# Patient Record
Sex: FEMALE | Race: WHITE | Employment: OTHER | URBAN - METROPOLITAN AREA
[De-identification: names, ages, dates, MRNs, and addresses within clinical notes are randomized per-mention and may not be internally consistent; named-entity substitution may affect disease eponyms.]

---

## 2018-10-20 ENCOUNTER — HOSPITAL ENCOUNTER (OUTPATIENT)
Age: 83
Discharge: HOME | End: 2018-10-20
Payer: MEDICARE

## 2018-10-20 LAB — VANCOMYCIN TROUGH: 17 UG/ML (ref 10–20)

## 2018-10-20 PROCEDURE — 36415 COLL VENOUS BLD VENIPUNCTURE: CPT

## 2018-10-20 PROCEDURE — 80202 ASSAY OF VANCOMYCIN: CPT

## 2018-10-23 ENCOUNTER — HOSPITAL ENCOUNTER (OUTPATIENT)
Age: 83
Setting detail: SPECIMEN
Discharge: HOME | End: 2018-10-23
Payer: MEDICARE

## 2018-10-23 LAB
ALBUMIN SERPL-MCNC: 4 G/DL (ref 3.4–5)
ANION GAP SERPL CALCULATED.3IONS-SCNC: 14 MMOL/L (ref 3–16)
BASOPHILS ABSOLUTE: 0.1 K/UL (ref 0–0.2)
BASOPHILS RELATIVE PERCENT: 1.3 %
BUN BLDV-MCNC: 10 MG/DL (ref 7–20)
CALCIUM SERPL-MCNC: 9.6 MG/DL (ref 8.3–10.6)
CHLORIDE BLD-SCNC: 94 MMOL/L (ref 99–110)
CO2: 27 MMOL/L (ref 21–32)
CREAT SERPL-MCNC: 0.9 MG/DL (ref 0.6–1.2)
EOSINOPHILS ABSOLUTE: 0.4 K/UL (ref 0–0.6)
EOSINOPHILS RELATIVE PERCENT: 7.1 %
GFR AFRICAN AMERICAN: >60
GFR NON-AFRICAN AMERICAN: 60
GLUCOSE BLD-MCNC: 152 MG/DL (ref 70–99)
HCT VFR BLD CALC: 40.3 % (ref 36–48)
HEMOGLOBIN: 13.2 G/DL (ref 12–16)
LYMPHOCYTES ABSOLUTE: 1.6 K/UL (ref 1–5.1)
LYMPHOCYTES RELATIVE PERCENT: 25.2 %
MCH RBC QN AUTO: 30.9 PG (ref 26–34)
MCHC RBC AUTO-ENTMCNC: 32.7 G/DL (ref 31–36)
MCV RBC AUTO: 94.4 FL (ref 80–100)
MONOCYTES ABSOLUTE: 0.4 K/UL (ref 0–1.3)
MONOCYTES RELATIVE PERCENT: 7 %
NEUTROPHILS ABSOLUTE: 3.7 K/UL (ref 1.7–7.7)
NEUTROPHILS RELATIVE PERCENT: 59.4 %
PDW BLD-RTO: 13.4 % (ref 12.4–15.4)
PHOSPHORUS: 3.8 MG/DL (ref 2.5–4.9)
PLATELET # BLD: 269 K/UL (ref 135–450)
PMV BLD AUTO: 9.8 FL (ref 5–10.5)
POTASSIUM SERPL-SCNC: 4.3 MMOL/L (ref 3.5–5.1)
RBC # BLD: 4.27 M/UL (ref 4–5.2)
SEDIMENTATION RATE, ERYTHROCYTE: 33 MM/HR (ref 0–30)
SODIUM BLD-SCNC: 135 MMOL/L (ref 136–145)
VANCOMYCIN TROUGH: 18.4 UG/ML (ref 10–20)
WBC # BLD: 6.2 K/UL (ref 4–11)

## 2018-10-23 PROCEDURE — 85652 RBC SED RATE AUTOMATED: CPT

## 2018-10-23 PROCEDURE — 36415 COLL VENOUS BLD VENIPUNCTURE: CPT

## 2018-10-23 PROCEDURE — 80069 RENAL FUNCTION PANEL: CPT

## 2018-10-23 PROCEDURE — 80202 ASSAY OF VANCOMYCIN: CPT

## 2018-10-23 PROCEDURE — 85025 COMPLETE CBC W/AUTO DIFF WBC: CPT

## 2018-10-23 PROCEDURE — 86140 C-REACTIVE PROTEIN: CPT

## 2018-10-24 LAB — C-REACTIVE PROTEIN: 2.2 MG/L (ref 0–5.1)

## 2018-10-29 ENCOUNTER — HOSPITAL ENCOUNTER (OUTPATIENT)
Age: 83
Setting detail: SPECIMEN
Discharge: HOME | End: 2018-10-29
Payer: MEDICARE

## 2018-10-29 LAB
ALBUMIN SERPL-MCNC: 3.9 G/DL (ref 3.4–5)
ANION GAP SERPL CALCULATED.3IONS-SCNC: 13 MMOL/L (ref 3–16)
BASOPHILS ABSOLUTE: 0.3 K/UL (ref 0–0.2)
BASOPHILS RELATIVE PERCENT: 4.7 %
BUN BLDV-MCNC: 11 MG/DL (ref 7–20)
C-REACTIVE PROTEIN: 1.9 MG/L (ref 0–5.1)
CALCIUM SERPL-MCNC: 9.5 MG/DL (ref 8.3–10.6)
CHLORIDE BLD-SCNC: 99 MMOL/L (ref 99–110)
CO2: 28 MMOL/L (ref 21–32)
CREAT SERPL-MCNC: 0.9 MG/DL (ref 0.6–1.2)
EOSINOPHILS ABSOLUTE: 0.6 K/UL (ref 0–0.6)
EOSINOPHILS RELATIVE PERCENT: 9.4 %
GFR AFRICAN AMERICAN: >60
GFR NON-AFRICAN AMERICAN: 60
GLUCOSE BLD-MCNC: 82 MG/DL (ref 70–99)
HCT VFR BLD CALC: 35.9 % (ref 36–48)
HEMOGLOBIN: 11.6 G/DL (ref 12–16)
LYMPHOCYTES ABSOLUTE: 2.3 K/UL (ref 1–5.1)
LYMPHOCYTES RELATIVE PERCENT: 33.7 %
MCH RBC QN AUTO: 30.4 PG (ref 26–34)
MCHC RBC AUTO-ENTMCNC: 32.4 G/DL (ref 31–36)
MCV RBC AUTO: 93.8 FL (ref 80–100)
MONOCYTES ABSOLUTE: 0.5 K/UL (ref 0–1.3)
MONOCYTES RELATIVE PERCENT: 7.7 %
NEUTROPHILS ABSOLUTE: 3 K/UL (ref 1.7–7.7)
NEUTROPHILS RELATIVE PERCENT: 44.5 %
PDW BLD-RTO: 13.8 % (ref 12.4–15.4)
PHOSPHORUS: 3.6 MG/DL (ref 2.5–4.9)
PLATELET # BLD: 259 K/UL (ref 135–450)
PMV BLD AUTO: 9.5 FL (ref 5–10.5)
POTASSIUM SERPL-SCNC: 4.3 MMOL/L (ref 3.5–5.1)
RBC # BLD: 3.83 M/UL (ref 4–5.2)
SEDIMENTATION RATE, ERYTHROCYTE: 39 MM/HR (ref 0–30)
SODIUM BLD-SCNC: 140 MMOL/L (ref 136–145)
VANCOMYCIN TROUGH: 17.3 UG/ML (ref 10–20)
WBC # BLD: 6.7 K/UL (ref 4–11)

## 2018-10-29 PROCEDURE — 80202 ASSAY OF VANCOMYCIN: CPT

## 2018-10-29 PROCEDURE — 86140 C-REACTIVE PROTEIN: CPT

## 2018-10-29 PROCEDURE — 85025 COMPLETE CBC W/AUTO DIFF WBC: CPT

## 2018-10-29 PROCEDURE — 85652 RBC SED RATE AUTOMATED: CPT

## 2018-10-29 PROCEDURE — 36415 COLL VENOUS BLD VENIPUNCTURE: CPT

## 2018-10-29 PROCEDURE — 80069 RENAL FUNCTION PANEL: CPT

## 2021-04-02 ENCOUNTER — OFFICE VISIT (OUTPATIENT)
Dept: ORTHOPEDIC SURGERY | Age: 86
End: 2021-04-02
Payer: MEDICARE

## 2021-04-02 DIAGNOSIS — M17.11 PRIMARY OSTEOARTHRITIS OF RIGHT KNEE: Primary | ICD-10-CM

## 2021-04-02 PROCEDURE — G8400 PT W/DXA NO RESULTS DOC: HCPCS | Performed by: ORTHOPAEDIC SURGERY

## 2021-04-02 PROCEDURE — G8427 DOCREV CUR MEDS BY ELIG CLIN: HCPCS | Performed by: ORTHOPAEDIC SURGERY

## 2021-04-02 PROCEDURE — 99203 OFFICE O/P NEW LOW 30 MIN: CPT | Performed by: ORTHOPAEDIC SURGERY

## 2021-04-02 PROCEDURE — G8421 BMI NOT CALCULATED: HCPCS | Performed by: ORTHOPAEDIC SURGERY

## 2021-04-02 PROCEDURE — 1090F PRES/ABSN URINE INCON ASSESS: CPT | Performed by: ORTHOPAEDIC SURGERY

## 2021-04-02 PROCEDURE — 4004F PT TOBACCO SCREEN RCVD TLK: CPT | Performed by: ORTHOPAEDIC SURGERY

## 2021-04-02 PROCEDURE — 4040F PNEUMOC VAC/ADMIN/RCVD: CPT | Performed by: ORTHOPAEDIC SURGERY

## 2021-04-02 PROCEDURE — 1123F ACP DISCUSS/DSCN MKR DOCD: CPT | Performed by: ORTHOPAEDIC SURGERY

## 2021-04-02 RX ORDER — DULOXETIN HYDROCHLORIDE 60 MG/1
60 CAPSULE, DELAYED RELEASE ORAL DAILY
COMMUNITY
Start: 2021-02-17

## 2021-04-02 RX ORDER — MELOXICAM 15 MG/1
15 TABLET ORAL DAILY
COMMUNITY
Start: 2021-01-22

## 2021-04-02 RX ORDER — METHIMAZOLE 5 MG/1
5 TABLET ORAL DAILY
COMMUNITY
Start: 2021-01-22

## 2021-04-02 NOTE — PROGRESS NOTES
KNEE VISIT      HISTORY OF PRESENT ILLNESS    Isiah Kocher is a 81 y.o. female who presents for evaluation of right knee pain she has had for the last several years. Over the years she has had medication therapy bracing and injections. None of these of lasted for more than just a few weeks. Because of this failure and her inability to get around and her desire to get better, she is here to discuss treatment options. ROS    Well-documented patient history form dated 4/2/2021  All other ROS negative except for above. Past Surgical history    Past Surgical History:   Procedure Laterality Date    BREAST SURGERY      CATARACT REMOVAL      CHOLECYSTECTOMY      DIAGNOSTIC CARDIAC CATH LAB PROCEDURE      HYSTERECTOMY      KNEE SURGERY      ROTATOR CUFF REPAIR      WRIST SURGERY         PAST MEDICAL    Past Medical History:   Diagnosis Date    Arthritis     Asthma     Hyperlipidemia     Hypertension     Rash     non-cancerous moles removed       Allergies    No Known Allergies    Meds    Current Outpatient Medications   Medication Sig Dispense Refill    vitamin D (CHOLECALCIFEROL) 25 MCG (1000 UT) TABS tablet Take 2,000 Units by mouth daily      DULoxetine (CYMBALTA) 60 MG extended release capsule       meloxicam (MOBIC) 15 MG tablet       methIMAzole (TAPAZOLE) 5 MG tablet       fluticasone-salmeterol (ADVAIR) 100-50 MCG/DOSE diskus inhaler Inhale 1 puff into the lungs every 12 hours.  gabapentin (NEURONTIN) 300 MG capsule Take 100 mg by mouth 3 times daily.  omeprazole (PRILOSEC) 20 MG capsule Take 20 mg by mouth daily.  metoprolol (TOPROL-XL) 50 MG XL tablet Take 50 mg by mouth daily.  hydrochlorothiazide (HYDRODIURIL) 25 MG tablet Take 25 mg by mouth daily.  pravastatin (PRAVACHOL) 20 MG tablet Take 20 mg by mouth daily.  aspirin 81 MG tablet Take 81 mg by mouth daily.       oxyCODONE-acetaminophen (PERCOCET) 5-325 MG per tablet Take 1 tablet by mouth 2 times daily as needed for Pain (Patient not taking: Reported on 4/2/2021) 60 tablet 0     No current facility-administered medications for this visit. Social    Social History     Socioeconomic History    Marital status:      Spouse name: Not on file    Number of children: Not on file    Years of education: Not on file    Highest education level: Not on file   Occupational History    Not on file   Social Needs    Financial resource strain: Not on file    Food insecurity     Worry: Not on file     Inability: Not on file    Transportation needs     Medical: Not on file     Non-medical: Not on file   Tobacco Use    Smoking status: Never Smoker   Substance and Sexual Activity    Alcohol use: Not on file    Drug use: Not on file    Sexual activity: Not on file   Lifestyle    Physical activity     Days per week: Not on file     Minutes per session: Not on file    Stress: Not on file   Relationships    Social connections     Talks on phone: Not on file     Gets together: Not on file     Attends [employer] service: Not on file     Active member of club or organization: Not on file     Attends meetings of clubs or organizations: Not on file     Relationship status: Not on file    Intimate partner violence     Fear of current or ex partner: Not on file     Emotionally abused: Not on file     Physically abused: Not on file     Forced sexual activity: Not on file   Other Topics Concern    Not on file   Social History Narrative    Not on file       Family HISTORY    Family History   Problem Relation Age of Onset    Diabetes Mother     Heart Failure Father     Cancer Sister         breast    Diabetes Brother     Emphysema Brother     Asthma Neg Hx     Hypertension Neg Hx     Cancer Sister         lung    Heart Failure Brother        PHYSICAL EXAM    Vital Signs: There were no vitals taken for this visit. General Appearance:  Normal body habitus.  Alert and oriented to person, place, and time.   Affect:  Normal.   Gait: Antalgic requiring a cane. Good balance and coordination. Skin:  Intact. Sensation:  Intact. Strength:  Intact. Reflexes:  Intact. Pulses:  Intact. Knee Exam:    Effusion: Negative    Range of Motion Right Left   Extension -8 0   Flexion 100 105     Provocative Test Right Left    Positive Negative Positive Negative   Anterior drawer [] [x] [] [x]   Lachman [] [x] [] [x]   Posterior drawer [] [x] [] [x]   Varus testing [] [x] [] [x]   Valgus testing [x] [] [x] []   Joint line tenderness [x] [] [x] []     Additional Exam Comments: Her neurocirculatory lymphatic exam is normal symmetric both lower extremities. She has none range of motion some crepitus stiffness and generalized pain mostly lateral compartment of her right knee. She has no gross instability. IMAGING STUDIES    X-rays 2 views right knee reveals severe end-stage osteoarthritis which is tricompartmental and affects the lateral compartment to the greatest degree    IMPRESSION    End-stage severe right knee osteoarthritis    PLAN      1. Conservative care options including physical therapy, NSAIDs, bracing, and activity modification were discussed. 2.  The indications for therapeutic injections were discussed. 3.  The indications for additional imaging studies were discussed. 4.  After considering the various options discussed, the patient elected to pursue a course that includes proceed with right total knee replacement since she is failed to improve over the last few years with other conservative modalities including medicines and injections. She has x-rays which demonstrate severe end-stage osteoarthritis    The patient was counseled at length about the risks of cecelia Covid-19 during their perioperative period and any recovery window from their procedure.   The patient was made aware that cecelia Covid-19  may worsen their prognosis for recovering from their procedure  and lend to a higher morbidity and/or mortality risk. All material risks, benefits, and reasonable alternatives including postponing the procedure were discussed. The patient does wish to proceed with the procedure at this time. INFORMED CONSENT NOTE        We discussed the risks, benefits, and alternatives to the proposed procedure, as well as the necessity of other members of the healthcare team participating in the procedure. All questions were answered and the patient elected to proceed with the proposed procedure and signed the informed consent form.

## 2021-04-02 NOTE — LETTER
[address] [place] [address]  Phone: [phone]  Fax: [phone]    Ashlyn Sherman MD        2021    [address] [address]   1935  DOS 2021      KNEE VISIT      HISTORY OF PRESENT ILLNESS    Asia Roberts is a 81 y.o. female who presents for evaluation of right knee pain she has had for the last several years. Over the years she has had medication therapy bracing and injections. None of these of lasted for more than just a few weeks. Because of this failure and her inability to get around and her desire to get better, she is here to discuss treatment options. ROS    Well-documented patient history form dated 2021  All other ROS negative except for above. Past Surgical history    Past Surgical History:   Procedure Laterality Date    BREAST SURGERY      CATARACT REMOVAL      CHOLECYSTECTOMY      DIAGNOSTIC CARDIAC CATH LAB PROCEDURE      HYSTERECTOMY      KNEE SURGERY      ROTATOR CUFF REPAIR      WRIST SURGERY         PAST MEDICAL    Past Medical History:   Diagnosis Date    Arthritis     Asthma     Hyperlipidemia     Hypertension     Rash     non-cancerous moles removed       Allergies    No Known Allergies    Meds    Current Outpatient Medications   Medication Sig Dispense Refill    vitamin D (CHOLECALCIFEROL) 25 MCG (1000 UT) TABS tablet Take 2,000 Units by mouth daily      DULoxetine (CYMBALTA) 60 MG extended release capsule       meloxicam (MOBIC) 15 MG tablet       methIMAzole (TAPAZOLE) 5 MG tablet       fluticasone-salmeterol (ADVAIR) 100-50 MCG/DOSE diskus inhaler Inhale 1 puff into the lungs every 12 hours.  gabapentin (NEURONTIN) 300 MG capsule Take 100 mg by mouth 3 times daily.  omeprazole (PRILOSEC) 20 MG capsule Take 20 mg by mouth daily.  metoprolol (TOPROL-XL) 50 MG XL tablet Take 50 mg by mouth daily.       hydrochlorothiazide (HYDRODIURIL) 25 MG tablet Take 25 mg by mouth daily.  pravastatin (PRAVACHOL) 20 MG tablet Take 20 mg by mouth daily.  aspirin 81 MG tablet Take 81 mg by mouth daily.  oxyCODONE-acetaminophen (PERCOCET) 5-325 MG per tablet Take 1 tablet by mouth 2 times daily as needed for Pain (Patient not taking: Reported on 4/2/2021) 60 tablet 0     No current facility-administered medications for this visit.         Social    Social History     Socioeconomic History    Marital status:      Spouse name: Not on file    Number of children: Not on file    Years of education: Not on file    Highest education level: Not on file   Occupational History    Not on file   Social Needs    Financial resource strain: Not on file    Food insecurity     Worry: Not on file     Inability: Not on file    Transportation needs     Medical: Not on file     Non-medical: Not on file   Tobacco Use    Smoking status: Never Smoker   Substance and Sexual Activity    Alcohol use: Not on file    Drug use: Not on file    Sexual activity: Not on file   Lifestyle    Physical activity     Days per week: Not on file     Minutes per session: Not on file    Stress: Not on file   Relationships    Social connections     Talks on phone: Not on file     Gets together: Not on file     Attends Latter-day service: Not on file     Active member of club or organization: Not on file     Attends meetings of clubs or organizations: Not on file     Relationship status: Not on file    Intimate partner violence     Fear of current or ex partner: Not on file     Emotionally abused: Not on file     Physically abused: Not on file     Forced sexual activity: Not on file   Other Topics Concern    Not on file   Social History Narrative    Not on file       Family HISTORY    Family History   Problem Relation Age of Onset    Diabetes Mother     Heart Failure Father     Cancer Sister         breast    Diabetes Brother    [facility] Emphysema Brother     Asthma Neg Hx     Hypertension Neg Hx     Cancer Sister         lung    Heart Failure Brother        PHYSICAL EXAM    Vital Signs: There were no vitals taken for this visit. General Appearance:  Normal body habitus. Alert and oriented to person, place, and time. Affect:  Normal.   Gait: Antalgic requiring a cane. Good balance and coordination. Skin:  Intact. Sensation:  Intact. Strength:  Intact. Reflexes:  Intact. Pulses:  Intact. Knee Exam:    Effusion: Negative    Range of Motion Right Left   Extension -8 0   Flexion 100 105     Provocative Test Right Left    Positive Negative Positive Negative   Anterior drawer [] [x] [] [x]   Lachman [] [x] [] [x]   Posterior drawer [] [x] [] [x]   Varus testing [] [x] [] [x]   Valgus testing [x] [] [x] []   Joint line tenderness [x] [] [x] []     Additional Exam Comments: Her neurocirculatory lymphatic exam is normal symmetric both lower extremities. She has none range of motion some crepitus stiffness and generalized pain mostly lateral compartment of her right knee. She has no gross instability. IMAGING STUDIES    X-rays 2 views right knee reveals severe end-stage osteoarthritis which is tricompartmental and affects the lateral compartment to the greatest degree    IMPRESSION    End-stage severe right knee osteoarthritis    PLAN      1. Conservative care options including physical therapy, NSAIDs, bracing, and activity modification were discussed. 2.  The indications for therapeutic injections were discussed. 3.  The indications for additional imaging studies were discussed. 4.  After considering the various options discussed, the patient elected to pursue a course that includes proceed with right total knee replacement since she is failed to improve over the last few years with other conservative modalities including medicines and injections.   She has x-rays which demonstrate severe end-stage osteoarthritis    The

## 2021-04-13 ENCOUNTER — TELEPHONE (OUTPATIENT)
Dept: ORTHOPEDIC SURGERY | Age: 86
End: 2021-04-13

## 2021-04-13 NOTE — TELEPHONE ENCOUNTER
Auth: NPR  Date: 5/17/2021  Reference # None  Spoke with: None  [place]: [place]  [place] [facility] [place] [address]   SX area: Rt knee  Insurance: Medicare A&B

## 2021-04-26 ENCOUNTER — HOSPITAL ENCOUNTER (OUTPATIENT)
Age: 86
Discharge: HOME | End: 2021-04-26
Payer: MEDICARE

## 2021-04-26 ENCOUNTER — HOSPITAL ENCOUNTER (OUTPATIENT)
Dept: GENERAL RADIOLOGY | Age: 86
Discharge: HOME | End: 2021-04-26
Payer: MEDICARE

## 2021-04-26 DIAGNOSIS — M17.0 OSTEOARTHRITIS OF BOTH KNEES, UNSPECIFIED OSTEOARTHRITIS TYPE: ICD-10-CM

## 2021-04-26 LAB
ALBUMIN SERPL-MCNC: 4.3 G/DL (ref 3.4–5)
ANION GAP SERPL CALCULATED.3IONS-SCNC: 14 MMOL/L (ref 3–16)
APTT: 32.6 SEC (ref 24.2–36.2)
BASOPHILS ABSOLUTE: 0.1 K/UL (ref 0–0.2)
BASOPHILS RELATIVE PERCENT: 0.8 %
BILIRUBIN URINE: NEGATIVE
BLOOD, URINE: NEGATIVE
BUN BLDV-MCNC: 26 MG/DL (ref 7–20)
CALCIUM SERPL-MCNC: 9.2 MG/DL (ref 8.3–10.6)
CHLORIDE BLD-SCNC: 102 MMOL/L (ref 99–110)
CLARITY: CLEAR
CO2: 26 MMOL/L (ref 21–32)
COLOR: YELLOW
CREAT SERPL-MCNC: 1.2 MG/DL (ref 0.6–1.2)
EKG ATRIAL RATE: 60 BPM
EKG DIAGNOSIS: NORMAL
EKG P AXIS: 56 DEGREES
EKG P-R INTERVAL: 158 MS
EKG Q-T INTERVAL: 412 MS
EKG QRS DURATION: 86 MS
EKG QTC CALCULATION (BAZETT): 412 MS
EKG R AXIS: 32 DEGREES
EKG T AXIS: 51 DEGREES
EKG VENTRICULAR RATE: 60 BPM
EOSINOPHILS ABSOLUTE: 0.4 K/UL (ref 0–0.6)
EOSINOPHILS RELATIVE PERCENT: 4.5 %
GFR AFRICAN AMERICAN: 52
GFR NON-AFRICAN AMERICAN: 43
GLUCOSE BLD-MCNC: 99 MG/DL (ref 70–99)
GLUCOSE URINE: NEGATIVE MG/DL
HCT VFR BLD CALC: 40.1 % (ref 36–48)
HEMOGLOBIN: 13.3 G/DL (ref 12–16)
INR BLD: 0.97 (ref 0.86–1.14)
KETONES, URINE: ABNORMAL MG/DL
LEUKOCYTE ESTERASE, URINE: NEGATIVE
LYMPHOCYTES ABSOLUTE: 2.4 K/UL (ref 1–5.1)
LYMPHOCYTES RELATIVE PERCENT: 26.4 %
MCH RBC QN AUTO: 30 PG (ref 26–34)
MCHC RBC AUTO-ENTMCNC: 33.2 G/DL (ref 31–36)
MCV RBC AUTO: 90.4 FL (ref 80–100)
MICROSCOPIC EXAMINATION: ABNORMAL
MONOCYTES ABSOLUTE: 0.6 K/UL (ref 0–1.3)
MONOCYTES RELATIVE PERCENT: 6.8 %
NEUTROPHILS ABSOLUTE: 5.6 K/UL (ref 1.7–7.7)
NEUTROPHILS RELATIVE PERCENT: 61.5 %
NITRITE, URINE: NEGATIVE
PDW BLD-RTO: 13.8 % (ref 12.4–15.4)
PH UA: 6.5 (ref 5–8)
PLATELET # BLD: 238 K/UL (ref 135–450)
PMV BLD AUTO: 10 FL (ref 5–10.5)
POTASSIUM SERPL-SCNC: 4.8 MMOL/L (ref 3.5–5.1)
PROTEIN UA: NEGATIVE MG/DL
PROTHROMBIN TIME: 11.3 SEC (ref 10–13.2)
RBC # BLD: 4.43 M/UL (ref 4–5.2)
SODIUM BLD-SCNC: 142 MMOL/L (ref 136–145)
SPECIFIC GRAVITY UA: 1.02 (ref 1–1.03)
TRANSFERRIN: 281 MG/DL (ref 200–360)
URINE TYPE: ABNORMAL
UROBILINOGEN, URINE: 1 E.U./DL
WBC # BLD: 9.1 K/UL (ref 4–11)

## 2021-04-26 PROCEDURE — 80048 BASIC METABOLIC PNL TOTAL CA: CPT

## 2021-04-26 PROCEDURE — 85730 THROMBOPLASTIN TIME PARTIAL: CPT

## 2021-04-26 PROCEDURE — 85610 PROTHROMBIN TIME: CPT

## 2021-04-26 PROCEDURE — 93005 ELECTROCARDIOGRAM TRACING: CPT | Performed by: ORTHOPAEDIC SURGERY

## 2021-04-26 PROCEDURE — 84466 ASSAY OF TRANSFERRIN: CPT

## 2021-04-26 PROCEDURE — 36415 COLL VENOUS BLD VENIPUNCTURE: CPT

## 2021-04-26 PROCEDURE — 87086 URINE CULTURE/COLONY COUNT: CPT

## 2021-04-26 PROCEDURE — 93010 ELECTROCARDIOGRAM REPORT: CPT | Performed by: INTERNAL MEDICINE

## 2021-04-26 PROCEDURE — 82040 ASSAY OF SERUM ALBUMIN: CPT

## 2021-04-26 PROCEDURE — 81003 URINALYSIS AUTO W/O SCOPE: CPT

## 2021-04-26 PROCEDURE — 85025 COMPLETE CBC W/AUTO DIFF WBC: CPT

## 2021-04-27 LAB — URINE CULTURE, ROUTINE: NORMAL

## 2021-04-30 DIAGNOSIS — M17.11 PRIMARY OSTEOARTHRITIS OF RIGHT KNEE: Primary | ICD-10-CM

## 2021-04-30 RX ORDER — VITAMIN E 268 MG
400 CAPSULE ORAL DAILY
COMMUNITY

## 2021-04-30 SDOH — HEALTH STABILITY: MENTAL HEALTH: HOW OFTEN DO YOU HAVE A DRINK CONTAINING ALCOHOL?: NEVER

## 2021-04-30 NOTE — PROGRESS NOTES
PRE OP INSTRUCTION SHEET   1. Do not eat or drink anything after 12 midnight  prior to surgery. This includes no water, chewing gum or mints. 2. Take the following pills will a small sip of water (see MAR)                                        3. Aspirin, Ibuprofen, Advil, Naproxen, Vitamin E, fish oil and other Anti-inflammatory products should be stopped for 5 days before surgery or as directed by your physician. 4. Check with your Doctor regarding stopping Plavix, Coumadin, Lovenox, Fragmin or other blood thinners   5. Do not smoke, and do not drink any alcoholic beverages 24 hours prior to surgery. This includes NA Beer. 6. You may brush your teeth and gargle the morning of surgery. DO NOT SWALLOW WATER   7. You MUST make arrangements for a responsible adult to take you home after your surgery. You will not be allowed to leave alone or drive yourself home. It is strongly suggested someone stay with you the first 24 hrs. Your surgery will be cancelled if you do not have a ride home. 8. A parent/legal guardian must accompany a child scheduled for surgery and plan to stay at the hospital until the child is discharged. Please do not bring other children with you. 9. Please wear simple, loose fitting clothing to the hospital.  Roberto Edu not bring valuables (money, credit cards, checkbooks, etc.) Do not wear any makeup (including no eye makeup) or nail polish on your fingers or toes. 10. DO NOT wear any jewelry or piercings on day of surgery. All body piercing jewelry must be removed. 11. If you have dentures,glasses, or contacts they will be removed before going to the OR; we will provide you a container. 12. Please see your family doctor/and cardiologist for a history & physical and/or concerning medications. Bring any test results/reports from your physician's office. Have history and labs faxed to [phone].  Remember to bring Blood Bank bracelet on the day of surgery. 14. If you have a Living Will and Durable Power of  for Healthcare, please bring in a copy. 13. Notify your Surgeon if you develop any illness between now and surgery  time, cough, cold, fever, sore throat, nausea, vomiting, etc.  Please notify your surgeon if you experience dizziness, shortness of breath or blurred vision between now & the time of your surgery   16. DO NOT shave your operative site 96 hours prior to surgery. For face & neck surgery, men may use an electric razor 48 hours prior to surgery. 17. Shower with _x__Antibacterial soap (x_chlorhexidine for total joint  Pt's) shower two times before surgery.(the morning of and the night before. 18. To provide excellent care visitors will be limited to one in the room at any given time.   Please call pre admission testing if you any further questions [phone] or [phone]

## 2021-05-07 ENCOUNTER — TELEPHONE (OUTPATIENT)
Dept: ORTHOPEDIC SURGERY | Age: 86
End: 2021-05-07

## 2021-05-07 NOTE — TELEPHONE ENCOUNTER
COVID:5/11/21-Negative  H and P- Not listed in epic  Cardio clearance- not listed in Epic. Orthopedic Nurse Navigator Summary  -  Patient Name: Willie Ferreira  Anticipated Date of Surgery:5/17/21  Using [facility]? Yes, Are they Registered: Yes  Attended Pre-Op Education Class: No  If No, why not? PCP:  Phone #:  Date of PCP Visit for H&P: 04/26/2021  Any Noted Concerns from PCP prior to surgery: No  If Yes, what concerns?:  Is the Patient in a Pain Management Program?: No  Review of Past Medical History Reveals History of:  -  Critical Lab Values:  - Hemoglobin (g/dL): Date Value 13.3  - Hematocrit (%): Date Value  - HgbA1C : Date Value 0.0  - Albumin : Date Value 4.3  - BUN (mg/dL) : Date Value 26.0  - CREATININE (mg/dL) : Date Value 1.2  - BMI (kg/m2) : Date Value  -  Coronary Artery Disease/HTN/CHF History: Yes  Cardiologist: HTN managed by PCP and meds  Cardiac Clearance Necessary: Yes  Date of Cardiac Clearance Appt: 05/03/2021  On Plavix? No  If YES, when will they stop taking? Final Cardiac Recommendations: On any anticoagulation: No  -  Diabetes History: No  Most Recent HgbA1C: 0.0  PCP or Endocrine Recommendations:  Nutritionist/Dietician Consult Scheduled:  Final Plan For Diabetic Control:  Pulmonary: COPD/Emphysema/ Use of home oxygen: [place]- Does not use C pap  Alcohol use: Non-Drinker  -  DVT Risk Stratification: Low Risk  Vascular Consult Ordered:  Date of Vascular Appt:  Hematology/Oncology Consult Ordered:  Date of Hematology/Oncology Appt:  Final Recommendation For DVT Prophylaxis:  Smoking history: Non-Smoker  Use of Estrogen:  -  BMI Greater than 40 at time of scheduling?: No  Has Surgeon been notified of BMI concern? No  Weight Loss Clinic Consult Ordered No  Date of Wt Loss Clinic Appt:  BMI at time of surgery (if went through [facility]):  -  Additional Medical Concerns:   Additional Recommendations for above concerns:  Attended Pre-Hab Program: No  Anticipated Discharge Disposition: D/C home  Who will be with patient at home following discharge?    Equipment patient already has: walker and cane  Bedroom on first or second floor: First  Bathroom on first or second floor: first  Weight bearing status: full  Pre-op ambulatory status:  Number of entry steps: 2 steps   Caregiver assistance: full time  -No HHC preference   Anna Rachael  05/10/2021

## 2021-05-11 ENCOUNTER — HOSPITAL ENCOUNTER (OUTPATIENT)
Age: 86
Discharge: HOME | End: 2021-05-11
Payer: MEDICARE

## 2021-05-11 LAB — SARS-COV-2: NOT DETECTED

## 2021-05-11 PROCEDURE — U0005 INFEC AGEN DETEC AMPLI PROBE: HCPCS

## 2021-05-11 PROCEDURE — U0003 INFECTIOUS AGENT DETECTION BY NUCLEIC ACID (DNA OR RNA); SEVERE ACUTE RESPIRATORY SYNDROME CORONAVIRUS 2 (SARS-COV-2) (CORONAVIRUS DISEASE [COVID-19]), AMPLIFIED PROBE TECHNIQUE, MAKING USE OF HIGH THROUGHPUT TECHNOLOGIES AS DESCRIBED BY CMS-2020-01-R: HCPCS

## 2021-05-15 ENCOUNTER — ANESTHESIA EVENT (OUTPATIENT)
Dept: OPERATING ROOM | Age: 86
DRG: 470 | End: 2021-05-15
Payer: MEDICARE

## 2021-05-16 NOTE — ANESTHESIA PRE PROCEDURE
 BREAST SURGERY      CATARACT REMOVAL      CHOLECYSTECTOMY      DIAGNOSTIC CARDIAC CATH LAB PROCEDURE      DILATION AND CURETTAGE OF UTERUS      EYE SURGERY      HYSTERECTOMY      KNEE SURGERY      ROTATOR CUFF REPAIR      WRIST SURGERY         Social History:    Social History     Tobacco Use    Smoking status: Never Smoker    Smokeless tobacco: Never Used   Substance Use Topics    Alcohol use: Never                                Counseling given: Not Answered      Vital Signs (Current):   Vitals:    04/30/21 1042   Weight: 173 lb (78.5 kg)   Height: 5' 2\" (1.575 m)                                              BP Readings from Last 3 Encounters:   05/07/15 118/74   04/07/15 138/84   11/22/13 138/88       NPO Status:                                                                                 BMI:   Wt Readings from Last 3 Encounters:   05/07/15 197 lb (89.4 kg)   05/05/15 197 lb (89.4 kg)   04/07/15 204 lb (92.5 kg)     Body mass index is 31.64 kg/m². CBC:   Lab Results   Component Value Date    WBC 9.1 04/26/2021    RBC 4.43 04/26/2021    HGB 13.3 04/26/2021    HCT 40.1 04/26/2021    MCV 90.4 04/26/2021    RDW 13.8 04/26/2021     04/26/2021       CMP:   Lab Results   Component Value Date     04/26/2021    K 4.8 04/26/2021     04/26/2021    CO2 26 04/26/2021    BUN 26 04/26/2021    CREATININE 1.2 04/26/2021    GFRAA 52 04/26/2021    LABGLOM 43 04/26/2021    GLUCOSE 99 04/26/2021    CALCIUM 9.2 04/26/2021       POC Tests: No results for input(s): POCGLU, POCNA, POCK, POCCL, POCBUN, POCHEMO, POCHCT in the last 72 hours.     Coags:   Lab Results   Component Value Date    PROTIME 11.3 04/26/2021    INR 0.97 04/26/2021    APTT 32.6 04/26/2021       HCG (If Applicable): No results found for: PREGTESTUR, PREGSERUM, HCG, HCGQUANT     ABGs: No results found for: PHART, PO2ART, NPJ8FHH, ILT3HGF, BEART, I3YGLOKD     Type & Screen (If Applicable):  No results found for: [place] Drug/Infectious Status (If Applicable):  No results found for: HIV, HEPCAB    COVID-19 Screening (If Applicable):   Lab Results   Component Value Date    COVID19 Not Detected 05/11/2021           Anesthesia Evaluation  Patient summary reviewed and Nursing notes reviewed no history of anesthetic complications:   Airway: Mallampati: II  TM distance: >3 FB   Neck ROM: full  Mouth opening: > = 3 FB Dental:    (+) upper dentures      Pulmonary:   (+) sleep apnea:  asthma: allergic asthma,                            Cardiovascular:    (+) hypertension:,                   Neuro/Psych:   Negative Neuro/Psych ROS              GI/Hepatic/Renal: Neg GI/Hepatic/Renal ROS  (+) GERD: well controlled,      (-) liver disease and no renal disease       Endo/Other: Negative Endo/Other ROS       (-) diabetes mellitus               Abdominal:           Vascular: negative vascular ROS. Anesthesia Plan      spinal and regional     ASA 3     (Adductor canal block for post op pain.)  Induction: intravenous. Anesthetic plan and risks discussed with patient. Plan discussed with CRNA. All questions answered and agrees with plan.         Villa Kirk MD   5/15/2021

## 2021-05-17 ENCOUNTER — HOSPITAL ENCOUNTER (INPATIENT)
Age: 86
LOS: 3 days | Discharge: HOME | DRG: 470 | End: 2021-05-20
Attending: ORTHOPAEDIC SURGERY | Admitting: ORTHOPAEDIC SURGERY
Payer: MEDICARE

## 2021-05-17 ENCOUNTER — APPOINTMENT (OUTPATIENT)
Dept: GENERAL RADIOLOGY | Age: 86
DRG: 470 | End: 2021-05-17
Attending: ORTHOPAEDIC SURGERY
Payer: MEDICARE

## 2021-05-17 ENCOUNTER — ANESTHESIA (OUTPATIENT)
Dept: OPERATING ROOM | Age: 86
DRG: 470 | End: 2021-05-17
Payer: MEDICARE

## 2021-05-17 VITALS — OXYGEN SATURATION: 93 % | TEMPERATURE: 97.9 F | SYSTOLIC BLOOD PRESSURE: 156 MMHG | DIASTOLIC BLOOD PRESSURE: 69 MMHG

## 2021-05-17 PROBLEM — M17.11 OSTEOARTHRITIS OF RIGHT KNEE: Status: ACTIVE | Noted: 2021-05-17

## 2021-05-17 LAB
ABO/RH: NORMAL
ANTIBODY SCREEN: NORMAL

## 2021-05-17 PROCEDURE — 97530 THERAPEUTIC ACTIVITIES: CPT

## 2021-05-17 PROCEDURE — 6360000002 HC RX W HCPCS: Performed by: ORTHOPAEDIC SURGERY

## 2021-05-17 PROCEDURE — 2580000003 HC RX 258: Performed by: NURSE ANESTHETIST, CERTIFIED REGISTERED

## 2021-05-17 PROCEDURE — 3E0T3BZ INTRODUCTION OF ANESTHETIC AGENT INTO PERIPHERAL NERVES AND PLEXI, PERCUTANEOUS APPROACH: ICD-10-PCS | Performed by: ANESTHESIOLOGY

## 2021-05-17 PROCEDURE — 2580000003 HC RX 258: Performed by: ORTHOPAEDIC SURGERY

## 2021-05-17 PROCEDURE — 73560 X-RAY EXAM OF KNEE 1 OR 2: CPT

## 2021-05-17 PROCEDURE — 2500000003 HC RX 250 WO HCPCS: Performed by: NURSE ANESTHETIST, CERTIFIED REGISTERED

## 2021-05-17 PROCEDURE — 2500000003 HC RX 250 WO HCPCS: Performed by: ANESTHESIOLOGY

## 2021-05-17 PROCEDURE — 7100000000 HC PACU RECOVERY - FIRST 15 MIN: Performed by: ORTHOPAEDIC SURGERY

## 2021-05-17 PROCEDURE — L1830 KO IMMOB CANVAS LONG PRE OTS: HCPCS | Performed by: ORTHOPAEDIC SURGERY

## 2021-05-17 PROCEDURE — 6370000000 HC RX 637 (ALT 250 FOR IP): Performed by: ORTHOPAEDIC SURGERY

## 2021-05-17 PROCEDURE — 2500000003 HC RX 250 WO HCPCS: Performed by: ORTHOPAEDIC SURGERY

## 2021-05-17 PROCEDURE — 2580000003 HC RX 258: Performed by: ANESTHESIOLOGY

## 2021-05-17 PROCEDURE — 7100000001 HC PACU RECOVERY - ADDTL 15 MIN: Performed by: ORTHOPAEDIC SURGERY

## 2021-05-17 PROCEDURE — 3600000005 HC SURGERY LEVEL 5 BASE: Performed by: ORTHOPAEDIC SURGERY

## 2021-05-17 PROCEDURE — 97161 PT EVAL LOW COMPLEX 20 MIN: CPT

## 2021-05-17 PROCEDURE — 97535 SELF CARE MNGMENT TRAINING: CPT

## 2021-05-17 PROCEDURE — 86900 BLOOD TYPING SEROLOGIC ABO: CPT

## 2021-05-17 PROCEDURE — 1200000000 HC SEMI PRIVATE

## 2021-05-17 PROCEDURE — 64447 NJX AA&/STRD FEMORAL NRV IMG: CPT | Performed by: ANESTHESIOLOGY

## 2021-05-17 PROCEDURE — 86901 BLOOD TYPING SEROLOGIC RH(D): CPT

## 2021-05-17 PROCEDURE — 3700000001 HC ADD 15 MINUTES (ANESTHESIA): Performed by: ORTHOPAEDIC SURGERY

## 2021-05-17 PROCEDURE — 36415 COLL VENOUS BLD VENIPUNCTURE: CPT

## 2021-05-17 PROCEDURE — 3600000015 HC SURGERY LEVEL 5 ADDTL 15MIN: Performed by: ORTHOPAEDIC SURGERY

## 2021-05-17 PROCEDURE — 97110 THERAPEUTIC EXERCISES: CPT

## 2021-05-17 PROCEDURE — 97166 OT EVAL MOD COMPLEX 45 MIN: CPT

## 2021-05-17 PROCEDURE — 0SRC0J9 REPLACEMENT OF RIGHT KNEE JOINT WITH SYNTHETIC SUBSTITUTE, CEMENTED, OPEN APPROACH: ICD-10-PCS | Performed by: ORTHOPAEDIC SURGERY

## 2021-05-17 PROCEDURE — 86850 RBC ANTIBODY SCREEN: CPT

## 2021-05-17 PROCEDURE — C1776 JOINT DEVICE (IMPLANTABLE): HCPCS | Performed by: ORTHOPAEDIC SURGERY

## 2021-05-17 PROCEDURE — C1713 ANCHOR/SCREW BN/BN,TIS/BN: HCPCS | Performed by: ORTHOPAEDIC SURGERY

## 2021-05-17 PROCEDURE — 3700000000 HC ANESTHESIA ATTENDED CARE: Performed by: ORTHOPAEDIC SURGERY

## 2021-05-17 PROCEDURE — 2709999900 HC NON-CHARGEABLE SUPPLY: Performed by: ORTHOPAEDIC SURGERY

## 2021-05-17 PROCEDURE — C9290 INJ, BUPIVACAINE LIPOSOME: HCPCS | Performed by: ORTHOPAEDIC SURGERY

## 2021-05-17 PROCEDURE — 6360000002 HC RX W HCPCS: Performed by: NURSE ANESTHETIST, CERTIFIED REGISTERED

## 2021-05-17 DEVICE — CEMENT BNE 40GM W/ GENT HI VISC RADPQ FOR REV SURG: Type: IMPLANTABLE DEVICE | Site: KNEE | Status: FUNCTIONAL

## 2021-05-17 DEVICE — 32MM PATELLA, VITAMIN E
Type: IMPLANTABLE DEVICE | Site: KNEE | Status: FUNCTIONAL
Brand: BKS E-VITALIZE

## 2021-05-17 DEVICE — SIZE 4 7MM TIBIAL INSERT CR
Type: IMPLANTABLE DEVICE | Site: KNEE | Status: FUNCTIONAL
Brand: BKS E-VITALIZE

## 2021-05-17 DEVICE — SIZE 4 TIBIAL TRAY NONPOROUS
Type: IMPLANTABLE DEVICE | Site: KNEE | Status: FUNCTIONAL
Brand: BALANCED KNEE SYSTEM

## 2021-05-17 DEVICE — RT SIZE 5 FEMORAL CR NONPOROUS
Type: IMPLANTABLE DEVICE | Site: KNEE | Status: FUNCTIONAL
Brand: BKS TRIMAX

## 2021-05-17 RX ORDER — OXYCODONE HYDROCHLORIDE 5 MG/1
10 TABLET ORAL EVERY 4 HOURS PRN
Status: DISCONTINUED | OUTPATIENT
Start: 2021-05-17 | End: 2021-05-20 | Stop reason: HOSPADM

## 2021-05-17 RX ORDER — ACETAMINOPHEN 325 MG/1
650 TABLET ORAL EVERY 6 HOURS
Status: DISCONTINUED | OUTPATIENT
Start: 2021-05-17 | End: 2021-05-20 | Stop reason: HOSPADM

## 2021-05-17 RX ORDER — OXYCODONE HYDROCHLORIDE 5 MG/1
10 TABLET ORAL PRN
Status: DISCONTINUED | OUTPATIENT
Start: 2021-05-17 | End: 2021-05-17 | Stop reason: HOSPADM

## 2021-05-17 RX ORDER — ONDANSETRON 2 MG/ML
INJECTION INTRAMUSCULAR; INTRAVENOUS PRN
Status: DISCONTINUED | OUTPATIENT
Start: 2021-05-17 | End: 2021-05-17 | Stop reason: SDUPTHER

## 2021-05-17 RX ORDER — FENTANYL CITRATE 50 UG/ML
25 INJECTION, SOLUTION INTRAMUSCULAR; INTRAVENOUS EVERY 5 MIN PRN
Status: DISCONTINUED | OUTPATIENT
Start: 2021-05-17 | End: 2021-05-17 | Stop reason: HOSPADM

## 2021-05-17 RX ORDER — ONDANSETRON 2 MG/ML
4 INJECTION INTRAMUSCULAR; INTRAVENOUS EVERY 6 HOURS PRN
Status: DISCONTINUED | OUTPATIENT
Start: 2021-05-17 | End: 2021-05-20 | Stop reason: HOSPADM

## 2021-05-17 RX ORDER — FENTANYL CITRATE 50 UG/ML
INJECTION, SOLUTION INTRAMUSCULAR; INTRAVENOUS PRN
Status: DISCONTINUED | OUTPATIENT
Start: 2021-05-17 | End: 2021-05-17 | Stop reason: SDUPTHER

## 2021-05-17 RX ORDER — FENTANYL CITRATE 50 UG/ML
50 INJECTION, SOLUTION INTRAMUSCULAR; INTRAVENOUS EVERY 5 MIN PRN
Status: DISCONTINUED | OUTPATIENT
Start: 2021-05-17 | End: 2021-05-17 | Stop reason: HOSPADM

## 2021-05-17 RX ORDER — PROPOFOL 10 MG/ML
INJECTION, EMULSION INTRAVENOUS PRN
Status: DISCONTINUED | OUTPATIENT
Start: 2021-05-17 | End: 2021-05-17 | Stop reason: SDUPTHER

## 2021-05-17 RX ORDER — OXYCODONE HYDROCHLORIDE 5 MG/1
5 TABLET ORAL EVERY 4 HOURS PRN
Status: DISCONTINUED | OUTPATIENT
Start: 2021-05-17 | End: 2021-05-20 | Stop reason: HOSPADM

## 2021-05-17 RX ORDER — PROMETHAZINE HYDROCHLORIDE 25 MG/ML
6.25 INJECTION, SOLUTION INTRAMUSCULAR; INTRAVENOUS
Status: DISCONTINUED | OUTPATIENT
Start: 2021-05-17 | End: 2021-05-17 | Stop reason: HOSPADM

## 2021-05-17 RX ORDER — LIDOCAINE HYDROCHLORIDE 10 MG/ML
0.3 INJECTION, SOLUTION EPIDURAL; INFILTRATION; INTRACAUDAL; PERINEURAL
Status: COMPLETED | OUTPATIENT
Start: 2021-05-17 | End: 2021-05-17

## 2021-05-17 RX ORDER — LIDOCAINE HYDROCHLORIDE 20 MG/ML
INJECTION, SOLUTION EPIDURAL; INFILTRATION; INTRACAUDAL; PERINEURAL PRN
Status: DISCONTINUED | OUTPATIENT
Start: 2021-05-17 | End: 2021-05-17 | Stop reason: SDUPTHER

## 2021-05-17 RX ORDER — SODIUM CHLORIDE 0.9 % (FLUSH) 0.9 %
5-40 SYRINGE (ML) INJECTION PRN
Status: DISCONTINUED | OUTPATIENT
Start: 2021-05-17 | End: 2021-05-20 | Stop reason: HOSPADM

## 2021-05-17 RX ORDER — SODIUM CHLORIDE 9 MG/ML
25 INJECTION, SOLUTION INTRAVENOUS PRN
Status: DISCONTINUED | OUTPATIENT
Start: 2021-05-17 | End: 2021-05-17 | Stop reason: HOSPADM

## 2021-05-17 RX ORDER — SODIUM CHLORIDE 9 MG/ML
25 INJECTION, SOLUTION INTRAVENOUS PRN
Status: DISCONTINUED | OUTPATIENT
Start: 2021-05-17 | End: 2021-05-20 | Stop reason: HOSPADM

## 2021-05-17 RX ORDER — SODIUM CHLORIDE 0.9 % (FLUSH) 0.9 %
5-40 SYRINGE (ML) INJECTION EVERY 12 HOURS SCHEDULED
Status: DISCONTINUED | OUTPATIENT
Start: 2021-05-17 | End: 2021-05-17 | Stop reason: HOSPADM

## 2021-05-17 RX ORDER — SODIUM CHLORIDE 0.9 % (FLUSH) 0.9 %
5-40 SYRINGE (ML) INJECTION PRN
Status: DISCONTINUED | OUTPATIENT
Start: 2021-05-17 | End: 2021-05-17 | Stop reason: HOSPADM

## 2021-05-17 RX ORDER — ONDANSETRON 2 MG/ML
4 INJECTION INTRAMUSCULAR; INTRAVENOUS
Status: DISCONTINUED | OUTPATIENT
Start: 2021-05-17 | End: 2021-05-17 | Stop reason: HOSPADM

## 2021-05-17 RX ORDER — TRANEXAMIC ACID 100 MG/ML
1000 INJECTION, SOLUTION INTRAVENOUS 2 TIMES DAILY PRN
Status: DISCONTINUED | OUTPATIENT
Start: 2021-05-17 | End: 2021-05-17 | Stop reason: HOSPADM

## 2021-05-17 RX ORDER — LABETALOL HYDROCHLORIDE 5 MG/ML
5 INJECTION, SOLUTION INTRAVENOUS EVERY 10 MIN PRN
Status: DISCONTINUED | OUTPATIENT
Start: 2021-05-17 | End: 2021-05-17 | Stop reason: HOSPADM

## 2021-05-17 RX ORDER — OMEPRAZOLE 20 MG/1
20 CAPSULE, DELAYED RELEASE ORAL DAILY
Status: DISCONTINUED | OUTPATIENT
Start: 2021-05-17 | End: 2021-05-20 | Stop reason: HOSPADM

## 2021-05-17 RX ORDER — SODIUM CHLORIDE, SODIUM LACTATE, POTASSIUM CHLORIDE, CALCIUM CHLORIDE 600; 310; 30; 20 MG/100ML; MG/100ML; MG/100ML; MG/100ML
INJECTION, SOLUTION INTRAVENOUS CONTINUOUS PRN
Status: DISCONTINUED | OUTPATIENT
Start: 2021-05-17 | End: 2021-05-17 | Stop reason: SDUPTHER

## 2021-05-17 RX ORDER — METOPROLOL SUCCINATE 50 MG/1
50 TABLET, EXTENDED RELEASE ORAL DAILY
Status: DISCONTINUED | OUTPATIENT
Start: 2021-05-17 | End: 2021-05-20 | Stop reason: HOSPADM

## 2021-05-17 RX ORDER — MEPERIDINE HYDROCHLORIDE 25 MG/ML
12.5 INJECTION INTRAMUSCULAR; INTRAVENOUS; SUBCUTANEOUS EVERY 5 MIN PRN
Status: DISCONTINUED | OUTPATIENT
Start: 2021-05-17 | End: 2021-05-17 | Stop reason: HOSPADM

## 2021-05-17 RX ORDER — HYDRALAZINE HYDROCHLORIDE 20 MG/ML
5 INJECTION INTRAMUSCULAR; INTRAVENOUS EVERY 10 MIN PRN
Status: DISCONTINUED | OUTPATIENT
Start: 2021-05-17 | End: 2021-05-17 | Stop reason: HOSPADM

## 2021-05-17 RX ORDER — SODIUM CHLORIDE 9 MG/ML
INJECTION, SOLUTION INTRAVENOUS CONTINUOUS
Status: DISCONTINUED | OUTPATIENT
Start: 2021-05-17 | End: 2021-05-18

## 2021-05-17 RX ORDER — DULOXETIN HYDROCHLORIDE 60 MG/1
60 CAPSULE, DELAYED RELEASE ORAL DAILY
Status: DISCONTINUED | OUTPATIENT
Start: 2021-05-17 | End: 2021-05-20 | Stop reason: HOSPADM

## 2021-05-17 RX ORDER — SENNA AND DOCUSATE SODIUM 50; 8.6 MG/1; MG/1
1 TABLET, FILM COATED ORAL 2 TIMES DAILY
Status: DISCONTINUED | OUTPATIENT
Start: 2021-05-17 | End: 2021-05-20 | Stop reason: HOSPADM

## 2021-05-17 RX ORDER — SODIUM CHLORIDE 0.9 % (FLUSH) 0.9 %
5-40 SYRINGE (ML) INJECTION EVERY 12 HOURS SCHEDULED
Status: DISCONTINUED | OUTPATIENT
Start: 2021-05-17 | End: 2021-05-20 | Stop reason: HOSPADM

## 2021-05-17 RX ORDER — HYDROCHLOROTHIAZIDE 25 MG/1
25 TABLET ORAL DAILY
Status: DISCONTINUED | OUTPATIENT
Start: 2021-05-17 | End: 2021-05-20 | Stop reason: HOSPADM

## 2021-05-17 RX ORDER — OXYCODONE HYDROCHLORIDE 5 MG/1
5 TABLET ORAL PRN
Status: DISCONTINUED | OUTPATIENT
Start: 2021-05-17 | End: 2021-05-17 | Stop reason: HOSPADM

## 2021-05-17 RX ORDER — PRAVASTATIN SODIUM 20 MG
20 TABLET ORAL DAILY
Status: DISCONTINUED | OUTPATIENT
Start: 2021-05-17 | End: 2021-05-20 | Stop reason: HOSPADM

## 2021-05-17 RX ORDER — ROCURONIUM BROMIDE 10 MG/ML
INJECTION, SOLUTION INTRAVENOUS PRN
Status: DISCONTINUED | OUTPATIENT
Start: 2021-05-17 | End: 2021-05-17 | Stop reason: SDUPTHER

## 2021-05-17 RX ORDER — METHIMAZOLE 5 MG/1
5 TABLET ORAL DAILY
Status: DISCONTINUED | OUTPATIENT
Start: 2021-05-17 | End: 2021-05-20 | Stop reason: HOSPADM

## 2021-05-17 RX ORDER — MAGNESIUM HYDROXIDE 1200 MG/15ML
LIQUID ORAL CONTINUOUS PRN
Status: COMPLETED | OUTPATIENT
Start: 2021-05-17 | End: 2021-05-17

## 2021-05-17 RX ORDER — SODIUM CHLORIDE, SODIUM LACTATE, POTASSIUM CHLORIDE, CALCIUM CHLORIDE 600; 310; 30; 20 MG/100ML; MG/100ML; MG/100ML; MG/100ML
INJECTION, SOLUTION INTRAVENOUS CONTINUOUS
Status: DISCONTINUED | OUTPATIENT
Start: 2021-05-17 | End: 2021-05-17

## 2021-05-17 RX ADMIN — LIDOCAINE HYDROCHLORIDE 100 MG: 20 INJECTION, SOLUTION EPIDURAL; INFILTRATION; INTRACAUDAL; PERINEURAL at 08:21

## 2021-05-17 RX ADMIN — SUGAMMADEX 200 MG: 100 INJECTION, SOLUTION INTRAVENOUS at 09:44

## 2021-05-17 RX ADMIN — ROCURONIUM BROMIDE 50 MG: 10 INJECTION, SOLUTION INTRAVENOUS at 08:21

## 2021-05-17 RX ADMIN — ASPIRIN 325 MG: 325 TABLET, COATED ORAL at 11:39

## 2021-05-17 RX ADMIN — ASPIRIN 325 MG: 325 TABLET, COATED ORAL at 20:39

## 2021-05-17 RX ADMIN — OMEPRAZOLE 20 MG: 20 CAPSULE, DELAYED RELEASE ORAL at 11:39

## 2021-05-17 RX ADMIN — STANDARDIZED SENNA CONCENTRATE AND DOCUSATE SODIUM 1 TABLET: 8.6; 5 TABLET ORAL at 11:39

## 2021-05-17 RX ADMIN — VANCOMYCIN HYDROCHLORIDE 1000 MG: 1 INJECTION, POWDER, LYOPHILIZED, FOR SOLUTION INTRAVENOUS at 20:39

## 2021-05-17 RX ADMIN — ONDANSETRON 4 MG: 2 INJECTION, SOLUTION INTRAMUSCULAR; INTRAVENOUS at 08:21

## 2021-05-17 RX ADMIN — FENTANYL CITRATE 50 MCG: 50 INJECTION INTRAMUSCULAR; INTRAVENOUS at 08:21

## 2021-05-17 RX ADMIN — SODIUM CHLORIDE, POTASSIUM CHLORIDE, SODIUM LACTATE AND CALCIUM CHLORIDE: 600; 310; 30; 20 INJECTION, SOLUTION INTRAVENOUS at 07:16

## 2021-05-17 RX ADMIN — ACETAMINOPHEN 650 MG: 325 TABLET ORAL at 18:45

## 2021-05-17 RX ADMIN — SODIUM CHLORIDE: 9 INJECTION, SOLUTION INTRAVENOUS at 11:36

## 2021-05-17 RX ADMIN — LIDOCAINE HYDROCHLORIDE 0.3 ML: 10 INJECTION, SOLUTION EPIDURAL; INFILTRATION; INTRACAUDAL; PERINEURAL at 07:17

## 2021-05-17 RX ADMIN — PROPOFOL 200 MG: 10 INJECTION, EMULSION INTRAVENOUS at 08:21

## 2021-05-17 RX ADMIN — HYDROCHLOROTHIAZIDE 25 MG: 25 TABLET ORAL at 11:39

## 2021-05-17 RX ADMIN — ACETAMINOPHEN 650 MG: 325 TABLET ORAL at 23:17

## 2021-05-17 RX ADMIN — FENTANYL CITRATE 50 MCG: 50 INJECTION INTRAMUSCULAR; INTRAVENOUS at 08:47

## 2021-05-17 RX ADMIN — PRAVASTATIN SODIUM 20 MG: 20 TABLET ORAL at 11:39

## 2021-05-17 RX ADMIN — DULOXETINE HYDROCHLORIDE 60 MG: 60 CAPSULE, DELAYED RELEASE ORAL at 11:39

## 2021-05-17 RX ADMIN — STANDARDIZED SENNA CONCENTRATE AND DOCUSATE SODIUM 1 TABLET: 8.6; 5 TABLET ORAL at 20:39

## 2021-05-17 RX ADMIN — METOPROLOL SUCCINATE 50 MG: 50 TABLET, EXTENDED RELEASE ORAL at 11:39

## 2021-05-17 RX ADMIN — METHIMAZOLE 5 MG: 5 TABLET ORAL at 11:39

## 2021-05-17 RX ADMIN — SODIUM CHLORIDE, POTASSIUM CHLORIDE, SODIUM LACTATE AND CALCIUM CHLORIDE: 600; 310; 30; 20 INJECTION, SOLUTION INTRAVENOUS at 08:10

## 2021-05-17 RX ADMIN — ACETAMINOPHEN 650 MG: 325 TABLET ORAL at 11:39

## 2021-05-17 ASSESSMENT — PULMONARY FUNCTION TESTS
PIF_VALUE: 16
PIF_VALUE: 18
PIF_VALUE: 0
PIF_VALUE: 19
PIF_VALUE: 0
PIF_VALUE: 17
PIF_VALUE: 18
PIF_VALUE: 19
PIF_VALUE: 17
PIF_VALUE: 19
PIF_VALUE: 16
PIF_VALUE: 18
PIF_VALUE: 17
PIF_VALUE: 13
PIF_VALUE: 17
PIF_VALUE: 0
PIF_VALUE: 18
PIF_VALUE: 0
PIF_VALUE: 0
PIF_VALUE: 16
PIF_VALUE: 13
PIF_VALUE: 18
PIF_VALUE: 19
PIF_VALUE: 19
PIF_VALUE: 18
PIF_VALUE: 0
PIF_VALUE: 0
PIF_VALUE: 18
PIF_VALUE: 19
PIF_VALUE: 18
PIF_VALUE: 16
PIF_VALUE: 18
PIF_VALUE: 23
PIF_VALUE: 2
PIF_VALUE: 18
PIF_VALUE: 0
PIF_VALUE: 2
PIF_VALUE: 13
PIF_VALUE: 17
PIF_VALUE: 4
PIF_VALUE: 19
PIF_VALUE: 19
PIF_VALUE: 18
PIF_VALUE: 17
PIF_VALUE: 0
PIF_VALUE: 3
PIF_VALUE: 19
PIF_VALUE: 1
PIF_VALUE: 19
PIF_VALUE: 26

## 2021-05-17 ASSESSMENT — PAIN DESCRIPTION - ORIENTATION: ORIENTATION: RIGHT

## 2021-05-17 ASSESSMENT — PAIN SCALES - GENERAL: PAINLEVEL_OUTOF10: 0

## 2021-05-17 NOTE — PROGRESS NOTES
Called and spoke with Jose Jauregui patient's spouse he is aware to move car around to front entrance and patient will be going to room 221. Kaleigh Del Valle RN

## 2021-05-17 NOTE — PROGRESS NOTES
Inpatient Occupational Therapy  Evaluation and Treatment    Unit: [address] [place]  Date:  5/17/2021  Patient Name:    Yeimi Elizondo  Admitting diagnosis:  Osteoarthritis of right knee, unspecified osteoarthritis type [M17.11]  Admit Date:  5/17/2021  Precautions/Restrictions/WB Status/ Lines/ Wounds/ Oxygen: Fall risk, Bed/chair alarm, Lines -IV, WB Restrictions (WBAT) and Knee immobilizer    Treatment Time:  3773- 8696  Treatment Number: 1   Timed code treatment minutes 40 minutes   Total Treatment minutes:   50  minutes    Patient Goals for Therapy:  \" go home  \"      Discharge Recommendations: Home 24 hr assist  and Home OT  DME needs for discharge: Needs Met       Therapy recommendations for staff:   Assist of 1 with use of rolling walker (RW) for all transfers to/from [facility]  to/from chair with gait belt and [place] 60 S4 Level Recommendation:  Level 3 Safety  AM-PAC Score: 21    Preadmission Environment      Pt. Lives with spouse  Home environment:            one story home  Steps to enter first floor: 1 steps to enter and hand rail bilateral (doorframe)  Steps to second floor:         N/A  Bathroom: walk in shower with shower chair  Equipment owned: RW, rollator, quad cane, reacher and hurrycane     Preadmission Status:  Pt. Able to drive: Yes ( usually drives)  Pt Fully independent with ADLs: Yes  Pt. Required assistance from family for: Cleaning and Cooking ()  Pt. independent for transfers and gait and walked with [employer]  History of falls          Yes - 1-2 falls due to knee buckling; more frequent falls prior to back surgery 2 years ago.      Pain   No  Location: N/A  Rating: NA /10  Pain Medicine Status: No request made        Cognition    A&O x4   Able to follow 2 step commands    Subjective  Patient lying supine in bed with family at bedside. Pt agreeable to this OT eval & tx.      Upper Extremity ROM:    WFL    Upper Extremity Strength:    WFL    Upper Extremity Sensation WFL    Upper Extremity Proprioception:  WFL    Coordination and Tone  WFL    Balance  Functional Sitting Balance:  WFL  Functional Standing Balance:Diminished    Bed mobility:    Supine to sit:   CGA  Sit to supine:   Not Tested  Rolling:    Not Tested  Scooting in sitting:  SBA  Scooting to head of bed:   Not Tested    Bridging:   Not Tested    Transfers:    Sit to stand:  CGA  Stand to sit:  CGA  Bed to chair:   CGA with RW   Standard toilet: Not Tested  Bed to [facility]:  Not Tested    Dressing:      UE:   Not Tested  LE:    CGA to don pull ups    Bathing:    UE:  Not Tested  LE:  Not Tested    Eating:   Independent    Toileting:  Not Tested    Activity Tolerance   Pt completed therapy session with decreased tolerance to activity   BP supine in bed 115/60 , Sp02 97% , 66HR   BP after transfer to chair 93/55 Sp02 97% and HR 60 - attempted 2x to get a BP when pt sat in chair and no reading was obtained. The pt became quiet and slow to respond for a brief time and then became more alert and BP then took after 3 rd attempt. Notified nurse covering for pts nurse of decreased BP and pts response. Positioning Needs:   Pt up in chair, alarm set, positioned in proper neutral alignment and pressure relief provided. Exercise / Activities Initiated:   N/A    Patient/Family Education:   Role of OT    Assessment of Deficits: Pt seen for Occupational therapy evaluation in acute care setting. Pt demonstrated decreased Activity tolerance, ADLs, Balance , Bed mobility and Transfers. Pt functioning below baseline and will likely benefit from skilled occupational therapy services to maximize safety and independence. Goal(s) : To be met in 3 Visits:  1). Bed to toilet/BSC: SBA with RW    To be met in 5 Visits:  1). Supine to/from Sit:  Modified Independent  2). Upper Body Bathing:   Independent  3). Lower Body Bathing:   SBA and CGA  4). Upper Body Dressing:  Independent  5). Lower Body Dressing:  SBA and CGA  6).  Pt to demonstrate UE exs x 15 reps with minimal cues    Rehabilitation Potential:  Good for goals listed above. Strengths for achieving goals include: Pt cooperative  Barriers to achieving goals include:  Complexity of condition     Plan: To be seen 5 x/wk while in acute care setting for therapeutic exercises, bed mobility, transfers, dressing, bathing, family/patient education, ADL/IADL retraining, energy conservation training.      Sergio Case OTR/L [id number]          If patient discharges from this facility prior to next visit, this note will serve as the Discharge Summary

## 2021-05-17 NOTE — PROGRESS NOTES
. 4 Eyes Skin Assessment   Four eyes skin assessment completed at this time by Alison Morris, RN   and Lila RN. Assessment revealed: Intact skin    The patient is being assessed for  Transfer to New Unit    I agree that 2 RN's have performed a thorough Head to Toe Skin Assessment on the patient. ALL assessment sites listed below have been assessed.        Areas assessed by both nurses:  [x]   Head, Face, and Ears   [x]   Shoulders, Back, and Chest  [x]   Arms, Elbows, and Hands   [x]   Coccyx, Sacrum, and Ischum  [x]   Legs, Feet, and Khandyan Watson, RN

## 2021-05-17 NOTE — BRIEF OP NOTE
Brief Postoperative Note      Patient: Lb Gerard  YOB: 1935  MRN: [id number]    Date of Procedure: 5/17/2021    Pre-Op Diagnosis: RIGHT KNEE OSTEOARTHRITIS    Post-Op Diagnosis: Same       Procedure(s):  RIGHT TOTAL KNEE REPLACEMENT    Surgeon(s):  Abram Rubio MD    Assistant:  Surgical Assistant: Anastacio Alvraez    Anesthesia: Spinal    Estimated Blood Loss (mL): less than 50     Complications: None    Specimens:   * No specimens in log *    Implants:  Implant Name Type Inv.  Item Serial No.  Lot No. LRB No. Used Action   CEMENT BNE 40GM W/ GENT HI VISC RADPQ FOR REV SURG  CEMENT BNE 40GM W/ GENT HI VISC RADPQ FOR REV SURG  [employer] [id number] Right 1 Implanted   CEMENT BNE 40GM W/ GENT HI VISC RADPQ FOR REV SURG  CEMENT BNE 40GM W/ GENT HI VISC RADPQ FOR REV SURG  [employer] [id number] Right 1 Implanted   TRAY TIB SZ 4 NP A BAL KNEE  TRAY TIB SZ 4 NP A BAL KNEE  [employer] [id number] Right 1 Implanted   COMPONENT PAT H32MM STD E VITALIZE CATHY ANA LAURA RND BAL SYS  COMPONENT PAT H32MM STD E VITALIZE CATHY ANA LAURA RND BAL SYS  [employer] [id number] Right 1 Implanted   COMPONENT FEM SZ 5 R KNEE NP CRUCE RET BKS TRIMAX  COMPONENT FEM SZ 5 R KNEE NP CRUCE RET BKS TRIMAX  [employer] [id number] Right 1 Implanted   INSERT TIB SZ 4 THK7MM CRUCE RET BKS E VITALIZE  INSERT TIB SZ 4 THK7MM CRUCE RET BKS E VITALIZE  [employer] [id number] Right 1 Implanted         Drains: * No LDAs found *    Findings: tamar    Electronically signed by Abram Rubio MD on 5/17/2021 at 9:29 AM

## 2021-05-17 NOTE — PROGRESS NOTES
.Patient left the floor in stable condition to room 221 with transport and all belongings. Alison Morris RN

## 2021-05-17 NOTE — PROGRESS NOTES
4 Eyes Skin Assessment   Four eyes skin assessment completed at this time by Carolina Horowitz RN   and KRISTIN Darden. Assessment revealed: Intact skin    The patient is being assessed for  Admission redness, rashes, sores, and abrasions to pt skin. No noted areas of concern. I agree that 2 RN's have performed a thorough Head to Toe Skin Assessment on the patient. ALL assessment sites listed below have been assessed.        Areas assessed by both nurses:  [x]   Head, Face, and Ears   [x]   Shoulders, Back, and Chest  [x]   Arms, Elbows, and Hands   [x]   Coccyx, Sacrum, and Ischum  [x]   Legs, Feet, and Heels              Carolina Horowitz RN

## 2021-05-17 NOTE — PLAN OF CARE
Problem: Mobility - Impaired:  Goal: Mobility will improve  Description: Mobility will improve  Outcome: Ongoing     Problem: Pain - Acute:  Goal: Pain level will decrease  Description: Pain level will decrease  Outcome: Ongoing

## 2021-05-17 NOTE — PROGRESS NOTES
Bedside report received from [address] [place] [address] and Angeli Lackey, kenrick resting in bed vitals WNL to pre-op baseline, right knee dressing C/D/I ice pack applied good pedal pulses noted. Geri Champion RN

## 2021-05-17 NOTE — OP NOTE
[address]                 [address] [facility] [address]                                OPERATIVE REPORT    PATIENT NAME: Earley Prader                  :        1935  MED REC NO:   [id number]                          ROOM:       0221  ACCOUNT NO:   [de-identified]                           ADMIT DATE: 2021  PROVIDER:     Orin Lindsay MD    DATE OF PROCEDURE:  2021    PREOPERATIVE DIAGNOSIS:  Right knee osteoarthritis. POSTOPERATIVE DIAGNOSIS:  Right knee osteoarthritis. OPERATION PERFORMED:  Right total knee replacement. SURGEON:  Orin Lindsay MD    ANESTHESIA:  General, leg block. BLOOD LOSS:  Less than 50 mL. COMPLICATIONS:  None noted. INDICATIONS FOR OPERATION:  This 79-year-old female who had a history,  exam, and testing consistent with severe end-stage osteoarthritis, and  after failure to improve with conservative measures over six plus  months, she elected for further recommendation of surgical intervention  via total knee replacement. DESCRIPTION OF THE OPERATION:  The patient was taken to the operating  room where a general leg block anesthetic had been obtained. Antibiotics were given IV piggyback preoperatively. A tourniquet was  placed around the right proximal thigh. The right knee was sterilely  prepped and draped. The leg was exsanguinated with an Esmarch, and the  tourniquet was inflated to 350 mmHg. A longitudinal incision was then  made over the anterior knee, and soft tissue dissected down through skin  and through subcutaneous tissue in midline anterior approach. A medial  parapatellar approach was then performed and the patella was everted. The knee was flexed and the fat pad was removed. Using the [employer] total knee replacement system, an  intramedullary guide was placed in the femur. Using the 7-degree valgus  cut, a flat cut was made.   It was sized for a size 5 right Skin was closed with 4-0 Monocryl in a subcuticular stitch and  Dermabond will be used to glue the skin. The wound will be dressed in  appropriate manner and placed in a knee immobilizer. The patient appeared to tolerate the procedure well and will be taken to  the recovery room in stable condition.         Jerilyn Mayen MD    D: 05/17/2021 9:45:05       T: 05/17/2021 12:40:02     [email or web address]  Job#: [id number]     Doc#: [id number]    CC:

## 2021-05-17 NOTE — PROGRESS NOTES
Patient admitted to room __221__ from the PACU. Patient oriented to room, call light, bed rails, phone, lights and bathroom. Patient instructed about the schedule of the day including: vital sign frequency, lab draws, possible tests, frequency of MD and staff rounds, daily weights, I &O's and prescribed diet. Bed alarm in place. Bed locked, in lowest position, side rails up 2/4, call light within reach. Recliner Assessment:     Patient is not able to demonstrated the ability to move from a reclining position to an upright position within the recliner. however patient is alert, oriented and able to provide informed consent          4 Eyes Skin Assessment:     The patient is being assess for   Admission    I agree that 2 RN's have performed a thorough Head to Toe Skin Assessment on the patient. ALL assessment sites listed below have been assessed. Areas assessed by both nurses:   [x]   Head, Face, and Ears   [x]   Shoulders, Back, and Chest, Abdomen  [x]   Arms, Elbows, and Hands   [x]   Coccyx, Sacrum, and Ischium  [x]   Legs, Feet, and Heels        Completed with Jeovany Fontaine in PACU. Incision to right knee unable to be viewed d/t surgical dressing. No drainage noted. **SHARE this note so that the co-signing nurse is able to place an eSignature**      Does the Patient have Skin Breakdown?   No          Nitin Prevention initiated:  NA   Wound Care Orders initiated:  NA      [facility] nurse consulted for Pressure Injury (Stage 3,4, Unstageable, DTI, NWPT, Complex wounds)and New or Established Ostomies:  NA      Primary Nurse eSignature: Electronically signed by Dhara Alfredo RN on 5/17/21 at 11:25 AM EDT

## 2021-05-17 NOTE — PROGRESS NOTES
Inpatient Physical Therapy Evaluation and Treatment    Unit: [facility]  Date:  5/17/2021  Patient Name:    Jessa River  Admitting diagnosis:  Osteoarthritis of right knee, unspecified osteoarthritis type [M17.11]  Admit Date:  5/17/2021  Precautions/Restrictions/WB Status/ Lines/ Wounds/ Oxygen: Fall risk, Bed/chair alarm, Lines -IV, WB Restrictions (WBAT RLE) and Knee immobilizer     Treatment Time:  2479- 0455  Treatment Number:  1   Timed Code Treatment Minutes: 38 minutes  Total Treatment Minutes:  48  minutes    Patient Goals for Therapy: \" to go home \"          Discharge Recommendations: Home 24 hr assist  and Home PT  DME needs for discharge: Needs Met       Therapy recommendation for EMS Transport: can transport by wheelchair    Therapy recommendations for staff:   Assist of 1 with use of rolling walker (RW) and gait belt for all transfers and ambulation to/from [facility]  to/from chair    History of Present Illness:   Elective R TKA 5/17    Home Health S4 Level Recommendation:  Level 3 Safety  AM-PAC Mobility Score       AM-PAC Inpatient Mobility without Stair Climbing Raw Score : 15    Preadmission Environment    Pt. Lives with spouse  Home environment:  one story home  Steps to enter first floor: 1 steps to enter and hand rail bilateral (doorframe)  Steps to second floor: N/A  Bathroom: walk in shower with shower chair  Equipment owned: RW, rollator, quad cane, reacher and hurrycane    Preadmission Status:  Pt. Able to drive: Yes ( usually drives)  Pt Fully independent with ADLs: Yes  Pt. Required assistance from family for: Cleaning and Cooking ()  Pt. independent for transfers and gait and walked with [employer]  History of falls Yes - 1-2 falls due to knee buckling; more frequent falls prior to back surgery 2 years ago.      Pain   No  Location: N/A  Rating: NA /10  Pain Medicine Status: No request made    Cognition    A&O x4   Able to follow 2 step commands    Subjective  Patient lying supine in bed with family at bedside. Pt agreeable to this PT eval & tx. Upper Extremity ROM/Strength  Please see OT evaluation. Lower Extremity ROM / Strength   LLE AROM WFL: Yes   RLE hip and ankle AROM WFL: Yes  R knee flexion AROM to 52* in supine, ~80* (not measured) in sitting; knee extension AROM lacking 10* in supine (limited by Ace bandage). BLE strength impaired, but not formally assessed with MMT. LLE grossly at least 3+/5 based on demonstrated mobility. RLE at least 3+/5 at hip and ankle based on demonstrated mobility. Lower Extremity Sensation    Diminished to light touch at R foot. Lower Extremity Proprioception:   WFL    Coordination and Tone  WFL    Balance  Sitting:  Good ; SBA  Comments: at EOB    Standing: Fair ; CGA  Comments: with RW    Bed Mobility   Supine to Sit:    SBA  Sit to Supine:   Not Tested  Rolling:   Not Tested  Scooting in sitting: SBA  Scooting in supine:  Not Tested    Transfer Training     Sit to stand:   Min A  and 2 persons  Stand to sit:   CGA  Bed to Chair:   CGA with use of gait belt, rolling walker (RW) and knee immobilizer on right    Gait gait completed as indicated below  Distance:      15 ft  Deviations (firm surface/linoleum):  decreased guillaume, forward flexed posture, step to pattern, antalgic pattern and decreased step length on right  Assistive Device Used:    rolling walker (RW). Knee immobilizer off. Note - staff instructed to mobilize pt with KI on for remainder of day given pt's drop in BP while ambulating. Level of Assist:    CGA  Comment: Pt is slightly impulsive with ambulation but responsive to cuing. Stair Training deferred, pt unsafe/ not appropriate to complete stairs at this time    Activity Tolerance   Pt completed therapy session with Dizziness noted with ambulation. After ambulating and sitting to recliner, pt became dizzy and slow to respond for ~30 seconds.  After propping feet, patient reported improvement in symptoms and level of alertness returned. BP (mmHg)  HR (bpm)  SpO2 (%)    Supine before activity   115/60  66  97% on RA    EOB        Seated after ambulation   93/55 - note room BP monitor not working, after bringing portable cart into room this reading was obtained. Pt likely with lower BP initially upon sitting. 60  97%     RN away on lunch, backup RN notified. Positioning Needs   Pt reclined in chair, alarm set, positioned in proper neutral alignment and pressure relief provided. Exercises Initiated  all completed bilaterally unless indicated  Ankle Pumps x 10 reps  Glut sets x 10 reps  Quad sets x 10 reps  Heel slides x 10 reps  LAQ x 10 reps  SLR x 10 reps  Hip abduction: x 10 reps    Other  None. Patient/Family Education   Pt educated on role of inpatient PT, POC, importance of continued activity, DC recommendations, safety awareness, energy conservation and calling for assist with mobility. Assessment  Pt seen for Physical Therapy evaluation in acute care setting. Pt demonstrated decreased Activity tolerance, Balance, ROM, Safety and Strength as well as decreased independence with Ambulation, Bed Mobility  and Transfers. Pt completed mobility and exercises relatively well but did experience a drop in BP with ambulation, recovering with reclined rest break. Pt is ambitious and mildly impulsive thus will benefit from reinforcement of safety habits and caution with activity. Pt will benefit from skilled PT in acute setting to promote activity tolerance and independent functional mobility. Recommending Home 24 hr assist and with home PT upon discharge as patient functioning below baseline level and would benefit from continued therapy services. Goals : To be met in 3 visits:  1). Independent with LE Ex x 10 reps    To be met in 6 visits:  1). Supine to/from sit: Supervision  2). Sit to/from stand: Supervision  3). Bed to chair: Supervision  4).   Gait: Ambulate 50 ft.  with  Supervision and use of rolling walker (RW)  5). Tolerate B LE exercises 3 sets of 10-15 reps  6). Ascend/descend 1 steps with SBA with use of no handrail and rolling walker (RW)    Rehabilitation Potential: Good  Strengths for achieving goals include:   Pt motivated, PLOF, Family Support and Pt cooperative   Barriers to achieving goals include:    No Barriers    Plan    To be seen 5-7 x / week BID while in acute care setting for therapeutic exercises, bed mobility, transfers, progressive gait training, balance training, and family/patient education. Signature: Becki Brown, PT, DPT    If patient discharges from this facility prior to next visit, this note will serve as the Discharge Summary.

## 2021-05-18 PROBLEM — Z96.651 STATUS POST TOTAL RIGHT KNEE REPLACEMENT: Status: ACTIVE | Noted: 2021-05-18

## 2021-05-18 LAB
BASOPHILS ABSOLUTE: 0.1 K/UL (ref 0–0.2)
BASOPHILS RELATIVE PERCENT: 0.5 %
EOSINOPHILS ABSOLUTE: 0.1 K/UL (ref 0–0.6)
EOSINOPHILS RELATIVE PERCENT: 0.7 %
HCT VFR BLD CALC: 31 % (ref 36–48)
HEMOGLOBIN: 10.4 G/DL (ref 12–16)
LYMPHOCYTES ABSOLUTE: 1.5 K/UL (ref 1–5.1)
LYMPHOCYTES RELATIVE PERCENT: 14.1 %
MCH RBC QN AUTO: 30.9 PG (ref 26–34)
MCHC RBC AUTO-ENTMCNC: 33.7 G/DL (ref 31–36)
MCV RBC AUTO: 91.8 FL (ref 80–100)
MONOCYTES ABSOLUTE: 1 K/UL (ref 0–1.3)
MONOCYTES RELATIVE PERCENT: 9.6 %
NEUTROPHILS ABSOLUTE: 8 K/UL (ref 1.7–7.7)
NEUTROPHILS RELATIVE PERCENT: 75.1 %
PDW BLD-RTO: 14 % (ref 12.4–15.4)
PLATELET # BLD: 177 K/UL (ref 135–450)
PMV BLD AUTO: 9.1 FL (ref 5–10.5)
RBC # BLD: 3.38 M/UL (ref 4–5.2)
WBC # BLD: 10.6 K/UL (ref 4–11)

## 2021-05-18 PROCEDURE — 97535 SELF CARE MNGMENT TRAINING: CPT

## 2021-05-18 PROCEDURE — 36415 COLL VENOUS BLD VENIPUNCTURE: CPT

## 2021-05-18 PROCEDURE — APPNB30 APP NON BILLABLE TIME 0-30 MINS: Performed by: PHYSICIAN ASSISTANT

## 2021-05-18 PROCEDURE — 99223 1ST HOSP IP/OBS HIGH 75: CPT | Performed by: INTERNAL MEDICINE

## 2021-05-18 PROCEDURE — 85025 COMPLETE CBC W/AUTO DIFF WBC: CPT

## 2021-05-18 PROCEDURE — 1200000000 HC SEMI PRIVATE

## 2021-05-18 PROCEDURE — 97530 THERAPEUTIC ACTIVITIES: CPT

## 2021-05-18 PROCEDURE — 97116 GAIT TRAINING THERAPY: CPT

## 2021-05-18 PROCEDURE — 2580000003 HC RX 258: Performed by: ORTHOPAEDIC SURGERY

## 2021-05-18 PROCEDURE — 97110 THERAPEUTIC EXERCISES: CPT

## 2021-05-18 PROCEDURE — 6370000000 HC RX 637 (ALT 250 FOR IP): Performed by: ORTHOPAEDIC SURGERY

## 2021-05-18 RX ORDER — ACETAMINOPHEN 325 MG/1
650 TABLET ORAL EVERY 6 HOURS PRN
Qty: 120 TABLET | Refills: 0 | COMMUNITY
Start: 2021-05-18

## 2021-05-18 RX ADMIN — Medication 10 ML: at 09:56

## 2021-05-18 RX ADMIN — ACETAMINOPHEN 650 MG: 325 TABLET ORAL at 22:38

## 2021-05-18 RX ADMIN — Medication 10 ML: at 20:15

## 2021-05-18 RX ADMIN — METOPROLOL SUCCINATE 50 MG: 50 TABLET, EXTENDED RELEASE ORAL at 09:55

## 2021-05-18 RX ADMIN — ACETAMINOPHEN 650 MG: 325 TABLET ORAL at 05:14

## 2021-05-18 RX ADMIN — ASPIRIN 325 MG: 325 TABLET, COATED ORAL at 20:15

## 2021-05-18 RX ADMIN — ACETAMINOPHEN 650 MG: 325 TABLET ORAL at 17:26

## 2021-05-18 RX ADMIN — STANDARDIZED SENNA CONCENTRATE AND DOCUSATE SODIUM 1 TABLET: 8.6; 5 TABLET ORAL at 20:16

## 2021-05-18 RX ADMIN — STANDARDIZED SENNA CONCENTRATE AND DOCUSATE SODIUM 1 TABLET: 8.6; 5 TABLET ORAL at 09:55

## 2021-05-18 RX ADMIN — OMEPRAZOLE 20 MG: 20 CAPSULE, DELAYED RELEASE ORAL at 09:55

## 2021-05-18 RX ADMIN — ACETAMINOPHEN 650 MG: 325 TABLET ORAL at 11:55

## 2021-05-18 RX ADMIN — METHIMAZOLE 5 MG: 5 TABLET ORAL at 09:55

## 2021-05-18 RX ADMIN — PRAVASTATIN SODIUM 20 MG: 20 TABLET ORAL at 09:54

## 2021-05-18 RX ADMIN — DULOXETINE HYDROCHLORIDE 60 MG: 60 CAPSULE, DELAYED RELEASE ORAL at 09:55

## 2021-05-18 RX ADMIN — ASPIRIN 325 MG: 325 TABLET, COATED ORAL at 09:55

## 2021-05-18 ASSESSMENT — PAIN DESCRIPTION - ORIENTATION
ORIENTATION: RIGHT

## 2021-05-18 ASSESSMENT — PAIN DESCRIPTION - PROGRESSION: CLINICAL_PROGRESSION: GRADUALLY WORSENING

## 2021-05-18 ASSESSMENT — PAIN DESCRIPTION - PAIN TYPE
TYPE: SURGICAL PAIN

## 2021-05-18 ASSESSMENT — PAIN DESCRIPTION - LOCATION
LOCATION: KNEE
LOCATION: KNEE

## 2021-05-18 ASSESSMENT — PAIN DESCRIPTION - DESCRIPTORS: DESCRIPTORS: SORE

## 2021-05-18 ASSESSMENT — PAIN DESCRIPTION - FREQUENCY: FREQUENCY: CONTINUOUS

## 2021-05-18 ASSESSMENT — PAIN SCALES - GENERAL
PAINLEVEL_OUTOF10: 4
PAINLEVEL_OUTOF10: 0
PAINLEVEL_OUTOF10: 3
PAINLEVEL_OUTOF10: 3
PAINLEVEL_OUTOF10: 0
PAINLEVEL_OUTOF10: 0
PAINLEVEL_OUTOF10: 2

## 2021-05-18 NOTE — PROGRESS NOTES
Occupational Therapy Daily Treatment Note    Unit: [facility]  Date:  5/18/2021  Patient Name:    Rosanne Stevens  Admitting diagnosis:  Osteoarthritis of right knee, unspecified osteoarthritis type [M17.11]  Admit Date:  5/17/2021  Precautions/Restrictions:    Fall risk, Bed/chair alarm, Lines -IV, WB Restrictions (WBAT) and Knee immobilizer      Discharge Recommendations: Home 24 hr assist  and Home OT  DME needs for discharge: Shower Chair and sock aide       Therapy recommendations for staff:   Assist of 1 with use of standard walker (SW) for all transfers to/from chair    AM-PAC Score: AM-PAC Inpatient Daily Activity Raw Score: 21  Home Health S4 Level: Level 3- Safety        Treatment Time:  3735-1957  Treatment number:  2   Timed code treatment minutes: 40 minutes  Total treatment minutes:  40 minutes      Subjective:  Pt in the bed and willing to work with OT     Pain   Yes  Rating: moderate  Location:rt knee  Pain Medicine Status: No request made      Bed Mobility:   Supine to Sit:  SBA  Sit to Supine:  SBA  Rolling:           Not Tested  Scooting:        Supervision    Transfer Training:   Sit to stand: Mod A   Stand to sit:  Mod A   Bed to Chair:  CGA  Bed to [facility]:   Not Tested  Standard toilet:   Not Tested    Activity Tolerance   Pt completed therapy session with   /67  ADL Training:   Upper body dressing: Not Tested  Upper body bathing:  Not Tested  Lower body dressing:  CGA and Min A with AD as needed   Lower body bathing:  Not Tested  Toileting:   Not Tested  Grooming/Hygiene:  Not Tested    Therapeutic Exercise:   N/A    Patient Education:   Role of OT  Recommendations for DC  Use of AE    Positioning Needs:   Pt in bed, alarm set, positioned in proper neutral alignment and pressure relief provided. Family Present:  Yes    Assessment:   The pt was SBA for supine to sit . The pt was mod assist to stand from the bed to the RW. The pt needed CGA assist with use of RW for transfer.  The pt receptive to the use of AD to assist with increased independence for ADLs. The pt needs continued OT at this time. GOALS    To be met in 3 Visits:  1). Bed to toilet/BSC: SBA with RW     To be met in 5 Visits:  1). Supine to/from Sit:             Modified Independent  2). Upper Body Bathing:         Independent  3). Lower Body Bathing:         SBA and CGA  4). Upper Body Dressing:       Independent  5). Lower Body Dressing:       SBA and CGA  6).  Pt to demonstrate UE exs x 15 reps with minimal cues    Plan: cont with POC      Sharri Canales OTR/L [id number]      If patient discharges from this facility prior to next visit, this note will serve as the Discharge Summary

## 2021-05-18 NOTE — PROGRESS NOTES
Inpatient Physical Therapy Daily Treatment Note    Unit: [facility]  Date:  5/18/2021  Patient Name:    Lb Gerard  Admitting diagnosis:  Osteoarthritis of right knee, unspecified osteoarthritis type [M17.11]  Admit Date:  5/17/2021  Precautions/Restrictions:  Fall risk, Bed/chair alarm and WB Restrictions (FWB RLE)   Knee immobilizer discontinued. Discharge Recommendations: Home 24 hr assist  and Home PT  DME needs for discharge: Needs Met       Therapy recommendation for EMS Transport: can transport by wheelchair    Therapy recommendations for staff:   Assist of 1 with use of rolling walker (RW) and gait belt for all transfers and ambulation to/from [facility]  to/from chair  to/from bathroom    History of Present Illness:   Elective R TKA 5/17    Home Health S4 Level Recommendation: Level 3 Safety  AM-PAC Mobility Score      AM-PAC Inpatient Mobility without Stair Climbing Raw Score : 15    Treatment Time:  262-550  Treatment number: 2  Timed Code Treatment Minutes: 58 minutes  Total Treatment Minutes:  58 minutes    Cognition    A&O orientation not directly assessed. Able to follow 2 step commands    Subjective  Patient lying supine in bed with no family present   Pt agreeable to this PT tx. Pain   Yes  Location: R knee   Rating:    3/10  Pain Medicine Status: Denies need     Bed Mobility   Supine to Sit:    SBA With HOB elevated 30* and additional time to complete  Sit to Supine:   SBA with HOB flat and no use of bedrails. Rolling:   Not Tested  Scooting:   Supervision to EOB in sitting    Transfer Training     Sit to stand:   Min A and 3 attempts initially from EOB; then CGA for transfers from [facility], recliner, and EOB. Stand to sit:   CGA to [facility], recliner, EOB. Cues needed to maintain hands on walker while completing the approach/turn to chair. Long discussion on safety behaviors completed and patient verbalized understanding.   Bed to Chair:   CGA with use of rolling walker (RW)   Car Transfer:  Therapist demoed technique and patient verbalized understanding. Gait Training gait completed as indicated below  Distance:      35 ft + 10 ft  Deviations (firm surface/linoleum):  decreased guillaume, forward flexed posture, step to pattern, decreased step length bilaterally and difficulty with sequencing. Assistive Device Used:    rolling walker (RW)  Level of Assist:    CGA  Comment: Cues needed for step sequencing and keeping the walker close to the body. Stair Training Patient deferred; will attempt during PM visit. # of Steps:   N/A  Level of Assist:  N/A  UE Support:  NA  Assistive Device:  N/A  Pattern:   N/A  Comments: N/A    Therapeutic Exercise all completed bilaterally unless indicated  Ankle Pumps x 15 reps  Quad sets x 10 reps  Heel slides x 10 reps  LAQ x 10 reps  SLR x 10 reps  Hip abduction: x 10 reps  Written HEP provided. Balance  Sitting:  Good ; Independent  Comments: at EOB    Standing: Fair +; CGA  Comments: with RW. Flexed posture. Patient Education      Role of PT, POC, Discharge recommendations, safety awareness, transfer techniques, pacing activity, HEP and calling for assist with mobility. Positioning Needs       Pt in bed, alarm set, positioned in proper neutral alignment and pressure relief provided. Call light provided and all needs within reach    ROM Measurements   Knee Flexion: AROM to 64* in supine; PROM to 80* in sitting (limited by ACE bandage)  Knee Extension: AROM to lacking 15* in supine (limited by ACE bandage)    Activity Tolerance   Pt completed therapy session with No adverse symptoms noted w/activity. Pt does c/o \"shakiness\" while ambulating. No c/o dizziness/lightheadedness. BP (mmHg)  HR (bpm)  SpO2 (%)    Supine before activity        EOB  105/43  73     Seated after ambulation  125/63  62        Other  None. Assessment :  Patient is completing mobility grossly at [facility].  Initially she did require multiple attempts and Min A as

## 2021-05-18 NOTE — PROGRESS NOTES
rolling walker (RW)     Gait Training gait completed as indicated below  Distance:      40 ft + 20 ft including curb step up/down + 7 ft  Deviations (firm surface/linoleum):  decreased guillaume, forward flexed posture, decreased step length bilaterally and initial difficulty with step-to sequencing, but patient transitioned to step-through sequencing appropriately without cuing. Assistive Device Used:    rolling walker (RW)  Level of Assist:    CGA  Comment: Occasional cues for keeping the walker close to the body. Stair Training stairs completed as indicated below  # of Steps:   1 (curb step)  Level of Assist:  CGA  UE Support:  no handrail  Assistive Device:  RW  Pattern:   non-reciprocal pattern  Comments: Therapist provided demo and verbal training; pt then able to complete without cues. Therapeutic Exercise all completed bilaterally unless indicated  Ankle Pumps x 15 reps  Quad sets x 10 reps  Heel slides x 10 reps  SLR x 10 reps  Seated heel slides x 10 reps    Balance  Sitting:  Good ; Independent  Comments: at EOB    Standing: Fair +; CGA  Comments: with RW. Flexed posture. Patient Education      Role of PT, POC, Discharge recommendations, safety awareness, transfer techniques, pacing activity, HEP and calling for assist with mobility. Positioning Needs       Pt in bed, alarm set, positioned in proper neutral alignment and pressure relief provided. Call light provided and all needs within reach    ROM Measurements   Knee Flexion: AROM to 55* in supine; PROM to 90* in sitting (ACE bandage off)  Knee Extension: AROM to lacking 10* in supine    Activity Tolerance   Pt completed therapy session with No adverse symptoms noted w/activity. No c/o dizziness/lightheadedness. BP (mmHg)  HR (bpm)  SpO2 (%)    Supine before activity        EOB  119/57 69     Seated after ambulation            Other  None. Assessment :  Patient is progressing well with therapy activities.  She is completing all mobility at CGA-SBA level and progressing with improved weight bearing tolerance despite being at 8/10 pain at present. Pt self-transitions from step-to to step-through gait pattern this afternoon. She still needs occasional cues for safe walker management during ambulation and upon approach/turns to chairs but is showing improvement between and within sessions. Pt trialed 1 curb step with walker and was able to demo appropriate sequencing without cues. Recommending Home 24 hr assist and with home PT upon discharge as patient functioning below baseline level and would benefit from continued skilled PT. Goals (all goals ongoing unless otherwise indicated)  To be met in 3 visits:  1). Independent with LE Ex x 10 reps - 5/18 progressing, written HEP provided and reinforced. Will continue to reinforce.     To be met in 6 visits:  1). Supine to/from sit: Supervision  2). Sit to/from stand: Supervision  3). Bed to chair: Supervision  4). Gait: Ambulate 50 ft.  with  Supervision and use of rolling walker (RW)  5). Tolerate B LE exercises 3 sets of 10-15 reps  6). Ascend/descend 1 steps with SBA with use of no handrail and rolling walker (RW)    Plan   Continue with plan of care. Signature: Patricio Ndiaye, PT, DPT    If patient discharges from this facility prior to next visit, this note will serve as the Discharge Summary.

## 2021-05-18 NOTE — FLOWSHEET NOTE
05/18/21 0930   Vital Signs   Temp 97.9 °F (36.6 °C)   Temp Source Oral   Pulse 67   Heart Rate Source Monitor   Resp 16   /64   BP Location Left upper arm   Patient Position Semi fowlers   Patient Currently in Pain Denies   Pain Assessment   Pain Assessment 0-10   Pain Level 1   Pain Type Surgical pain   Pain Location Knee   Pain Orientation Right   Non-Pharmaceutical Pain Intervention(s) Rest  (pt declines intervention)   Oxygen Therapy   SpO2 93 %   O2 Device None (Room air)   AM assessment completed, see flow sheet. Pt had RTKR 5/17/21. Pt is alert and oriented. Vital signs are WNL, HCTZ held for now d/t BP dropping during PT. Respirations are even & easy. No complaints voiced. Pt denies needs as well as pain at this time. SR up x 2, and bed in low position. Call light is within reach.

## 2021-05-18 NOTE — PROGRESS NOTES
Removed bandage wrap per day 1 order. Small amount of old serosanguinous drainage noted on mepilex. Dressing dry and intact. Minimal swelling on operable knee. Denies needs, call light in reach.

## 2021-05-18 NOTE — PROGRESS NOTES
pt only taking tylenol at this time. 5:  D/c planning for home with Speedy Arce when stable and fully participates with therapy.  at home to assist.  DCP to follow. Most likely d/c tomorrow.     Yehuda LESTERC

## 2021-05-18 NOTE — FLOWSHEET NOTE
[place] couple supporting one another. Shared reminisces, family stories, kendra of grandchildren and children being successful in their education and work. 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 Jehovah's witness; patient asked for prayer for \"this surgery and the next\" as she expects to have a second knee surgery shortly.  validated feelings of concern and worry, prayed as requested. Wilfredochelsey Caesar  [phone]       05/18/21 6346   Encounter Summary   Services provided to: Patient and family together   Referral/Consult From: [address] [place] [address]; Family members; Children   Continue Visiting   (5/18: visit with spouse, pt, spouse worried, prayer)   Complexity of Encounter Moderate   Length of Encounter 1 hour   Spiritual Assessment Completed Yes   Spiritual/Mormonism   Type Spiritual support   Assessment Approachable; Hopeful; Anxious  (spouse shared concerns with Lashon, worried, prayer ab worry)   Intervention Nurtured hope; Active listening;Prayer;Scripture   Outcome Expressed gratitude;Coping

## 2021-05-18 NOTE — DISCHARGE INSTR - COC
Continuity of Care Form    Patient Name: Shella Skiff   :  1935  MRN:  [id number]    Admit date:  2021  Discharge date:  2021    Code Status Order: Full Code   Advance Directives:   Advance Care Flowsheet Documentation       Date/Time Healthcare Directive Type of Healthcare Directive Copy in [address] Agent's Name Healthcare Agent's Phone Number    21 1108  Yes, patient has an advance directive for healthcare treatment  Durable power of  for health care  No, copy requested from family  Legal González Cottrell  --    21 0649  Yes, patient has an advance directive for healthcare treatment  --  No, copy requested from family  --  --  --    21 1052  Yes, patient has an advance directive for healthcare treatment  --  No, copy requested from family  --  --  --            Admitting Physician:  Vic Hayes MD  PCP: Alvarado Lemus MD    Discharging Nurse:   [address] Unit/Room#: 0221/0221-02  Discharging Unit Phone Number: [phone]    Emergency Contact:   Extended Emergency Contact Information  Primary Emergency Contact: JayeshAnival hartman  Address: [address] [place] [address], [address] [place] [address] Phone: [phone]  Mobile Phone: [phone]  Relation: Spouse  Secondary Emergency Contact: AraceliAntonietta   [address] Phone: [phone]  Relation: Child    Past Surgical History:  Past Surgical History:   Procedure Laterality Date    BACK SURGERY      BREAST SURGERY      CATARACT REMOVAL      CHOLECYSTECTOMY      DIAGNOSTIC CARDIAC CATH LAB PROCEDURE      DILATION AND CURETTAGE OF UTERUS      EYE SURGERY      HYSTERECTOMY      JOINT REPLACEMENT Right 2021    Right total knee replacement    KNEE SURGERY      ROTATOR CUFF REPAIR      TOTAL KNEE ARTHROPLASTY Right 2021    RIGHT TOTAL KNEE REPLACEMENT performed by Vic Hayes MD at [address] [place] [address] SURGERY         Immunization History:   Immunization History   Administered Date(s) Administered    Influenza Virus Vaccine 10/01/2013       Active Problems:  Patient Active Problem List   Diagnosis Code    CARRINGTON (obstructive sleep apnea) G47.33    HTN (hypertension) I10    Obesity E66.9    Displacement of lumbar intervertebral disc without myelopathy M51.26    Degeneration of lumbar or lumbosacral intervertebral disc M51.37    Spinal stenosis, lumbar region, without neurogenic claudication M48.061    Lumbosacral spondylosis without myelopathy M47.817    Primary osteoarthritis of right knee M17.11    Osteoarthritis of right knee M17.11    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    Hyperthyroidism E05.90       Isolation/Infection:   Isolation            No Isolation          Patient Infection Status       Infection Onset Added Last Indicated Last Indicated By Review Planned Expiration Resolved Resolved By    None active    Resolved    COVID-19 Rule Out 05/11/21 05/11/21 05/11/21 COVID-19 (Ordered)   05/11/21 Rule-Out Test Resulted            Nurse Assessment:  Last Vital Signs: /64   Pulse 67   Temp 97.9 °F (36.6 °C) (Oral)   Resp 16   Ht 5' 3\" (1.6 m)   Wt 170 lb (77.1 kg)   SpO2 93%   BMI 30.11 kg/m²     Last documented pain score (0-10 scale): Pain Level: 1  Last Weight:   Wt Readings from Last 1 Encounters:   05/17/21 170 lb (77.1 kg)     Mental Status:  oriented and alert    IV Access:  - None    Nursing Mobility/ADLs:  Walking   Assisted  Transfer  Assisted  Bathing  Assisted  Dressing  Assisted  Toileting  Assisted  Feeding  Assisted  Med Admin  Assisted  Med Delivery   whole    Wound Care Documentation and Therapy:        Elimination:  Continence:   · Bowel: Yes  · Bladder: Yes  Urinary Catheter: None   Colostomy/Ileostomy/Ileal Conduit: No       Date of Last BM: 5/19/2021  No intake or output data in the 24 hours ending 05/18/21 1107  I/O last 3 completed shifts:   In: 800 [I.V.:800]  Out: -     Safety Concerns: At Risk for Falls    Impairments/Disabilities:      None    Nutrition Therapy:  Current Nutrition Therapy:   - Oral Diet:  General    Routes of Feeding: Oral  Liquids: No Restrictions  Daily Fluid Restriction: no  Last Modified Barium Swallow with Video (Video Swallowing Test): not done    Treatments at the Time of Hospital Discharge:   Respiratory Treatments:   Oxygen Therapy:  is not on home oxygen therapy.   Ventilator:    - No ventilator support    Rehab Therapies: Physical Therapy and Occupational Therapy  Weight Bearing Status/Restrictions: No weight bearing restirctions  Other Medical Equipment (for information only, NOT a DME order):  walker  Other Treatments:     Patient's personal belongings (please select all that are sent with patient):  None    RN SIGNATURE:  Electronically signed by Ronal Lr RN on 5/20/21 at 10:20 AM EDT    CASE MANAGEMENT/SOCIAL WORK SECTION    Inpatient Status Date: 5/17/21    Readmission Risk Assessment Score:  Readmission Risk              Risk of Unplanned Readmission:  7           Discharging to Facility/ Agency   · Name: [facility]  · Address:  · Phone: [phone]  · Fax: [address]: LEVEL 3 SAFETY     Home health agency to establish plan of care for patient over 60 day period   [address] Initial home SN evaluation visit to occur within 24-48 hours for:   medication management   VS and clinical assessment   S&S chronic disease exacerbation education + when to contact MD / NP   care coordination   Medication Reconciliation during 1st SN visit     PT/OT   Evaluations in home within 24-48 hours of discharge to include DME and home safety   Antonio Phillips Frontload therapy 5 days, then 3x a week    OT to evaluate if patient has [address] needs for personal care     PCP Visit scheduled within 3 - 7 days of hospital discharge      Telehealth-Homecare Vitals(If patient is agreeable and meets guidelines)        / signature: Electronically signed by Dorota Chacko RN on 5/20/21 at 10:26 AM EDT    PHYSICIAN SECTION    Prognosis: Good    Condition at Discharge: Stable    Rehab Potential (if transferring to Rehab): Good    Recommended Labs or Other Treatments After Discharge: SN, PT/OT  HCV and Zoom Programs    Physician Certification: I certify the above information and transfer of Traci Maya  is necessary for the continuing treatment of the diagnosis listed and that she requires Home Care for less 30 days. Update Admission H&P: No change in H&P    PHYSICIAN SIGNATURE:  Electronically signed by BRYSON Caputo on 5/18/21 at 11:08 AM EDT        Total Knee Replacement  Discharge Instructions     To prevent Clot formation, you have been placed on the following medication:  o Aspirin 325 mg twice daily for 30 days    Surgical Site Care:  o Dressing change every 5-7 days with Mepilex dressing at home until incision healed  o If you have sutures or staples, they will be removed on post-operative day 10-12 and steri-strips applied  o If you have glue, this will be removed at your appointment or gradually wear off as your incision heals  o Showering is permitted if waterproof Mepilex dressing is applied or when staples are removed and all areas of incision are healed.  Physical Therapy:  o Weight Bearing Status:  - Weight bearing as tolerated  o 3 times per week via [address] [place] [address],[address] and Dangle Protocol  o Patient is seated, ideally edge of bed, but can be in chair with foot flat on floor  o PROM is initiated by patient moving forward, allowing the knee to flex to 90 degrees or limits of tolerance, holding in flexion for 10-20 seconds at a time for a total of 20 minutes  o Goal is to continue holding in flexion for 10-20 seconds at a time and extend to total of 30-45 minutes as pain and swelling allow.     o The procedure is exercised 2-3 times per day  o The knee immobilizer used while ambulating until patient can complete SLR  o Precautions  - Per Physical Therapy Handout   Pain Medications  o You can continue to take tylenol for pain control  o Wean off pain medications as you deem appropriate as long as pain is under control  o Be sure to drink plenty of fluids (recommend water) while taking narcotic pain medications to prevent constipation  o  You may take an over the counter laxative or stool softener as needed to prevent/treat constipation as well, we recommend Senokot S OTC. We recommend that you consider taking these medications the entire time you are taking pain medication.  Cold packs/Ice packs/Machine  o May be used as much as necessary to control swelling/inflammation/soreness  o Be sure to have a barrier (cloth, clothing, towel) between the site and the ice pack to prevent frostbite   Contact [facility]'s office if  o Increased redness, swelling, drainage of any kind, and/or pain to surgery site. As well as new onset fevers and or chills. These could signify an infection. o Calf or thigh tenderness to touch as well as increased swelling or redness. This could signify a clot formation. o Numbness or tingling to an area around the incision site or below the incision site (toes). o Any rash appears, increased  or new onset nausea/vomiting occur. This may indicate a reaction to a medication. [employer] Phone # [phone]. [facility].  Follow up with Surgeon at scheduled appointment time.  I acknowledge that I have received stephie hose and understand the instructions on how and when to wear them   __________________________________   Discharging RN who has gone over instructions and acknowledges stephie hose have been received   ____________________________________________   [employer] Please continue to use your Incentive Spirometer every hour while awake.

## 2021-05-18 NOTE — CARE COORDINATION
Case Management Assessment  Initial Evaluation      Patient Name: Siva Izquierdo  YOB: 1935  Diagnosis: Osteoarthritis of right knee, unspecified osteoarthritis type [M17.11]  Date / Time: 5/17/2021  6:06 AM    Admission status/Date:INPT 5/17/21  Chart Reviewed: Yes      Patient Interviewed: Yes   Family Interviewed:  No      Hospitalization in the last 30 days:  No      Health Care Decision Maker :  Heidi Fuse: [phone]   (CM - must 1st enter selection under Navigator - emergency contact- Health Care Decision Maker Relationship and pick relationship)   Who do you trust or have selected to make healthcare decisions for you      Met with: pt at bedside  Interview conducted  (bedside/phone):    Current PCP: [facility]    Financial  Medicare  Precert required for SNF : Y, N          3 night stay required - Y, N    ADLS  Support Systems/Care Needs: Spouse/Significant Other, Children  Transportation: family    Meal Preparation: spouse    Housing  Living Arrangements: home with spouse  Steps: 1  Intent for return to present living arrangements: Yes  Identified Issues: post-op    Home Care Information  Active with Home Health Care : No Agency:(Services)  Type of Home Care Services: None  Passport/Waiver : No  :                      Phone Number:    Passport/Waiver Services: -          Durable Medical Equiptment   DME Provider: n/a  Equipment:   Walker_X__Cane_X__RTS___ BSC___Shower Chair___Hospital Bed___W/C____Other_rollator_______  02 at ____Liter(s)---wears(frequency)_______ HHN ___ CPAP___ BiPap___   N/A____      Home O2 Use :  No    If No for home O2---if presently on O2 during hospitalization:  No  if yes CM to follow for potential DC O2 need  Informed of need for care provider to bring portable home O2 tank on day of discharge for nursing to connect prior to leaving:   Not Indicated  Verbalized agreement/Understanding:   Not Indicated    Community Service Affiliation  Dialysis:  No    · Agency:  · Location:  · Dialysis Schedule:  · Phone:   · Fax: Other Community Services: (ex:PT/OT,Mental Health,Wound Clinic, Cardio/Pul [address])    DISCHARGE PLAN: Explained Case Management role/services. Reviewed chart and met with pt. Pt reports to live at home with her spouse and plans to return home at discharge. Pt states that she would like [facility] to follow for hhc. Referral called to Rona Reyes with [facility] for SN, PT, OT. Rona Reyes states they will follow with pt.

## 2021-05-18 NOTE — PROGRESS NOTES
Pt awake at this time. Scheduled Tylenol given pain 3/10. Pt denies any needs. Call light within reach and bed alarm on.

## 2021-05-18 NOTE — CARE COORDINATION
[facility]  Received referral regarding HC from Morena ARIAS. [facility] to [facility] for Ortho SOC coverage 5/19 if discharged today. [facility] can see pt 5/19.    11:00 Noted pt will be a probable discharge home 5/19 needing [facility] 5/20. Updated [facility] team.    Spoke with [address].      Electronically signed by Gil Colindres RN on 5/19/2021 at 8:03 AM

## 2021-05-18 NOTE — FLOWSHEET NOTE
05/17/21 2045   Vital Signs   Temp 97.7 °F (36.5 °C)   Temp Source Oral   Pulse 64   Heart Rate Source Monitor   Resp 16   /62   BP Location Left upper arm   Patient Position Semi fowlers   Level of Consciousness Alert (0)   MEWS Score 1   Oxygen Therapy   SpO2 96 %   O2 Device None (Room air)   Pt A/O x 4 assessment completed. Ace wrap noted to right leg. Meds given per MAR. Pt denies any pain or needs. JUJU hose removed from left leg and SCD in place. Call light within reach and bed alarm on.

## 2021-05-18 NOTE — ACP (ADVANCE CARE PLANNING)
Advance Care Planning   Healthcare Decision Maker: Allison acharya: [phone]      Click here to complete Healthcare Decision Makers including selection of the Healthcare Decision Maker Relationship (ie \"Primary\").

## 2021-05-18 NOTE — CONSULTS
Jarad Manzanares;  MRN: [id number] ;   Admit Date: 5/17/2021  6:06 AM   Current Date and Time: 5/18/2021 10:40 AM    PCP  --  Virginia Campoverde MD         Reason for Consultation - Medical Management during hospitalisation     HPI:   Patient is a 81 y.o. female  presents for elective right total knee replacement  arthoplasty by   yesterday. Underwent surgery uneventfully and was transferred to floor for further post operative management. Pt mentions to me that  pain is controlled well  and worsens with leg movement. Denies any chestpain, sob or dizziness. No n/v/d      .       Past Medical History:   Diagnosis Date    Allergic rhinitis     Anxiety     Arthritis     Asthma     Back pain     GERD (gastroesophageal reflux disease)     Goiter     Hyperlipidemia     Hypertension     MRSA (methicillin resistant staph aureus) culture positive     CARRINGTON (obstructive sleep apnea)     Rash     non-cancerous moles removed    Thyroid disease       Past Surgical History:   Procedure Laterality Date    BACK SURGERY      BREAST SURGERY      CATARACT REMOVAL      CHOLECYSTECTOMY      DIAGNOSTIC CARDIAC CATH LAB PROCEDURE      DILATION AND CURETTAGE OF UTERUS      EYE SURGERY      HYSTERECTOMY      JOINT REPLACEMENT Right 05/17/2021    Right total knee replacement    KNEE SURGERY      ROTATOR CUFF REPAIR      TOTAL KNEE ARTHROPLASTY Right 5/17/2021    RIGHT TOTAL KNEE REPLACEMENT performed by Izaiah Swanson MD at [id number]        Medications Prior to Admission: vitamin E 400 UNIT capsule, Take 400 Units by mouth daily  Probiotic Product (PROBIOTIC-10 PO), Take 1 capsule by mouth daily  vitamin D (CHOLECALCIFEROL) 25 MCG (1000 UT) TABS tablet, Take 2,000 Units by mouth daily  DULoxetine (CYMBALTA) 60 MG extended release capsule, Take 60 mg by mouth daily   meloxicam (MOBIC) 15 MG tablet, Take 15 mg by mouth daily   methIMAzole (TAPAZOLE) 5 MG tablet, Take 5 mg by mouth daily Take 1/2 tablet daily  gabapentin (NEURONTIN) 300 MG capsule, Take 100 mg by mouth 3 times daily. omeprazole (PRILOSEC) 20 MG capsule, Take 20 mg by mouth daily. metoprolol (TOPROL-XL) 50 MG XL tablet, Take 50 mg by mouth daily. hydrochlorothiazide (HYDRODIURIL) 25 MG tablet, Take 25 mg by mouth daily. pravastatin (PRAVACHOL) 20 MG tablet, Take 20 mg by mouth daily. mupirocin (BACTROBAN) 2 % ointment, APPLY 1/2 INCH TO INSIDE OF EACH NOSTRIL Q 12 HR STARTING 5 DAYS PRIOR TO SURGERY INCLUDING MORNING OF SURGERY. No Known Allergies   Social History     Tobacco Use    Smoking status: Never Smoker    Smokeless tobacco: Never Used   Substance Use Topics    Alcohol use: Never      Family History   Problem Relation Age of Onset    Cancer Sister         breast    Cancer Sister         lung    Diabetes Mother     Heart Failure Father     Diabetes Brother     Emphysema Brother     Heart Failure Brother     Asthma Neg Hx     Hypertension Neg Hx         Objective:     VS: Temp: 97.3 °F (36.3 °C)  Pulse: 68  BP: 135/68  SpO2: 97 %  O2 Flow Rate (L/min): 2 L/min        Physical Exam:  General:  Awake, alert and oriented. Appears to be not in any distress  Mucous Membranes:  Pink , anicteric  Neck: No JVD, no carotid bruit, no thyromegaly  Chest:  Clear to auscultation bilaterally, no added sounds  Cardiovascular:  RRR S1S2 heard, 2/6 systolic murmur. Abdomen:  Soft, undistended, non tender, no organomegaly, BS present  Extremities: s/p right total knee replacement , expected edema,   no cyanosis.  Distal pulses well felt  Neurological : no focal deficits        LABS:  CBC:  Lab Results   Component Value Date    WBC 10.6 05/18/2021    HGB 10.4 05/18/2021    HCT 31.0 05/18/2021    MCV 91.8 05/18/2021     05/18/2021    NEUTOPHILPCT 75.1 05/18/2021    LYMPHOPCT 14.1 05/18/2021    MONOPCT 9.6 05/18/2021    BASOPCT 0.5 05/18/2021    NEUTROABS 8.0 05/18/2021    LYMPHSABS 1.5 05/18/2021    MONOSABS 1.0 05/18/2021    EOSABS 0.1 05/18/2021    BASOSABS 0.1 05/18/2021     BMP:   Lab Results   Component Value Date     04/26/2021    K 4.8 04/26/2021    CO2 26 04/26/2021    BUN 26 04/26/2021    CREATININE 1.2 04/26/2021    CALCIUM 9.2 04/26/2021     Coagulation:   Lab Results   Component Value Date    INR 0.97 04/26/2021    APTT 32.6 04/26/2021           ACTIVE PROBLEM LIST:     Principal Problem:    Status post total right knee replacement  Active Problems:    Osteoarthritis of right knee  Resolved Problems:    * No resolved hospital problems. *       PLAN:     1. Continue post op management, pain medication to prevent drowsiness. 2. HTN. Stable. Hold hydrochlorothiazide for now as blood pressure is borderline low. Stop IVf   3 HLD. continue statin  4. PT/OT , incentive spirometry to prevent atelectasis   5. Cbc daily  Hyperthyroidism continue methimazole. ASA  for DVT prophylaxis.        Brie Devries MD, MD 5/18/2021 10:40 AM

## 2021-05-18 NOTE — PLAN OF CARE
Problem: Falls - Risk of:  Goal: Will remain free from falls  Description: Will remain free from falls  Outcome: Ongoing     Problem: Discharge Planning:  Goal: Discharged to appropriate level of care  Description: Discharged to appropriate level of care  Outcome: Ongoing     Problem: Mobility - Impaired:  Goal: Mobility will improve  Description: Mobility will improve  5/17/2021 2358 by Kit Mancilla RN  Outcome: Ongoing  5/17/2021 1525 by Manuela Kruse RN  Outcome: Ongoing     Problem: Pain - Acute:  Goal: Pain level will decrease  Description: Pain level will decrease  5/17/2021 2358 by Kit Mancilla RN  Outcome: Ongoing  5/17/2021 1525 by Manuela Kruse RN  Outcome: Ongoing     Problem: Pain:  Goal: Pain level will decrease  Description: Pain level will decrease  5/17/2021 2358 by Kit Mancilla RN  Outcome: Ongoing  5/17/2021 1525 by Manuela Kruse RN  Outcome: Ongoing  Goal: Control of acute pain  Description: Control of acute pain  Outcome: Ongoing

## 2021-05-19 LAB
BASOPHILS ABSOLUTE: 0.1 K/UL (ref 0–0.2)
BASOPHILS RELATIVE PERCENT: 0.5 %
EOSINOPHILS ABSOLUTE: 0.4 K/UL (ref 0–0.6)
EOSINOPHILS RELATIVE PERCENT: 3.2 %
GLUCOSE BLD-MCNC: 130 MG/DL (ref 70–99)
HCT VFR BLD CALC: 28.7 % (ref 36–48)
HEMOGLOBIN: 9.5 G/DL (ref 12–16)
LYMPHOCYTES ABSOLUTE: 1.5 K/UL (ref 1–5.1)
LYMPHOCYTES RELATIVE PERCENT: 11.5 %
MCH RBC QN AUTO: 30.3 PG (ref 26–34)
MCHC RBC AUTO-ENTMCNC: 33 G/DL (ref 31–36)
MCV RBC AUTO: 91.9 FL (ref 80–100)
MONOCYTES ABSOLUTE: 1.2 K/UL (ref 0–1.3)
MONOCYTES RELATIVE PERCENT: 9.2 %
NEUTROPHILS ABSOLUTE: 9.8 K/UL (ref 1.7–7.7)
NEUTROPHILS RELATIVE PERCENT: 75.6 %
PDW BLD-RTO: 14.1 % (ref 12.4–15.4)
PERFORMED ON: ABNORMAL
PLATELET # BLD: 170 K/UL (ref 135–450)
PMV BLD AUTO: 9.5 FL (ref 5–10.5)
RBC # BLD: 3.12 M/UL (ref 4–5.2)
WBC # BLD: 12.9 K/UL (ref 4–11)

## 2021-05-19 PROCEDURE — 97530 THERAPEUTIC ACTIVITIES: CPT

## 2021-05-19 PROCEDURE — 1200000000 HC SEMI PRIVATE

## 2021-05-19 PROCEDURE — 6370000000 HC RX 637 (ALT 250 FOR IP): Performed by: ORTHOPAEDIC SURGERY

## 2021-05-19 PROCEDURE — 99232 SBSQ HOSP IP/OBS MODERATE 35: CPT | Performed by: INTERNAL MEDICINE

## 2021-05-19 PROCEDURE — 85025 COMPLETE CBC W/AUTO DIFF WBC: CPT

## 2021-05-19 PROCEDURE — 2580000003 HC RX 258: Performed by: ORTHOPAEDIC SURGERY

## 2021-05-19 PROCEDURE — 97110 THERAPEUTIC EXERCISES: CPT

## 2021-05-19 PROCEDURE — 36415 COLL VENOUS BLD VENIPUNCTURE: CPT

## 2021-05-19 RX ADMIN — ASPIRIN 325 MG: 325 TABLET, COATED ORAL at 19:59

## 2021-05-19 RX ADMIN — Medication 10 ML: at 19:59

## 2021-05-19 RX ADMIN — METOPROLOL SUCCINATE 50 MG: 50 TABLET, EXTENDED RELEASE ORAL at 09:33

## 2021-05-19 RX ADMIN — DULOXETINE HYDROCHLORIDE 60 MG: 60 CAPSULE, DELAYED RELEASE ORAL at 09:32

## 2021-05-19 RX ADMIN — ASPIRIN 325 MG: 325 TABLET, COATED ORAL at 09:33

## 2021-05-19 RX ADMIN — METHIMAZOLE 5 MG: 5 TABLET ORAL at 09:33

## 2021-05-19 RX ADMIN — OMEPRAZOLE 20 MG: 20 CAPSULE, DELAYED RELEASE ORAL at 09:33

## 2021-05-19 RX ADMIN — STANDARDIZED SENNA CONCENTRATE AND DOCUSATE SODIUM 1 TABLET: 8.6; 5 TABLET ORAL at 09:32

## 2021-05-19 RX ADMIN — ACETAMINOPHEN 650 MG: 325 TABLET ORAL at 04:50

## 2021-05-19 RX ADMIN — PRAVASTATIN SODIUM 20 MG: 20 TABLET ORAL at 09:33

## 2021-05-19 RX ADMIN — ACETAMINOPHEN 650 MG: 325 TABLET ORAL at 23:10

## 2021-05-19 RX ADMIN — ACETAMINOPHEN 650 MG: 325 TABLET ORAL at 12:09

## 2021-05-19 RX ADMIN — ACETAMINOPHEN 650 MG: 325 TABLET ORAL at 16:29

## 2021-05-19 ASSESSMENT — PAIN SCALES - GENERAL
PAINLEVEL_OUTOF10: 2
PAINLEVEL_OUTOF10: 0
PAINLEVEL_OUTOF10: 5

## 2021-05-19 ASSESSMENT — PAIN DESCRIPTION - PROGRESSION: CLINICAL_PROGRESSION: GRADUALLY WORSENING

## 2021-05-19 ASSESSMENT — PAIN DESCRIPTION - FREQUENCY
FREQUENCY: CONTINUOUS
FREQUENCY: CONTINUOUS

## 2021-05-19 ASSESSMENT — PAIN DESCRIPTION - LOCATION: LOCATION: KNEE

## 2021-05-19 ASSESSMENT — PAIN DESCRIPTION - ONSET
ONSET: ON-GOING
ONSET: ON-GOING

## 2021-05-19 ASSESSMENT — PAIN DESCRIPTION - ORIENTATION: ORIENTATION: RIGHT

## 2021-05-19 ASSESSMENT — PAIN DESCRIPTION - PAIN TYPE: TYPE: SURGICAL PAIN

## 2021-05-19 ASSESSMENT — PAIN DESCRIPTION - DESCRIPTORS: DESCRIPTORS: SORE

## 2021-05-19 NOTE — PROGRESS NOTES
Occupational Therapy  Pt had a rapid response called this morning when working with PT. Will attempt pt again as time allows.   Oliver Grimm OTR/L [id number]

## 2021-05-19 NOTE — CARE COORDINATION
INTERDISCIPLINARY PLAN OF CARE CONFERENCE    Date/Time: 5/19/2021 4:23 PM  Completed by: Cathy Brock RN, Case Management      Patient Name:  Yeimi Elizondo  YOB: 1935  Admitting Diagnosis: Osteoarthritis of right knee, unspecified osteoarthritis type [M17.11]     Admit Date/Time:  5/17/2021  6:06 AM    Chart reviewed. Interdisciplinary team contacted or reviewed plan related to patient progress and discharge plans. Disciplines included Case Management, Nursing, and Dietitian. Current Status: Stable  PT/OT recommendation for discharge plan of care: Home 24 hr assist  and Home PT    Expected D/C Disposition:  Home  Confirmed plan with patient  Yes   Met with:patient and spose  Discharge Plan Comments:  Reviewed chart and met with pt and spouse at bedside. Cont plan for home with [facility]. Will cont to follow.     Home O2 in place on admit: No  Pt informed of need to bring portable home O2 tank on day of discharge for nursing to connect prior to leaving:  Not Indicated  Verbalized agreement/Understanding:  Not Indicated

## 2021-05-19 NOTE — PROGRESS NOTES
Department of Orthopedic Surgery  Physician Assistant   Progress Note    Subjective:     Post-Operative Day: 2 Status Post right Total Knee Arthroplasty  Systemic or Specific Complaints: Got dizzy today while up and near syncopal episode in bathroom. Feels okay currently in bed. Objective:     Patient Vitals for the past 24 hrs:   BP Temp Temp src Pulse Resp SpO2   05/19/21 1100 131/67 -- -- 73 18 96 %   05/19/21 0742 (!) 105/53 99.5 °F (37.5 °C) Oral 68 16 95 %   05/19/21 0319 (!) 118/57 97.6 °F (36.4 °C) Oral 68 16 95 %   05/19/21 0028 (!) 96/47 98.2 °F (36.8 °C) Oral 91 16 92 %   05/18/21 1903 99/61 97.4 °F (36.3 °C) Oral 70 16 94 %   05/18/21 1345 (!) 117/42 97 °F (36.1 °C) Oral 63 16 97 %       General: alert, appears stated age, cooperative and no distress   Wound: Wound clean and dry no evidence of infection. Motion: Painful range of Motion   DVT Exam: No evidence of DVT seen on physical exam.       Knee swollen but thigh soft to palpation. Moving foot and ankle. Good distal pulses. Data Review  CBC:   Lab Results   Component Value Date    WBC 12.9 05/19/2021    RBC 3.12 05/19/2021    HGB 9.5 05/19/2021    HCT 28.7 05/19/2021     05/19/2021       Assessment:     Status Post right Total Knee Arthroplasty. Doing well postoperatively. Plan:      1: Continues current post-op course, IM following. Labs reviewed and stable post op. Encourage PO intake. 2:  Continue Deep venous thrombosis prophylaxis-  mg BID  3:  Continue physical therapy and OT, WBAT  4:  Continue Pain Control PRN, pt only taking tylenol at this time.     5:  D/c planning for home with [facility] when stable     Paolo Garcia PA-C

## 2021-05-19 NOTE — PROGRESS NOTES
Admit: 2021    Name:  Martínez Silva  Room:  022-02  MRN:    [id number]    Daily Progress Note for 2021     Interval History:     Doing well. Scheduled Meds:   DULoxetine  60 mg Oral Daily    [Held by provider] hydroCHLOROthiazide  25 mg Oral Daily    methIMAzole  5 mg Oral Daily    metoprolol succinate  50 mg Oral Daily    omeprazole  20 mg Oral Daily    pravastatin  20 mg Oral Daily    sodium chloride flush  5-40 mL Intravenous 2 times per day    acetaminophen  650 mg Oral Q6H    sennosides-docusate sodium  1 tablet Oral BID    aspirin  325 mg Oral BID       Continuous Infusions:   sodium chloride         PRN Meds:  sodium chloride flush, sodium chloride, magnesium hydroxide, HYDROmorphone **OR** HYDROmorphone, oxyCODONE **OR** oxyCODONE, ondansetron                  Objective:     Temp  Av.9 °F (36.6 °C)  Min: 97 °F (36.1 °C)  Max: 99.5 °F (37.5 °C)  Pulse  Av  Min: 63  Max: 91  BP  Min: 96/47  Max: 118/57  SpO2  Av.6 %  Min: 92 %  Max: 97 %  Patient Vitals for the past 4 hrs:   BP Temp Temp src Pulse Resp SpO2   21 0742 (!) 105/53 99.5 °F (37.5 °C) Oral 68 16 95 %         Intake/Output Summary (Last 24 hours) at 2021 1027  Last data filed at 2021 2016  Gross per 24 hour   Intake 660 ml   Output --   Net 660 ml       Physical Exam:  General:  Awake, alert and oriented. Appears to be not in any distress  Mucous Membranes:  Pink , anicteric  Neck: No JVD, no carotid bruit, no thyromegaly  Chest:  Clear to auscultation bilaterally, no added sounds  Cardiovascular:  RRR S1S2 heard, 2/6 systolic murmur. Abdomen:  Soft, undistended, non tender, no organomegaly, BS present  Extremities: s/p right total knee replacement , expected edema,   no cyanosis.  Distal pulses well felt  Neurological : no focal deficits  Lab Data:  CBC:   Recent Labs     21  0536 21  0527   WBC 10.6 12.9*   RBC 3.38* 3.12*   HGB 10.4* 9.5*   HCT 31.0* 28.7*   MCV 91.8 91.9 RDW 14.0 14.1    170     BMP: No results for input(s): NA, K, CL, CO2, PHOS, BUN, CREATININE in the last 72 hours. Invalid input(s): CA  BNP: No results for input(s): BNP in the last 72 hours. PT/INR: No results for input(s): PROTIME, INR in the last 72 hours. APTT:No results for input(s): APTT in the last 72 hours. CARDIAC ENZYMES: No results for input(s): CKMB, CKMBINDEX, TROPONINI in the last 72 hours. Invalid input(s): CKTOTAL;3  FASTING LIPID PANEL:No results found for: CHOL, HDL, TRIG  LIVER PROFILE: No results for input(s): AST, ALT, ALB, BILIDIR, BILITOT, ALKPHOS in the last 72 hours. Assessment & Plan:     Patient Active Problem List    Diagnosis Date Noted    Status post total right knee replacement 05/18/2021    Hyperthyroidism     Osteoarthritis of right knee 05/17/2021    Primary osteoarthritis of right knee 04/02/2021    Lumbosacral spondylosis without myelopathy 04/07/2015    Displacement of lumbar intervertebral disc without myelopathy 04/01/2015    Degeneration of lumbar or lumbosacral intervertebral disc 04/01/2015    Spinal stenosis, lumbar region, without neurogenic claudication 04/01/2015    CARRINGTON (obstructive sleep apnea) 11/08/2013    HTN (hypertension) 11/08/2013    Obesity 11/08/2013     1. Continue post op management, pain medication to prevent drowsiness. 2. HTN. Stable. Hold hydrochlorothiazide for now as blood pressure is borderline low. Stop IVf. No HCTZ on discharge. D/W patient and family member   3 HLD. continue statin  4. PT/OT , incentive spirometry to prevent atelectasis   Hyperthyroidism continue methimazole.     ASA  for DVT prophylaxis.        Yaneli Rg MD

## 2021-05-19 NOTE — PROGRESS NOTES
Inpatient Physical Therapy Daily Treatment Note    Unit: [facility]  Date:  5/19/2021  Patient Name:    Shella Skiff  Admitting diagnosis:  Osteoarthritis of right knee, unspecified osteoarthritis type [M17.11]  Admit Date:  5/17/2021  Precautions/Restrictions:  Fall risk, Bed/chair alarm and WB Restrictions (FWB RLE)   Knee immobilizer discontinued. Discharge Recommendations: Home 24 hr assist  and Home PT  DME needs for discharge: Needs Met       Therapy recommendation for EMS Transport: can transport by wheelchair    Therapy recommendations for staff:   Assist of 1 with use of rolling walker (RW) and gait belt for all transfers and ambulation to/from [facility]  to/from chair  to/from bathroom    History of Present Illness:   Elective R TKA 5/17    Home Health S4 Level Recommendation: Level 3 Safety  AM-PAC Mobility Score      AM-PAC Inpatient Mobility without Stair Climbing Raw Score : 15    Treatment Time: 6651-9820  Treatment number: 2  Timed Code Treatment Minutes: 45 minutes  Total Treatment Minutes:  45 minutes    Cognition    A&O orientation not directly assessed. Able to follow 2 step commands    Subjective  Patient lying supine in bed with family at bedside ()  Pt agreeable to this PT tx. Pain   No  Location: R knee   Rating:    NA/10  Pain Medicine Status: No request made    Bed Mobility   Supine to Sit:    SBA With HOB flat and no use of bedrails, and additional time to complete  Sit to Supine:   SBA with HOB flat and no use of bedrails. Rolling:   Not Tested  Scooting:   Supervision to EOB in sitting and lateral scooting at EOB. Transfer Training     Sit to stand:   Min A  - from EOB pt initially with difficulty standing x2 attempts due to reduced WB tolerance on operative leg. With [facility] raised 2\" pt required Min A to stand.    Stand to sit:   Not Tested   Bed to Chair:   Not Tested with use of N/A     Gait Training gait completed as indicated below  Distance:      20 ft (EOB to bathroom)  Deviations (firm surface/linoleum):  decreased guillaume, forward flexed posture, decreased step length bilaterally and step-to pattern  Assistive Device Used:    rolling walker (RW)  Level of Assist:    CGA  Comment: Occasional cues for keeping the walker close to the body. Stair Training deferred, pt trialed curb step yesterday. Able to complete without cuing at [facility]. # of Steps:   N/A   Level of Assist:  N/A  UE Support:  NA  Assistive Device:  N/A  Pattern:   N/A  Comments: N/A    Therapeutic Exercise all completed bilaterally unless indicated  Ankle Pumps x 10 reps  Glut sets x 10 reps  Quad sets x 10 reps  Heel slides x 10 reps  LAQ x 10 reps  SLR x 10 reps  Hip abduction: x 10 reps    Balance  Sitting:  Good ; Independent  Comments: at EOB    Standing: Fair +; CGA  Comments: with RW. Flexed posture. Patient Education      Role of PT, POC, Discharge recommendations, safety awareness, transfer techniques, pacing activity and HEP. Reviewed HEP instructions to complete 3x daily at home. Reviewed positioning to address ROM. Pt and pt's  concerned about swelling in the knee. Discussed techniques to manage swelling at home. Positioning Needs       Patient left seated in chair in care of RN staff at end of session. ROM Measurements   Knee Flexion: AROM to 50* in supine, PROM to 54* in supine; PROM to 75* in sitting. Knee Extension: AROM to lacking -14* in supine    Activity Tolerance   Seated at EOB: GJ=190/69, HR=73 . Pt asymptomatic. Upon ambulating to bathroom, pt experienced near-syncopal episode while turning to the commode. Therapist lowered pt onto a chair provided by pt's . Pt became more alert within ~15 seconds and care of pt was transitioned to RN staff. Other  None. Assessment :  Patient with near-syncopal episode ambulating to bathroom. She completed bed level exercises with minor cuing and got up to EOB without issue. BP stable at that time.  She required additional assist getting up to standing today (Min A from raised bed) and ambulated with the RW with more cautious gait today vs yesterday. Will continue to monitor BP and symptoms closely during OOB activities while progressing towards all therapy goals. Recommending Home 24 hr assist and with home PT upon discharge as patient functioning below baseline level and would benefit from continued skilled PT. Goals (all goals ongoing unless otherwise indicated)  To be met in 3 visits:  1). Independent with LE Ex x 10 reps - 5/19 goal met, pt able to use written HEP to complete activities indep.     To be met in 6 visits:  1). Supine to/from sit: Supervision   2). Sit to/from stand: Supervision  3). Bed to chair: Supervision  4). Gait: Ambulate 50 ft.  with  Supervision and use of rolling walker (RW)  5). Tolerate B LE exercises 3 sets of 10-15 reps  6). Ascend/descend 1 steps with SBA with use of no handrail and rolling walker (RW)    Plan   Continue with plan of care. Signature: Amandeep Quezada, PT, DPT    If patient discharges from this facility prior to next visit, this note will serve as the Discharge Summary.

## 2021-05-20 VITALS
HEART RATE: 74 BPM | BODY MASS INDEX: 30.12 KG/M2 | RESPIRATION RATE: 16 BRPM | HEIGHT: 63 IN | TEMPERATURE: 98 F | OXYGEN SATURATION: 97 % | SYSTOLIC BLOOD PRESSURE: 121 MMHG | DIASTOLIC BLOOD PRESSURE: 65 MMHG | WEIGHT: 170 LBS

## 2021-05-20 LAB
ANION GAP SERPL CALCULATED.3IONS-SCNC: 10 MMOL/L (ref 3–16)
BASOPHILS ABSOLUTE: 0.1 K/UL (ref 0–0.2)
BASOPHILS RELATIVE PERCENT: 0.6 %
BUN BLDV-MCNC: 16 MG/DL (ref 7–20)
CALCIUM SERPL-MCNC: 8.2 MG/DL (ref 8.3–10.6)
CHLORIDE BLD-SCNC: 98 MMOL/L (ref 99–110)
CO2: 29 MMOL/L (ref 21–32)
CREAT SERPL-MCNC: 0.8 MG/DL (ref 0.6–1.2)
EKG ATRIAL RATE: 76 BPM
EKG DIAGNOSIS: NORMAL
EKG P AXIS: 42 DEGREES
EKG P-R INTERVAL: 148 MS
EKG Q-T INTERVAL: 410 MS
EKG QRS DURATION: 96 MS
EKG QTC CALCULATION (BAZETT): 461 MS
EKG R AXIS: 7 DEGREES
EKG T AXIS: 46 DEGREES
EKG VENTRICULAR RATE: 76 BPM
EOSINOPHILS ABSOLUTE: 0.6 K/UL (ref 0–0.6)
EOSINOPHILS RELATIVE PERCENT: 6.3 %
GFR AFRICAN AMERICAN: >60
GFR NON-AFRICAN AMERICAN: >60
GLUCOSE BLD-MCNC: 106 MG/DL (ref 70–99)
HCT VFR BLD CALC: 28 % (ref 36–48)
HEMOGLOBIN: 9.4 G/DL (ref 12–16)
LYMPHOCYTES ABSOLUTE: 1.5 K/UL (ref 1–5.1)
LYMPHOCYTES RELATIVE PERCENT: 14.5 %
MCH RBC QN AUTO: 31 PG (ref 26–34)
MCHC RBC AUTO-ENTMCNC: 33.4 G/DL (ref 31–36)
MCV RBC AUTO: 92.8 FL (ref 80–100)
MONOCYTES ABSOLUTE: 0.8 K/UL (ref 0–1.3)
MONOCYTES RELATIVE PERCENT: 7.6 %
NEUTROPHILS ABSOLUTE: 7.3 K/UL (ref 1.7–7.7)
NEUTROPHILS RELATIVE PERCENT: 71 %
PDW BLD-RTO: 14.1 % (ref 12.4–15.4)
PLATELET # BLD: 196 K/UL (ref 135–450)
PMV BLD AUTO: 9.4 FL (ref 5–10.5)
POTASSIUM SERPL-SCNC: 3.4 MMOL/L (ref 3.5–5.1)
RBC # BLD: 3.01 M/UL (ref 4–5.2)
SODIUM BLD-SCNC: 137 MMOL/L (ref 136–145)
TROPONIN: <0.01 NG/ML
WBC # BLD: 10.3 K/UL (ref 4–11)

## 2021-05-20 PROCEDURE — 2580000003 HC RX 258: Performed by: ORTHOPAEDIC SURGERY

## 2021-05-20 PROCEDURE — 6370000000 HC RX 637 (ALT 250 FOR IP): Performed by: INTERNAL MEDICINE

## 2021-05-20 PROCEDURE — 6370000000 HC RX 637 (ALT 250 FOR IP): Performed by: ORTHOPAEDIC SURGERY

## 2021-05-20 PROCEDURE — 97116 GAIT TRAINING THERAPY: CPT

## 2021-05-20 PROCEDURE — 93005 ELECTROCARDIOGRAM TRACING: CPT | Performed by: INTERNAL MEDICINE

## 2021-05-20 PROCEDURE — 36415 COLL VENOUS BLD VENIPUNCTURE: CPT

## 2021-05-20 PROCEDURE — 84484 ASSAY OF TROPONIN QUANT: CPT

## 2021-05-20 PROCEDURE — 99232 SBSQ HOSP IP/OBS MODERATE 35: CPT | Performed by: INTERNAL MEDICINE

## 2021-05-20 PROCEDURE — 97530 THERAPEUTIC ACTIVITIES: CPT

## 2021-05-20 PROCEDURE — 97110 THERAPEUTIC EXERCISES: CPT

## 2021-05-20 PROCEDURE — 93010 ELECTROCARDIOGRAM REPORT: CPT | Performed by: INTERNAL MEDICINE

## 2021-05-20 PROCEDURE — 80048 BASIC METABOLIC PNL TOTAL CA: CPT

## 2021-05-20 PROCEDURE — 85025 COMPLETE CBC W/AUTO DIFF WBC: CPT

## 2021-05-20 RX ORDER — POTASSIUM CHLORIDE 750 MG/1
10 TABLET, EXTENDED RELEASE ORAL ONCE
Status: COMPLETED | OUTPATIENT
Start: 2021-05-20 | End: 2021-05-20

## 2021-05-20 RX ADMIN — METOPROLOL SUCCINATE 50 MG: 50 TABLET, EXTENDED RELEASE ORAL at 08:31

## 2021-05-20 RX ADMIN — STANDARDIZED SENNA CONCENTRATE AND DOCUSATE SODIUM 1 TABLET: 8.6; 5 TABLET ORAL at 08:31

## 2021-05-20 RX ADMIN — Medication 10 ML: at 08:31

## 2021-05-20 RX ADMIN — ACETAMINOPHEN 650 MG: 325 TABLET ORAL at 11:03

## 2021-05-20 RX ADMIN — METHIMAZOLE 5 MG: 5 TABLET ORAL at 08:31

## 2021-05-20 RX ADMIN — POTASSIUM CHLORIDE 10 MEQ: 750 TABLET, EXTENDED RELEASE ORAL at 11:03

## 2021-05-20 RX ADMIN — PRAVASTATIN SODIUM 20 MG: 20 TABLET ORAL at 08:31

## 2021-05-20 RX ADMIN — DULOXETINE HYDROCHLORIDE 60 MG: 60 CAPSULE, DELAYED RELEASE ORAL at 08:31

## 2021-05-20 RX ADMIN — OMEPRAZOLE 20 MG: 20 CAPSULE, DELAYED RELEASE ORAL at 08:30

## 2021-05-20 RX ADMIN — ASPIRIN 325 MG: 325 TABLET, COATED ORAL at 08:31

## 2021-05-20 RX ADMIN — ACETAMINOPHEN 650 MG: 325 TABLET ORAL at 04:53

## 2021-05-20 ASSESSMENT — PAIN SCALES - GENERAL
PAINLEVEL_OUTOF10: 0

## 2021-05-20 ASSESSMENT — PAIN DESCRIPTION - DESCRIPTORS: DESCRIPTORS: SORE

## 2021-05-20 ASSESSMENT — PAIN DESCRIPTION - LOCATION: LOCATION: KNEE

## 2021-05-20 NOTE — PROGRESS NOTES
Department of Orthopedic Surgery  Physician Assistant   Progress Note    Subjective:     Post-Operative Day: 3 Status Post right Total Knee Arthroplasty  Systemic or Specific Complaints:   Feels okay currently in bed. Objective:     Patient Vitals for the past 24 hrs:   BP Temp Temp src Pulse Resp SpO2   05/20/21 0740 -- -- -- 72 -- --   05/20/21 0730 (!) 115/58 97.6 °F (36.4 °C) Oral 72 16 100 %   05/20/21 0444 (!) 115/57 97.8 °F (36.6 °C) Oral 75 16 94 %   05/19/21 2340 (!) 91/46 97.8 °F (36.6 °C) Oral 70 16 94 %   05/19/21 1915 (!) 110/54 99.5 °F (37.5 °C) Oral 71 18 95 %   05/19/21 1346 (!) 106/52 97.6 °F (36.4 °C) Oral 76 18 95 %   05/19/21 1100 131/67 -- -- 73 18 96 %       General: alert, appears stated age, cooperative and no distress   Wound: Wound clean and dry no evidence of infection. Motion: Painful range of Motion   DVT Exam: No evidence of DVT seen on physical exam.       Knee swollen but thigh soft to palpation. Moving foot and ankle. Good distal pulses. Data Review  CBC:   Lab Results   Component Value Date    WBC 10.3 05/20/2021    RBC 3.01 05/20/2021    HGB 9.4 05/20/2021    HCT 28.0 05/20/2021     05/20/2021       Assessment:     Status Post right Total Knee Arthroplasty. Doing well postoperatively. Plan:      1: Continues current post-op course, IM following. Labs reviewed and stable post op. Encourage PO intake. 2:  Continue Deep venous thrombosis prophylaxis-  mg BID  3:  Continue physical therapy and OT, WBAT  4:  Continue Pain Control PRN, pt only taking tylenol at this time.     5:  D/c planning for home with [facility] when stable - today if okay with IM team    Claudell Eglin, PA-C

## 2021-05-20 NOTE — CARE COORDINATION
[facility]  Notified [facility] of pt discharge home today. [facility] 5/21. Liaison to fax referral when orders received. Spoke with pt 5/19. Sent referral to [facility] for [facility] 5/21.     Electronically signed by Olga Hooper RN on 5/20/2021 at 9:53 AM

## 2021-05-20 NOTE — PROGRESS NOTES
Inpatient Physical Therapy Daily Treatment Note    Unit: [facility]  Date:  5/20/2021  Patient Name:    Shereen Berumen  Admitting diagnosis:  Osteoarthritis of right knee, unspecified osteoarthritis type [M17.11]  Admit Date:  5/17/2021  Precautions/Restrictions:  Fall risk, Bed/chair alarm and WB Restrictions (FWB RLE)   Knee immobilizer discontinued. Discharge Recommendations: Home 24 hr assist  and Home PT  DME needs for discharge: Needs Met       Therapy recommendation for EMS Transport: can transport by wheelchair    Therapy recommendations for staff:   Assist of 1 with use of rolling walker (RW) and gait belt for all transfers and ambulation to/from [facility]  to/from chair  to/from bathroom    History of Present Illness:   Elective R TKA 5/17    Home Health S4 Level Recommendation: Level 3 Safety  AM-PAC Mobility Score      AM-PAC Inpatient Mobility without Stair Climbing Raw Score : 15    Treatment Time: 3060-0849  Treatment number: 6 (progress note)  Timed Code Treatment Minutes: 49 minutes  Total Treatment Minutes:  49 minutes    Cognition    A&O orientation not directly assessed. Able to follow 2 step commands    Subjective  Patient lying supine in bed with family at bedside ( and dtr)  Pt agreeable to this PT tx. Pain   Yes  Location: R knee at rest; R knee and L knee with mobility. Rating:    mild/10  Pain Medicine Status: No request made    Bed Mobility   Supine to Sit:    Not Tested   Sit to Supine:   Not Tested   Rolling:   Not Tested  Scooting:   Not Tested     Transfer Training   Sit to stand:   Min A for 1st attempt from recliner, then progressing to SBA with cuing for sequencing and hand/foot placement. Mod A from toilet with use of handrail. Stand to sit:   SBA to recliner x 2 attempts. Min A to toilet to control descent to low surface.    Bed to Chair:   Not Tested with use of N/A     Gait Training gait completed as indicated below   Ambulation Trial 1: - Rolling Walker  Distance: BP (mmHg)  HR (bpm)  SpO2 (%)    Reclined before activity   128/56  67     EOB  142/68  77     Returned to supine  127/64 75      Other  None. Assessment :  Pt remains asymptomatic with stable BP during today's treatment. She still has some difficulty with sit>stand transfers and benefits from cuing for technique; assist varies from Mod A from low toilet to SBA from recliner. Pt's family is present and very active in treatment today, asking many clarifying questions. Trialed anna walker to promote improved posture and safety with the walker but pt decides to keep using current walker. Pt will benefit from skilled PT in acute setting and once returning home to promote activity tolerance and independent functional mobility. Recommending Home 24 hr assist and with home PT upon discharge as patient functioning below baseline level and would benefit from continued skilled PT. Goals (all goals ongoing unless otherwise indicated)  To be met in 3 visits:  1). Independent with LE Ex x 10 reps - 5/19 goal met, pt able to use written HEP to complete activities indep.     To be met in 6 visits:  1). Supine to/from sit: Supervision - 5/20 goal not met, SBA  2). Sit to/from stand: Supervision - 5/20 goal not met,  SBA-Mod A (fluctuating)  3). Bed to chair: Supervision - 5/20 goal not met, CGA-SBA  4). Gait: Ambulate 50 ft.  with  Supervision and use of rolling walker (RW)- 5/20 goal not met, CGA-SBA  5). Tolerate B LE exercises 3 sets of 10-15 reps - 5/20 progressing   6). Ascend/descend 1 steps with SBA with use of no handrail and rolling walker (RW) - 5/20 CGA    Plan   Continue with plan of care. Signature: Gautam López, PT, DPT    If patient discharges from this facility prior to next visit, this note will serve as the Discharge Summary.

## 2021-05-20 NOTE — CARE COORDINATION
DISCHARGE ORDER  Date/Time 2021 10:27 AM  Completed by: Andrea Espinosa RN, Case Management    Patient Name: Rigo Nelson      : 1935  Admitting Diagnosis: Osteoarthritis of right knee, unspecified osteoarthritis type [M17.11]      Admit order Date and Status: 21 inpt  (verify MD's last order for status of admission)      Noted discharge order. If applicable PT/OT recommendation at Discharge: Home 24 hr assist  and Home PT  DME recommendation by PT/OT:Needs met  Confirmed discharge plan : Yes  with whom patient  If pt confirmed DC plan does family need to be contacted by CM No  Discharge Plan:  Order for dc noted. Spoke with pt who cont plan to return home with spouse assistancee. Remains agreeable to [place] [id number] with [facility]. Edith aware of dc and will arrange for [place] [phone] needs. Chart reviewed and no other dc needs identified. Reviewed chart. Role of discharge planner explained and patient verbalized understanding. Discharge order is noted. Has Home O2 in place on admit:  No  Informed of need to bring portable home O2 tank on day of discharge for nursing to connect prior to leaving:   Not Indicated  Verbalized agreement/Understanding:   Not Indicated  Pt is being d/c'd to home today. Pt's O2 sats are 100% on  RA    Discharge timeout done with  Nsg, CM and pt. All discharge needs and concerns addressed.

## 2021-05-20 NOTE — PROGRESS NOTES
Dressing to right knee changed per policy. Incision WNL. Pt tolerated dressing change well. D/c instructions given to pt with explanation of new medications. Verbalized understanding of instructions. Pt getting dressed and then will call for wheelchair.

## 2021-05-20 NOTE — PROGRESS NOTES
Pt resting. Resp e/e. Shift assessment completed and charted. No needs. Will monitor.  Mary Munoz RN

## 2021-05-20 NOTE — DISCHARGE SUMMARY
Department of Orthopedic Surgery  Physician Assistant   Discharge Summary    The Jarad Manzanares is a 81 y.o. female underwent total knee replacement procedure without complication. Jarad Manzanares was admitted to the floor following Her recovery in the PACU. Discharge Diagnosis  right Knee Replacement    Current Inpatient Medications    Current Facility-Administered Medications: DULoxetine (CYMBALTA) extended release capsule 60 mg, 60 mg, Oral, Daily  [Held by provider] hydroCHLOROthiazide (HYDRODIURIL) tablet 25 mg, 25 mg, Oral, Daily  methIMAzole (TAPAZOLE) tablet 5 mg, 5 mg, Oral, Daily  metoprolol succinate (TOPROL XL) extended release tablet 50 mg, 50 mg, Oral, Daily  omeprazole (PRILOSEC) delayed release capsule 20 mg, 20 mg, Oral, Daily  pravastatin (PRAVACHOL) tablet 20 mg, 20 mg, Oral, Daily  sodium chloride flush 0.9 % injection 5-40 mL, 5-40 mL, Intravenous, 2 times per day  sodium chloride flush 0.9 % injection 5-40 mL, 5-40 mL, Intravenous, PRN  0.9 % sodium chloride infusion, 25 mL, Intravenous, PRN  acetaminophen (TYLENOL) tablet 650 mg, 650 mg, Oral, Q6H  sennosides-docusate sodium (SENOKOT-S) 8.6-50 MG tablet 1 tablet, 1 tablet, Oral, BID  magnesium hydroxide (MILK OF MAGNESIA) 400 MG/5ML suspension 30 mL, 30 mL, Oral, Daily PRN  HYDROmorphone (DILAUDID) injection 0.25 mg, 0.25 mg, Intravenous, Q3H PRN **OR** HYDROmorphone (DILAUDID) injection 0.5 mg, 0.5 mg, Intravenous, Q3H PRN  oxyCODONE (ROXICODONE) immediate release tablet 5 mg, 5 mg, Oral, Q4H PRN **OR** oxyCODONE (ROXICODONE) immediate release tablet 10 mg, 10 mg, Oral, Q4H PRN  ondansetron (ZOFRAN) injection 4 mg, 4 mg, Intravenous, Q6H PRN  aspirin EC tablet 325 mg, 325 mg, Oral, BID    Post-operatively the patients diet was advanced as tolerated and their incision was checked on POD #1. The incision is dressing in place, clean, dry and intact with no signs of infection.   The patient remained neurovascularly intact in the prescription issued to this patient but a continuation of medication utilized during the hospital admission as noted in the medical record. [place]ARRS report has also been utilized to screen for any abuse history or suspicious activity as outlined in [place]. All efforts have been taken to prevent abuse potential and misuse of opioid medications including education, screening, and close clinical follow up.

## 2021-05-20 NOTE — PROGRESS NOTES
Admit: 2021    Name:  Neeta Maharaj  Room:  022-02  MRN:    [id number]    Daily Progress Note for 2021     Interval History:     Doing well. Had an episode of near syncope yesterday when she walked t othe bathroom with PT     Feeling good today    Scheduled Meds:   DULoxetine  60 mg Oral Daily    [Held by provider] hydroCHLOROthiazide  25 mg Oral Daily    methIMAzole  5 mg Oral Daily    metoprolol succinate  50 mg Oral Daily    omeprazole  20 mg Oral Daily    pravastatin  20 mg Oral Daily    sodium chloride flush  5-40 mL Intravenous 2 times per day    acetaminophen  650 mg Oral Q6H    sennosides-docusate sodium  1 tablet Oral BID    aspirin  325 mg Oral BID       Continuous Infusions:   sodium chloride         PRN Meds:  sodium chloride flush, sodium chloride, magnesium hydroxide, HYDROmorphone **OR** HYDROmorphone, oxyCODONE **OR** oxyCODONE, ondansetron                  Objective:     Temp  Av.1 °F (36.7 °C)  Min: 97.6 °F (36.4 °C)  Max: 99.5 °F (37.5 °C)  Pulse  Av.7  Min: 70  Max: 76  BP  Min: 91/46  Max: 131/67  SpO2  Av.7 %  Min: 94 %  Max: 100 %  Patient Vitals for the past 4 hrs:   BP Temp Temp src Pulse Resp SpO2   21 0740 -- -- -- 72 -- --   21 0730 (!) 115/58 97.6 °F (36.4 °C) Oral 72 16 100 %         Intake/Output Summary (Last 24 hours) at 2021 1021  Last data filed at 2021 1184  Gross per 24 hour   Intake 1140 ml   Output --   Net 1140 ml       Physical Exam:  General:  Awake, alert and oriented. Appears to be not in any distress  Mucous Membranes:  Pink , anicteric  Neck: No JVD, no carotid bruit, no thyromegaly  Chest:  Clear to auscultation bilaterally, no added sounds  Cardiovascular:  RRR S1S2 heard, 2/6 systolic murmur. Abdomen:  Soft, undistended, non tender, no organomegaly, BS present  Extremities: s/p right total knee replacement , expected edema,   no cyanosis.  Distal pulses well felt  Neurological : no focal deficits  Lab Data:  CBC:   Recent Labs     05/18/21  0536 05/19/21  0527 05/20/21  0601   WBC 10.6 12.9* 10.3   RBC 3.38* 3.12* 3.01*   HGB 10.4* 9.5* 9.4*   HCT 31.0* 28.7* 28.0*   MCV 91.8 91.9 92.8   RDW 14.0 14.1 14.1    170 196     BMP: No results for input(s): NA, K, CL, CO2, PHOS, BUN, CREATININE in the last 72 hours. Invalid input(s): CA  BNP: No results for input(s): BNP in the last 72 hours. PT/INR: No results for input(s): PROTIME, INR in the last 72 hours. APTT:No results for input(s): APTT in the last 72 hours. CARDIAC ENZYMES: No results for input(s): CKMB, CKMBINDEX, TROPONINI in the last 72 hours. Invalid input(s): CKTOTAL;3  FASTING LIPID PANEL:No results found for: CHOL, HDL, TRIG  LIVER PROFILE: No results for input(s): AST, ALT, ALB, BILIDIR, BILITOT, ALKPHOS in the last 72 hours. Assessment & Plan:     Patient Active Problem List    Diagnosis Date Noted    Status post total right knee replacement 05/18/2021    Hyperthyroidism     Osteoarthritis of right knee 05/17/2021    Primary osteoarthritis of right knee 04/02/2021    Lumbosacral spondylosis without myelopathy 04/07/2015    Displacement of lumbar intervertebral disc without myelopathy 04/01/2015    Degeneration of lumbar or lumbosacral intervertebral disc 04/01/2015    Spinal stenosis, lumbar region, without neurogenic claudication 04/01/2015    CARRINGTON (obstructive sleep apnea) 11/08/2013    HTN (hypertension) 11/08/2013    Obesity 11/08/2013     1. Continue post op management, pain medication to prevent drowsiness. 2. HTN. Stable. Hold hydrochlorothiazide for now as blood pressure is borderline low. Stop IVf. No HCTZ on discharge. D/W patient and family member   3 HLD. continue statin  4. PT/OT , incentive spirometry to prevent atelectasis   Hyperthyroidism continue methimazole.     Episode of near  syncope yesterday with drop in BP  CBC- H/H stable , BMP normal. Troponin today x1 negative   EKG- sinus with no accute elza   H/o AS - will obtain ECHO from [address] [place] [address].  Needs follow up with her cardiologist   ECHO -moderate AS   D/W patient and         ASA  for DVT prophylaxis.      Ok to dc if she does well today wit hot/pt       Karine Good MD

## 2021-05-21 ENCOUNTER — TELEPHONE (OUTPATIENT)
Dept: ORTHOPEDIC SURGERY | Age: 86
End: 2021-05-21

## 2021-05-21 DIAGNOSIS — Z96.651 STATUS POST TOTAL RIGHT KNEE REPLACEMENT: ICD-10-CM

## 2021-05-21 NOTE — TELEPHONE ENCOUNTER
Spoke with pt, feeling pretty good. Incision status: No drainage or redness    Edema/Swelling/Teds: Doesn't notice any swelling. Pain level and status: Managing pretty well. Feels good. Use of pain medications: Using tylenol only. Blood thinner: ASA- no issues. Bowels: Hasn't started a stool softner. Will take if needed. Home Care Agency active: [facility] active. Outpatient therapy: NA    Do you have all of your medications: Yes    Changes in medications: no    Ortho Vitals: NA    Instructed pt to call Nurse Navigator or surgeon's office with any questions or concerns.      Follow up appointments:    Future Appointments   Date Time Provider Deonte Kemp   6/9/2021  1:10 PM Abram Rubio MD [facility]

## 2021-05-25 ENCOUNTER — TELEPHONE (OUTPATIENT)
Dept: ORTHOPEDIC SURGERY | Age: 86
End: 2021-05-25

## 2021-05-25 DIAGNOSIS — Z96.651 STATUS POST TOTAL RIGHT KNEE REPLACEMENT: ICD-10-CM

## 2021-05-26 NOTE — TELEPHONE ENCOUNTER
Spoke with pt, doing real good with it. Swollen a bit. Now using a cane. Incision status: No drainage or redness    Edema/Swelling/Teds: Having some swelling. Discussed icing and elevating. Pain level and status: Managing well. Use of pain medications: Using tylenol only    Blood thinner: ASA- no issues. Bowels: Moving fine. Home Care Agency active: [phone] [address] is going well. Outpatient therapy: NA    Do you have all of your medications: Yes    Changes in medications: no    Ortho Vitals: NA    Instructed pt to call Nurse Navigator or surgeon's office with any questions or concerns. Signed off from pt. Instructed pt to call RN or Surgeon's office with any issues or concerns.       Follow up appointments:    Future Appointments   Date Time Provider Deonte Kemp   6/9/2021  1:10 PM Eloisa Lange MD [facility]

## 2021-05-27 ENCOUNTER — TELEPHONE (OUTPATIENT)
Dept: ORTHOPEDIC SURGERY | Age: 86
End: 2021-05-27

## 2021-06-09 ENCOUNTER — OFFICE VISIT (OUTPATIENT)
Dept: ORTHOPEDIC SURGERY | Age: 86
End: 2021-06-09

## 2021-06-09 VITALS — WEIGHT: 170 LBS | BODY MASS INDEX: 30.12 KG/M2 | HEIGHT: 63 IN

## 2021-06-09 DIAGNOSIS — Z96.651 S/P REVISION OF TOTAL KNEE, RIGHT: Primary | ICD-10-CM

## 2021-06-09 PROCEDURE — 99024 POSTOP FOLLOW-UP VISIT: CPT | Performed by: ORTHOPAEDIC SURGERY

## 2021-06-29 ENCOUNTER — HOSPITAL ENCOUNTER (OUTPATIENT)
Age: 86
Discharge: HOME | End: 2021-06-29
Payer: MEDICARE

## 2021-06-29 LAB
ALBUMIN SERPL-MCNC: 3.9 G/DL (ref 3.4–5)
ANION GAP SERPL CALCULATED.3IONS-SCNC: 10 MMOL/L (ref 3–16)
APTT: 32.3 SEC (ref 24.2–36.2)
BASOPHILS ABSOLUTE: 0.1 K/UL (ref 0–0.2)
BASOPHILS RELATIVE PERCENT: 0.7 %
BILIRUBIN URINE: NEGATIVE
BLOOD, URINE: NEGATIVE
BUN BLDV-MCNC: 19 MG/DL (ref 7–20)
CALCIUM SERPL-MCNC: 9.2 MG/DL (ref 8.3–10.6)
CHLORIDE BLD-SCNC: 103 MMOL/L (ref 99–110)
CLARITY: CLEAR
CO2: 28 MMOL/L (ref 21–32)
COLOR: YELLOW
CREAT SERPL-MCNC: 1 MG/DL (ref 0.6–1.2)
EKG ATRIAL RATE: 62 BPM
EKG DIAGNOSIS: NORMAL
EKG P AXIS: 51 DEGREES
EKG P-R INTERVAL: 152 MS
EKG Q-T INTERVAL: 406 MS
EKG QRS DURATION: 86 MS
EKG QTC CALCULATION (BAZETT): 412 MS
EKG R AXIS: 32 DEGREES
EKG T AXIS: 48 DEGREES
EKG VENTRICULAR RATE: 62 BPM
EOSINOPHILS ABSOLUTE: 0.3 K/UL (ref 0–0.6)
EOSINOPHILS RELATIVE PERCENT: 3.8 %
GFR AFRICAN AMERICAN: >60
GFR NON-AFRICAN AMERICAN: 53
GLUCOSE BLD-MCNC: 98 MG/DL (ref 70–99)
GLUCOSE URINE: NEGATIVE MG/DL
HCT VFR BLD CALC: 37 % (ref 36–48)
HEMOGLOBIN: 12.2 G/DL (ref 12–16)
INR BLD: 1.06 (ref 0.86–1.14)
KETONES, URINE: ABNORMAL MG/DL
LEUKOCYTE ESTERASE, URINE: NEGATIVE
LYMPHOCYTES ABSOLUTE: 2.1 K/UL (ref 1–5.1)
LYMPHOCYTES RELATIVE PERCENT: 22.9 %
MCH RBC QN AUTO: 30.1 PG (ref 26–34)
MCHC RBC AUTO-ENTMCNC: 33 G/DL (ref 31–36)
MCV RBC AUTO: 91.2 FL (ref 80–100)
MICROSCOPIC EXAMINATION: ABNORMAL
MONOCYTES ABSOLUTE: 0.7 K/UL (ref 0–1.3)
MONOCYTES RELATIVE PERCENT: 7.7 %
NEUTROPHILS ABSOLUTE: 5.9 K/UL (ref 1.7–7.7)
NEUTROPHILS RELATIVE PERCENT: 64.9 %
NITRITE, URINE: NEGATIVE
PDW BLD-RTO: 14.6 % (ref 12.4–15.4)
PH UA: 6 (ref 5–8)
PLATELET # BLD: 370 K/UL (ref 135–450)
PMV BLD AUTO: 8.2 FL (ref 5–10.5)
POTASSIUM SERPL-SCNC: 4.3 MMOL/L (ref 3.5–5.1)
PROTEIN UA: NEGATIVE MG/DL
PROTHROMBIN TIME: 12.3 SEC (ref 10–13.2)
RBC # BLD: 4.06 M/UL (ref 4–5.2)
SODIUM BLD-SCNC: 141 MMOL/L (ref 136–145)
SPECIFIC GRAVITY UA: 1.02 (ref 1–1.03)
TRANSFERRIN: 226 MG/DL (ref 200–360)
URINE TYPE: ABNORMAL
UROBILINOGEN, URINE: 0.2 E.U./DL
WBC # BLD: 9.1 K/UL (ref 4–11)

## 2021-06-29 PROCEDURE — 80048 BASIC METABOLIC PNL TOTAL CA: CPT

## 2021-06-29 PROCEDURE — 84466 ASSAY OF TRANSFERRIN: CPT

## 2021-06-29 PROCEDURE — 85610 PROTHROMBIN TIME: CPT

## 2021-06-29 PROCEDURE — 93005 ELECTROCARDIOGRAM TRACING: CPT | Performed by: ORTHOPAEDIC SURGERY

## 2021-06-29 PROCEDURE — 85025 COMPLETE CBC W/AUTO DIFF WBC: CPT

## 2021-06-29 PROCEDURE — 36415 COLL VENOUS BLD VENIPUNCTURE: CPT

## 2021-06-29 PROCEDURE — 82040 ASSAY OF SERUM ALBUMIN: CPT

## 2021-06-29 PROCEDURE — 87086 URINE CULTURE/COLONY COUNT: CPT

## 2021-06-29 PROCEDURE — 93010 ELECTROCARDIOGRAM REPORT: CPT | Performed by: INTERNAL MEDICINE

## 2021-06-29 PROCEDURE — 81003 URINALYSIS AUTO W/O SCOPE: CPT

## 2021-06-29 PROCEDURE — 85730 THROMBOPLASTIN TIME PARTIAL: CPT

## 2021-06-30 LAB — URINE CULTURE, ROUTINE: NORMAL

## 2021-07-06 ENCOUNTER — TELEPHONE (OUTPATIENT)
Dept: ORTHOPEDIC SURGERY | Age: 86
End: 2021-07-06

## 2021-07-06 DIAGNOSIS — Z96.651 STATUS POST TOTAL RIGHT KNEE REPLACEMENT: ICD-10-CM

## 2021-07-06 NOTE — TELEPHONE ENCOUNTER
COVID: Has had 2nd vaccine      Orthopedic Nurse Navigator Summary  -  Patient Name: Carmen Peterson  Anticipated Date of Surgery:7/19/21  Using [employer]? Yes, Are they Registered: Yes  Attended Pre-Op Education Class: No  If No, why not? PCP:  Phone #:  Date of PCP Visit for H&P: 7/9/21  Any Noted Concerns from PCP prior to surgery: No  If Yes, what concerns?:  Is the Patient in a Pain Management Program?: No  Review of Past Medical History Reveals History of:  -  Critical Lab Values:  - Hemoglobin (g/dL): Date Value 13.3  - Hematocrit (%): Date Value  - HgbA1C : Date Value 0.0  - Albumin : Date Value 4.3  - BUN (mg/dL) : Date Value 26.0  - CREATININE (mg/dL) : Date Value 1.2  - BMI (kg/m2) : Date Value  -  Coronary Artery Disease/HTN/CHF History: Yes  Cardiologist: HTN managed by PCP and meds  Cardiac Clearance Necessary: Yes  Date of Cardiac Clearance Appt: 05/03/2021  On Plavix? No  If YES, when will they stop taking? Final Cardiac Recommendations: On any anticoagulation: No  -  Diabetes History: No  Most Recent HgbA1C: 0.0  PCP or Endocrine Recommendations:  Nutritionist/Dietician Consult Scheduled:  Final Plan For Diabetic Control:  Pulmonary: COPD/Emphysema/ Use of home oxygen: CARRINGTON- Does not use C pap  Alcohol use: Non-Drinker  -  DVT Risk Stratification: Low Risk  Vascular Consult Ordered:  Date of Vascular Appt:  Hematology/Oncology Consult Ordered:  Date of Hematology/Oncology Appt:  Final Recommendation For DVT Prophylaxis:  Smoking history: Non-Smoker  Use of Estrogen:  -  BMI Greater than 40 at time of scheduling?: No  Has Surgeon been notified of BMI concern? No  Weight Loss Clinic Consult Ordered No  Date of Wt Loss Clinic Appt:  BMI at time of surgery (if went through [facility]):  -  Additional Medical Concerns: Additional Recommendations for above concerns:  Attended Pre-Hab Program: No  Anticipated Discharge Disposition: D/C home  Who will be with patient at home following discharge?   Equipment patient already has: walker and cane  Bedroom on first or second floor: First  Bathroom on first or second floor: first  Weight bearing status: full  Pre-op ambulatory status:  Number of entry steps: 2 steps   Caregiver assistance: full time  -No [facility] preference   Keily Coleman  05/10/2021

## 2021-07-09 NOTE — PROGRESS NOTES
PRE OP INSTRUCTION SHEET   1. Do not eat or drink anything after 12 midnight  prior to surgery. This includes no water, chewing gum or mints. 2. Take the following pills will a small sip of water (see MAR)                                        3. Aspirin, Ibuprofen, Advil, Naproxen, Vitamin E, fish oil and other Anti-inflammatory products should be stopped for 5 days before surgery or as directed by your physician. 4. Check with your Doctor regarding stopping Plavix, Coumadin, Lovenox, Fragmin or other blood thinners   5. Do not smoke, and do not drink any alcoholic beverages 24 hours prior to surgery. This includes NA Beer. 6. You may brush your teeth and gargle the morning of surgery. DO NOT SWALLOW WATER   7. You MUST make arrangements for a responsible adult to take you home after your surgery. You will not be allowed to leave alone or drive yourself home. It is strongly suggested someone stay with you the first 24 hrs. Your surgery will be cancelled if you do not have a ride home. 8. A parent/legal guardian must accompany a child scheduled for surgery and plan to stay at the hospital until the child is discharged. Please do not bring other children with you. 9. Please wear simple, loose fitting clothing to the hospital.  Lydnsey Pimentel not bring valuables (money, credit cards, checkbooks, etc.) Do not wear any makeup (including no eye makeup) or nail polish on your fingers or toes. 10. DO NOT wear any jewelry or piercings on day of surgery. All body piercing jewelry must be removed. 11. If you have dentures,glasses, or contacts they will be removed before going to the OR; we will provide you a container. 12. Please see your family doctor/and cardiologist for a history & physical and/or concerning medications. Bring any test results/reports from your physician's office. Have history and labs faxed to [phone].  Remember to bring Blood Bank bracelet on the day of surgery. 14. If you have a Living Will and Durable Power of  for Healthcare, please bring in a copy. 13. Notify your Surgeon if you develop any illness between now and surgery  time, cough, cold, fever, sore throat, nausea, vomiting, etc.  Please notify your surgeon if you experience dizziness, shortness of breath or blurred vision between now & the time of your surgery   16. DO NOT shave your operative site 96 hours prior to surgery. For face & neck surgery, men may use an electric razor 48 hours prior to surgery. 17. Shower with _x__Antibacterial soap (x_chlorhexidine for total joint  Pt's) shower two times before surgery.(the morning of and the night before. 18. To provide excellent care visitors will be limited to one in the room at any given time.   Please call pre admission testing if you any further questions [phone] or [phone]

## 2021-07-12 ENCOUNTER — TELEPHONE (OUTPATIENT)
Dept: ORTHOPEDIC SURGERY | Age: 86
End: 2021-07-12

## 2021-07-12 NOTE — TELEPHONE ENCOUNTER
Auth: NPR  Date: 7/19/2021  Reference # None  Spoke with: None  Type of SX: Inpatient  Location: [facility]  CPT [address]   SX area: LT knee  Insurance: Medicare A&B

## 2021-07-13 ENCOUNTER — ANESTHESIA EVENT (OUTPATIENT)
Dept: OPERATING ROOM | Age: 86
End: 2021-07-13
Payer: MEDICARE

## 2021-07-19 ENCOUNTER — HOSPITAL ENCOUNTER (OUTPATIENT)
Age: 86
Setting detail: SURGERY ADMIT
Discharge: HOME | End: 2021-07-19
Attending: ORTHOPAEDIC SURGERY | Admitting: ORTHOPAEDIC SURGERY
Payer: MEDICARE

## 2021-07-19 ENCOUNTER — ANESTHESIA (OUTPATIENT)
Dept: OPERATING ROOM | Age: 86
End: 2021-07-19
Payer: MEDICARE

## 2021-07-19 VITALS
OXYGEN SATURATION: 98 % | SYSTOLIC BLOOD PRESSURE: 154 MMHG | HEART RATE: 72 BPM | DIASTOLIC BLOOD PRESSURE: 82 MMHG | RESPIRATION RATE: 20 BRPM | WEIGHT: 165 LBS | BODY MASS INDEX: 28.17 KG/M2 | TEMPERATURE: 97.2 F | HEIGHT: 64 IN

## 2021-07-19 PROCEDURE — 2580000003 HC RX 258: Performed by: ANESTHESIOLOGY

## 2021-07-19 RX ORDER — SODIUM CHLORIDE, SODIUM LACTATE, POTASSIUM CHLORIDE, CALCIUM CHLORIDE 600; 310; 30; 20 MG/100ML; MG/100ML; MG/100ML; MG/100ML
INJECTION, SOLUTION INTRAVENOUS CONTINUOUS
Status: DISCONTINUED | OUTPATIENT
Start: 2021-07-19 | End: 2021-07-20 | Stop reason: HOSPADM

## 2021-07-19 RX ORDER — SODIUM CHLORIDE 0.9 % (FLUSH) 0.9 %
10 SYRINGE (ML) INJECTION EVERY 12 HOURS SCHEDULED
Status: DISCONTINUED | OUTPATIENT
Start: 2021-07-19 | End: 2021-07-20 | Stop reason: HOSPADM

## 2021-07-19 RX ORDER — TRANEXAMIC ACID 100 MG/ML
1000 INJECTION, SOLUTION INTRAVENOUS 2 TIMES DAILY PRN
Status: DISCONTINUED | OUTPATIENT
Start: 2021-07-19 | End: 2021-07-20 | Stop reason: HOSPADM

## 2021-07-19 RX ORDER — SODIUM CHLORIDE 9 MG/ML
25 INJECTION, SOLUTION INTRAVENOUS PRN
Status: DISCONTINUED | OUTPATIENT
Start: 2021-07-19 | End: 2021-07-20 | Stop reason: HOSPADM

## 2021-07-19 RX ORDER — SODIUM CHLORIDE 0.9 % (FLUSH) 0.9 %
10 SYRINGE (ML) INJECTION PRN
Status: DISCONTINUED | OUTPATIENT
Start: 2021-07-19 | End: 2021-07-20 | Stop reason: HOSPADM

## 2021-07-19 RX ADMIN — SODIUM CHLORIDE, POTASSIUM CHLORIDE, SODIUM LACTATE AND CALCIUM CHLORIDE: 600; 310; 30; 20 INJECTION, SOLUTION INTRAVENOUS at 07:23

## 2021-07-19 ASSESSMENT — PAIN - FUNCTIONAL ASSESSMENT: PAIN_FUNCTIONAL_ASSESSMENT: 0-10

## 2021-07-19 NOTE — PROGRESS NOTES
Dr. Micky Aguilera here at bedside. Aware of open sore on left leg near knee. He cancelled surgery for today.  updated.

## 2021-07-19 NOTE — H&P
The case was cancellled as she was found to have open sores on the operative leg just below the knee. She has been instructed to reschedule the TKR and come into the office the week before the procedure to check her skin.     Jadon Napier MD

## 2021-07-29 ENCOUNTER — TELEPHONE (OUTPATIENT)
Dept: ORTHOPEDIC SURGERY | Age: 86
End: 2021-07-29

## 2021-08-09 ENCOUNTER — TELEPHONE (OUTPATIENT)
Dept: ORTHOPEDIC SURGERY | Age: 86
End: 2021-08-09

## 2021-08-09 NOTE — TELEPHONE ENCOUNTER
Auth: NPR  Date: 8/16/2021  Reference # None  Spoke with: None  Type of SX: Inpatient  Location: [facility]  CPT [address]   SX area: Lt knee  Insurance: Medicare A&B

## 2021-08-11 ENCOUNTER — OFFICE VISIT (OUTPATIENT)
Dept: ORTHOPEDIC SURGERY | Age: 86
End: 2021-08-11
Payer: MEDICARE

## 2021-08-11 VITALS — HEIGHT: 63 IN | BODY MASS INDEX: 29.23 KG/M2 | WEIGHT: 165 LBS

## 2021-08-11 DIAGNOSIS — M17.12 PRIMARY OSTEOARTHRITIS OF LEFT KNEE: Primary | ICD-10-CM

## 2021-08-11 PROCEDURE — 1123F ACP DISCUSS/DSCN MKR DOCD: CPT | Performed by: ORTHOPAEDIC SURGERY

## 2021-08-11 PROCEDURE — 1036F TOBACCO NON-USER: CPT | Performed by: ORTHOPAEDIC SURGERY

## 2021-08-11 PROCEDURE — 4040F PNEUMOC VAC/ADMIN/RCVD: CPT | Performed by: ORTHOPAEDIC SURGERY

## 2021-08-11 PROCEDURE — G8427 DOCREV CUR MEDS BY ELIG CLIN: HCPCS | Performed by: ORTHOPAEDIC SURGERY

## 2021-08-11 PROCEDURE — 1090F PRES/ABSN URINE INCON ASSESS: CPT | Performed by: ORTHOPAEDIC SURGERY

## 2021-08-11 PROCEDURE — G8417 CALC BMI ABV UP PARAM F/U: HCPCS | Performed by: ORTHOPAEDIC SURGERY

## 2021-08-11 PROCEDURE — 99212 OFFICE O/P EST SF 10 MIN: CPT | Performed by: ORTHOPAEDIC SURGERY

## 2021-08-11 PROCEDURE — G8400 PT W/DXA NO RESULTS DOC: HCPCS | Performed by: ORTHOPAEDIC SURGERY

## 2021-08-11 NOTE — PROGRESS NOTES
PRE OP INSTRUCTION SHEET   1. Do not eat or drink anything after 12 midnight  prior to surgery. This includes no water, chewing gum or mints. 2. Take the following pills will a small sip of water (see MAR)                                        3. Aspirin, Ibuprofen, Advil, Naproxen, Vitamin E, fish oil and other Anti-inflammatory products should be stopped for 5 days before surgery or as directed by your physician. 4. Check with your Doctor regarding stopping Plavix, Coumadin, Lovenox, Fragmin or other blood thinners   5. Do not smoke, and do not drink any alcoholic beverages 24 hours prior to surgery. This includes NA Beer. 6. You may brush your teeth and gargle the morning of surgery. DO NOT SWALLOW WATER   7. You MUST make arrangements for a responsible adult to take you home after your surgery. You will not be allowed to leave alone or drive yourself home. It is strongly suggested someone stay with you the first 24 hrs. Your surgery will be cancelled if you do not have a ride home. 8. A parent/legal guardian must accompany a child scheduled for surgery and plan to stay at the hospital until the child is discharged. Please do not bring other children with you. 9. Please wear simple, loose fitting clothing to the hospital.  Renee Alvarenga not bring valuables (money, credit cards, checkbooks, etc.) Do not wear any makeup (including no eye makeup) or nail polish on your fingers or toes. 10. DO NOT wear any jewelry or piercings on day of surgery. All body piercing jewelry must be removed. 11. If you have dentures,glasses, or contacts they will be removed before going to the OR; we will provide you a container. 12. Please see your family doctor/and cardiologist for a history & physical and/or concerning medications. Bring any test results/reports from your physician's office. Have history and labs faxed to [phone].  Remember to bring Blood Bank bracelet on the day of surgery. 14. If you have a Living Will and Durable Power of  for Healthcare, please bring in a copy. 13. Notify your Surgeon if you develop any illness between now and surgery  time, cough, cold, fever, sore throat, nausea, vomiting, etc.  Please notify your surgeon if you experience dizziness, shortness of breath or blurred vision between now & the time of your surgery   16. DO NOT shave your operative site 96 hours prior to surgery. For face & neck surgery, men may use an electric razor 48 hours prior to surgery. 17. Shower with _x__Antibacterial soap (x_chlorhexidine for total joint  Pt's) shower two times before surgery.(the morning of and the night before. 18. To provide excellent care visitors will be limited to one in the room at any given time.   Please call pre admission testing if you any further questions [phone] or [phone]

## 2021-08-12 ENCOUNTER — TELEPHONE (OUTPATIENT)
Dept: ORTHOPEDIC SURGERY | Age: 86
End: 2021-08-12

## 2021-08-16 ENCOUNTER — APPOINTMENT (OUTPATIENT)
Dept: GENERAL RADIOLOGY | Age: 86
DRG: 470 | End: 2021-08-16
Attending: ORTHOPAEDIC SURGERY
Payer: MEDICARE

## 2021-08-16 ENCOUNTER — HOSPITAL ENCOUNTER (INPATIENT)
Age: 86
LOS: 1 days | Discharge: HOME HEALTH | DRG: 470 | End: 2021-08-17
Attending: ORTHOPAEDIC SURGERY | Admitting: ORTHOPAEDIC SURGERY
Payer: MEDICARE

## 2021-08-16 ENCOUNTER — ANESTHESIA EVENT (OUTPATIENT)
Dept: OPERATING ROOM | Age: 86
DRG: 470 | End: 2021-08-16
Payer: MEDICARE

## 2021-08-16 ENCOUNTER — ANESTHESIA (OUTPATIENT)
Dept: OPERATING ROOM | Age: 86
DRG: 470 | End: 2021-08-16
Payer: MEDICARE

## 2021-08-16 VITALS — DIASTOLIC BLOOD PRESSURE: 56 MMHG | OXYGEN SATURATION: 85 % | SYSTOLIC BLOOD PRESSURE: 116 MMHG

## 2021-08-16 DIAGNOSIS — M17.12 PRIMARY OSTEOARTHRITIS OF LEFT KNEE: Primary | ICD-10-CM

## 2021-08-16 LAB
ABO/RH: NORMAL
ANTIBODY SCREEN: NORMAL

## 2021-08-16 PROCEDURE — 2500000003 HC RX 250 WO HCPCS: Performed by: ANESTHESIOLOGY

## 2021-08-16 PROCEDURE — 6370000000 HC RX 637 (ALT 250 FOR IP): Performed by: ORTHOPAEDIC SURGERY

## 2021-08-16 PROCEDURE — 3600000005 HC SURGERY LEVEL 5 BASE: Performed by: ORTHOPAEDIC SURGERY

## 2021-08-16 PROCEDURE — 6360000002 HC RX W HCPCS: Performed by: ORTHOPAEDIC SURGERY

## 2021-08-16 PROCEDURE — 7100000001 HC PACU RECOVERY - ADDTL 15 MIN: Performed by: ORTHOPAEDIC SURGERY

## 2021-08-16 PROCEDURE — 94761 N-INVAS EAR/PLS OXIMETRY MLT: CPT

## 2021-08-16 PROCEDURE — 3700000000 HC ANESTHESIA ATTENDED CARE: Performed by: ORTHOPAEDIC SURGERY

## 2021-08-16 PROCEDURE — 86901 BLOOD TYPING SEROLOGIC RH(D): CPT

## 2021-08-16 PROCEDURE — C1713 ANCHOR/SCREW BN/BN,TIS/BN: HCPCS | Performed by: ORTHOPAEDIC SURGERY

## 2021-08-16 PROCEDURE — 2580000003 HC RX 258: Performed by: ANESTHESIOLOGY

## 2021-08-16 PROCEDURE — 0SRD0J9 REPLACEMENT OF LEFT KNEE JOINT WITH SYNTHETIC SUBSTITUTE, CEMENTED, OPEN APPROACH: ICD-10-PCS | Performed by: ORTHOPAEDIC SURGERY

## 2021-08-16 PROCEDURE — 86850 RBC ANTIBODY SCREEN: CPT

## 2021-08-16 PROCEDURE — 2580000003 HC RX 258: Performed by: ORTHOPAEDIC SURGERY

## 2021-08-16 PROCEDURE — 6360000002 HC RX W HCPCS

## 2021-08-16 PROCEDURE — 73560 X-RAY EXAM OF KNEE 1 OR 2: CPT

## 2021-08-16 PROCEDURE — C9290 INJ, BUPIVACAINE LIPOSOME: HCPCS | Performed by: ORTHOPAEDIC SURGERY

## 2021-08-16 PROCEDURE — 3700000001 HC ADD 15 MINUTES (ANESTHESIA): Performed by: ORTHOPAEDIC SURGERY

## 2021-08-16 PROCEDURE — L1830 KO IMMOB CANVAS LONG PRE OTS: HCPCS | Performed by: ORTHOPAEDIC SURGERY

## 2021-08-16 PROCEDURE — 2500000003 HC RX 250 WO HCPCS

## 2021-08-16 PROCEDURE — 64447 NJX AA&/STRD FEMORAL NRV IMG: CPT | Performed by: ANESTHESIOLOGY

## 2021-08-16 PROCEDURE — C1776 JOINT DEVICE (IMPLANTABLE): HCPCS | Performed by: ORTHOPAEDIC SURGERY

## 2021-08-16 PROCEDURE — 7100000000 HC PACU RECOVERY - FIRST 15 MIN: Performed by: ORTHOPAEDIC SURGERY

## 2021-08-16 PROCEDURE — 2709999900 HC NON-CHARGEABLE SUPPLY: Performed by: ORTHOPAEDIC SURGERY

## 2021-08-16 PROCEDURE — 36415 COLL VENOUS BLD VENIPUNCTURE: CPT

## 2021-08-16 PROCEDURE — 3600000015 HC SURGERY LEVEL 5 ADDTL 15MIN: Performed by: ORTHOPAEDIC SURGERY

## 2021-08-16 PROCEDURE — 86900 BLOOD TYPING SEROLOGIC ABO: CPT

## 2021-08-16 PROCEDURE — 2500000003 HC RX 250 WO HCPCS: Performed by: ORTHOPAEDIC SURGERY

## 2021-08-16 PROCEDURE — 94150 VITAL CAPACITY TEST: CPT

## 2021-08-16 PROCEDURE — 1200000000 HC SEMI PRIVATE

## 2021-08-16 DEVICE — SIZE 4 TIBIAL TRAY NONPOROUS
Type: IMPLANTABLE DEVICE | Site: KNEE | Status: FUNCTIONAL
Brand: BALANCED KNEE SYSTEM

## 2021-08-16 DEVICE — 32MM PATELLA, VITAMIN E
Type: IMPLANTABLE DEVICE | Site: PATELLA | Status: FUNCTIONAL
Brand: BKS E-VITALIZE

## 2021-08-16 DEVICE — CEMENT BNE 40GM W/ GENT HI VISC RADPQ FOR REV SURG: Type: IMPLANTABLE DEVICE | Site: KNEE | Status: FUNCTIONAL

## 2021-08-16 DEVICE — LT SIZE 4 FEMORAL CR NONPOROUS
Type: IMPLANTABLE DEVICE | Site: KNEE | Status: FUNCTIONAL
Brand: BKS TRIMAX

## 2021-08-16 DEVICE — SIZE 4 7MM TIBIAL INSERT CR
Type: IMPLANTABLE DEVICE | Site: KNEE | Status: FUNCTIONAL
Brand: BKS E-VITALIZE

## 2021-08-16 RX ORDER — MORPHINE SULFATE 10 MG/ML
2 INJECTION, SOLUTION INTRAMUSCULAR; INTRAVENOUS EVERY 5 MIN PRN
Status: DISCONTINUED | OUTPATIENT
Start: 2021-08-16 | End: 2021-08-16 | Stop reason: HOSPADM

## 2021-08-16 RX ORDER — SODIUM CHLORIDE 0.9 % (FLUSH) 0.9 %
5-40 SYRINGE (ML) INJECTION PRN
Status: DISCONTINUED | OUTPATIENT
Start: 2021-08-16 | End: 2021-08-16 | Stop reason: HOSPADM

## 2021-08-16 RX ORDER — TRANEXAMIC ACID 100 MG/ML
1000 INJECTION, SOLUTION INTRAVENOUS 2 TIMES DAILY PRN
Status: DISCONTINUED | OUTPATIENT
Start: 2021-08-16 | End: 2021-08-16 | Stop reason: HOSPADM

## 2021-08-16 RX ORDER — PRAVASTATIN SODIUM 10 MG
20 TABLET ORAL DAILY
Status: DISCONTINUED | OUTPATIENT
Start: 2021-08-16 | End: 2021-08-17 | Stop reason: HOSPADM

## 2021-08-16 RX ORDER — OXYCODONE HYDROCHLORIDE 5 MG/1
5 TABLET ORAL EVERY 4 HOURS PRN
Status: DISCONTINUED | OUTPATIENT
Start: 2021-08-16 | End: 2021-08-17 | Stop reason: HOSPADM

## 2021-08-16 RX ORDER — OMEPRAZOLE 20 MG/1
20 CAPSULE, DELAYED RELEASE ORAL
Status: DISCONTINUED | OUTPATIENT
Start: 2021-08-16 | End: 2021-08-17 | Stop reason: HOSPADM

## 2021-08-16 RX ORDER — SODIUM CHLORIDE 9 MG/ML
25 INJECTION, SOLUTION INTRAVENOUS PRN
Status: DISCONTINUED | OUTPATIENT
Start: 2021-08-16 | End: 2021-08-16 | Stop reason: HOSPADM

## 2021-08-16 RX ORDER — ROCURONIUM BROMIDE 10 MG/ML
INJECTION, SOLUTION INTRAVENOUS PRN
Status: DISCONTINUED | OUTPATIENT
Start: 2021-08-16 | End: 2021-08-16 | Stop reason: SDUPTHER

## 2021-08-16 RX ORDER — METHIMAZOLE 5 MG/1
2.5 TABLET ORAL DAILY
Status: DISCONTINUED | OUTPATIENT
Start: 2021-08-16 | End: 2021-08-17 | Stop reason: HOSPADM

## 2021-08-16 RX ORDER — SUCCINYLCHOLINE CHLORIDE 20 MG/ML
INJECTION INTRAMUSCULAR; INTRAVENOUS PRN
Status: DISCONTINUED | OUTPATIENT
Start: 2021-08-16 | End: 2021-08-16 | Stop reason: SDUPTHER

## 2021-08-16 RX ORDER — PROMETHAZINE HYDROCHLORIDE 25 MG/ML
6.25 INJECTION, SOLUTION INTRAMUSCULAR; INTRAVENOUS
Status: DISCONTINUED | OUTPATIENT
Start: 2021-08-16 | End: 2021-08-16 | Stop reason: HOSPADM

## 2021-08-16 RX ORDER — LABETALOL HYDROCHLORIDE 5 MG/ML
5 INJECTION, SOLUTION INTRAVENOUS EVERY 10 MIN PRN
Status: DISCONTINUED | OUTPATIENT
Start: 2021-08-16 | End: 2021-08-16 | Stop reason: HOSPADM

## 2021-08-16 RX ORDER — DIPHENHYDRAMINE HYDROCHLORIDE 50 MG/ML
12.5 INJECTION INTRAMUSCULAR; INTRAVENOUS
Status: DISCONTINUED | OUTPATIENT
Start: 2021-08-16 | End: 2021-08-16 | Stop reason: HOSPADM

## 2021-08-16 RX ORDER — SODIUM CHLORIDE 0.9 % (FLUSH) 0.9 %
5-40 SYRINGE (ML) INJECTION EVERY 12 HOURS SCHEDULED
Status: DISCONTINUED | OUTPATIENT
Start: 2021-08-16 | End: 2021-08-17 | Stop reason: HOSPADM

## 2021-08-16 RX ORDER — SODIUM CHLORIDE 0.9 % (FLUSH) 0.9 %
5-40 SYRINGE (ML) INJECTION PRN
Status: DISCONTINUED | OUTPATIENT
Start: 2021-08-16 | End: 2021-08-17 | Stop reason: HOSPADM

## 2021-08-16 RX ORDER — SODIUM CHLORIDE 9 MG/ML
INJECTION, SOLUTION INTRAVENOUS CONTINUOUS
Status: DISCONTINUED | OUTPATIENT
Start: 2021-08-16 | End: 2021-08-17 | Stop reason: HOSPADM

## 2021-08-16 RX ORDER — ACETAMINOPHEN 325 MG/1
650 TABLET ORAL EVERY 6 HOURS SCHEDULED
Status: DISCONTINUED | OUTPATIENT
Start: 2021-08-16 | End: 2021-08-17 | Stop reason: HOSPADM

## 2021-08-16 RX ORDER — FENTANYL CITRATE 50 UG/ML
INJECTION, SOLUTION INTRAMUSCULAR; INTRAVENOUS PRN
Status: DISCONTINUED | OUTPATIENT
Start: 2021-08-16 | End: 2021-08-16 | Stop reason: SDUPTHER

## 2021-08-16 RX ORDER — ACETAMINOPHEN 500 MG
500 TABLET ORAL EVERY 6 HOURS PRN
Status: DISCONTINUED | OUTPATIENT
Start: 2021-08-16 | End: 2021-08-17 | Stop reason: HOSPADM

## 2021-08-16 RX ORDER — SODIUM CHLORIDE 9 MG/ML
25 INJECTION, SOLUTION INTRAVENOUS PRN
Status: DISCONTINUED | OUTPATIENT
Start: 2021-08-16 | End: 2021-08-17 | Stop reason: HOSPADM

## 2021-08-16 RX ORDER — OXYCODONE HYDROCHLORIDE 5 MG/1
10 TABLET ORAL EVERY 4 HOURS PRN
Status: DISCONTINUED | OUTPATIENT
Start: 2021-08-16 | End: 2021-08-17 | Stop reason: HOSPADM

## 2021-08-16 RX ORDER — GABAPENTIN 100 MG/1
100 CAPSULE ORAL 3 TIMES DAILY
Status: DISCONTINUED | OUTPATIENT
Start: 2021-08-16 | End: 2021-08-17 | Stop reason: HOSPADM

## 2021-08-16 RX ORDER — BUPIVACAINE HYDROCHLORIDE 2.5 MG/ML
INJECTION, SOLUTION EPIDURAL; INFILTRATION; INTRACAUDAL
Status: COMPLETED | OUTPATIENT
Start: 2021-08-16 | End: 2021-08-16

## 2021-08-16 RX ORDER — DEXAMETHASONE SODIUM PHOSPHATE 4 MG/ML
INJECTION, SOLUTION INTRA-ARTICULAR; INTRALESIONAL; INTRAMUSCULAR; INTRAVENOUS; SOFT TISSUE PRN
Status: DISCONTINUED | OUTPATIENT
Start: 2021-08-16 | End: 2021-08-16 | Stop reason: SDUPTHER

## 2021-08-16 RX ORDER — ONDANSETRON 2 MG/ML
4 INJECTION INTRAMUSCULAR; INTRAVENOUS EVERY 6 HOURS PRN
Status: DISCONTINUED | OUTPATIENT
Start: 2021-08-16 | End: 2021-08-17 | Stop reason: HOSPADM

## 2021-08-16 RX ORDER — HYDROMORPHONE HCL 110MG/55ML
PATIENT CONTROLLED ANALGESIA SYRINGE INTRAVENOUS PRN
Status: DISCONTINUED | OUTPATIENT
Start: 2021-08-16 | End: 2021-08-16 | Stop reason: SDUPTHER

## 2021-08-16 RX ORDER — ONDANSETRON 2 MG/ML
INJECTION INTRAMUSCULAR; INTRAVENOUS PRN
Status: DISCONTINUED | OUTPATIENT
Start: 2021-08-16 | End: 2021-08-16 | Stop reason: SDUPTHER

## 2021-08-16 RX ORDER — MORPHINE SULFATE 10 MG/ML
1 INJECTION, SOLUTION INTRAMUSCULAR; INTRAVENOUS EVERY 5 MIN PRN
Status: DISCONTINUED | OUTPATIENT
Start: 2021-08-16 | End: 2021-08-16 | Stop reason: HOSPADM

## 2021-08-16 RX ORDER — HYDRALAZINE HYDROCHLORIDE 20 MG/ML
5 INJECTION INTRAMUSCULAR; INTRAVENOUS EVERY 10 MIN PRN
Status: DISCONTINUED | OUTPATIENT
Start: 2021-08-16 | End: 2021-08-16 | Stop reason: HOSPADM

## 2021-08-16 RX ORDER — SENNA AND DOCUSATE SODIUM 50; 8.6 MG/1; MG/1
1 TABLET, FILM COATED ORAL 2 TIMES DAILY
Status: DISCONTINUED | OUTPATIENT
Start: 2021-08-16 | End: 2021-08-17 | Stop reason: HOSPADM

## 2021-08-16 RX ORDER — LIDOCAINE HYDROCHLORIDE 20 MG/ML
INJECTION, SOLUTION EPIDURAL; INFILTRATION; INTRACAUDAL; PERINEURAL PRN
Status: DISCONTINUED | OUTPATIENT
Start: 2021-08-16 | End: 2021-08-16 | Stop reason: SDUPTHER

## 2021-08-16 RX ORDER — ONDANSETRON 4 MG/1
4 TABLET, ORALLY DISINTEGRATING ORAL EVERY 8 HOURS PRN
Status: DISCONTINUED | OUTPATIENT
Start: 2021-08-16 | End: 2021-08-17 | Stop reason: HOSPADM

## 2021-08-16 RX ORDER — DULOXETIN HYDROCHLORIDE 60 MG/1
60 CAPSULE, DELAYED RELEASE ORAL DAILY
Status: DISCONTINUED | OUTPATIENT
Start: 2021-08-16 | End: 2021-08-17 | Stop reason: HOSPADM

## 2021-08-16 RX ORDER — LIDOCAINE HYDROCHLORIDE 10 MG/ML
0.3 INJECTION, SOLUTION EPIDURAL; INFILTRATION; INTRACAUDAL; PERINEURAL
Status: COMPLETED | OUTPATIENT
Start: 2021-08-16 | End: 2021-08-16

## 2021-08-16 RX ORDER — ONDANSETRON 2 MG/ML
4 INJECTION INTRAMUSCULAR; INTRAVENOUS PRN
Status: DISCONTINUED | OUTPATIENT
Start: 2021-08-16 | End: 2021-08-16 | Stop reason: HOSPADM

## 2021-08-16 RX ORDER — OXYCODONE HYDROCHLORIDE AND ACETAMINOPHEN 5; 325 MG/1; MG/1
1 TABLET ORAL PRN
Status: DISCONTINUED | OUTPATIENT
Start: 2021-08-16 | End: 2021-08-16 | Stop reason: HOSPADM

## 2021-08-16 RX ORDER — MEPERIDINE HYDROCHLORIDE 25 MG/ML
12.5 INJECTION INTRAMUSCULAR; INTRAVENOUS; SUBCUTANEOUS EVERY 5 MIN PRN
Status: DISCONTINUED | OUTPATIENT
Start: 2021-08-16 | End: 2021-08-16 | Stop reason: HOSPADM

## 2021-08-16 RX ORDER — OXYCODONE HYDROCHLORIDE AND ACETAMINOPHEN 5; 325 MG/1; MG/1
2 TABLET ORAL PRN
Status: DISCONTINUED | OUTPATIENT
Start: 2021-08-16 | End: 2021-08-16 | Stop reason: HOSPADM

## 2021-08-16 RX ORDER — MAGNESIUM HYDROXIDE 1200 MG/15ML
LIQUID ORAL CONTINUOUS PRN
Status: COMPLETED | OUTPATIENT
Start: 2021-08-16 | End: 2021-08-16

## 2021-08-16 RX ORDER — METOPROLOL SUCCINATE 50 MG/1
50 TABLET, EXTENDED RELEASE ORAL DAILY
Status: DISCONTINUED | OUTPATIENT
Start: 2021-08-17 | End: 2021-08-17 | Stop reason: HOSPADM

## 2021-08-16 RX ORDER — SODIUM CHLORIDE, SODIUM LACTATE, POTASSIUM CHLORIDE, CALCIUM CHLORIDE 600; 310; 30; 20 MG/100ML; MG/100ML; MG/100ML; MG/100ML
INJECTION, SOLUTION INTRAVENOUS CONTINUOUS
Status: DISCONTINUED | OUTPATIENT
Start: 2021-08-16 | End: 2021-08-16

## 2021-08-16 RX ORDER — LABETALOL HYDROCHLORIDE 5 MG/ML
INJECTION, SOLUTION INTRAVENOUS PRN
Status: DISCONTINUED | OUTPATIENT
Start: 2021-08-16 | End: 2021-08-16 | Stop reason: SDUPTHER

## 2021-08-16 RX ORDER — SODIUM CHLORIDE 0.9 % (FLUSH) 0.9 %
5-40 SYRINGE (ML) INJECTION EVERY 12 HOURS SCHEDULED
Status: DISCONTINUED | OUTPATIENT
Start: 2021-08-16 | End: 2021-08-16 | Stop reason: HOSPADM

## 2021-08-16 RX ORDER — PROPOFOL 10 MG/ML
INJECTION, EMULSION INTRAVENOUS PRN
Status: DISCONTINUED | OUTPATIENT
Start: 2021-08-16 | End: 2021-08-16 | Stop reason: SDUPTHER

## 2021-08-16 RX ADMIN — ASPIRIN 325 MG: 325 TABLET, COATED ORAL at 20:04

## 2021-08-16 RX ADMIN — FENTANYL CITRATE 25 MCG: 50 INJECTION INTRAMUSCULAR; INTRAVENOUS at 11:14

## 2021-08-16 RX ADMIN — SUCCINYLCHOLINE CHLORIDE 120 MG: 20 INJECTION, SOLUTION INTRAMUSCULAR; INTRAVENOUS at 11:20

## 2021-08-16 RX ADMIN — METHIMAZOLE 2.5 MG: 5 TABLET ORAL at 18:57

## 2021-08-16 RX ADMIN — HYDROMORPHONE HYDROCHLORIDE 0.5 MG: 2 INJECTION, SOLUTION INTRAMUSCULAR; INTRAVENOUS; SUBCUTANEOUS at 11:48

## 2021-08-16 RX ADMIN — LABETALOL HYDROCHLORIDE 10 MG: 5 INJECTION, SOLUTION INTRAVENOUS at 11:39

## 2021-08-16 RX ADMIN — HYDROMORPHONE HYDROCHLORIDE 0.5 MG: 2 INJECTION, SOLUTION INTRAMUSCULAR; INTRAVENOUS; SUBCUTANEOUS at 11:52

## 2021-08-16 RX ADMIN — DULOXETINE HYDROCHLORIDE 60 MG: 60 CAPSULE, DELAYED RELEASE ORAL at 18:57

## 2021-08-16 RX ADMIN — FENTANYL CITRATE 25 MCG: 50 INJECTION INTRAMUSCULAR; INTRAVENOUS at 11:24

## 2021-08-16 RX ADMIN — SODIUM CHLORIDE: 9 INJECTION, SOLUTION INTRAVENOUS at 18:24

## 2021-08-16 RX ADMIN — LIDOCAINE HYDROCHLORIDE 0.3 ML: 10 INJECTION, SOLUTION EPIDURAL; INFILTRATION; INTRACAUDAL; PERINEURAL at 10:26

## 2021-08-16 RX ADMIN — HYDROMORPHONE HYDROCHLORIDE 0.5 MG: 2 INJECTION, SOLUTION INTRAMUSCULAR; INTRAVENOUS; SUBCUTANEOUS at 12:58

## 2021-08-16 RX ADMIN — ROCURONIUM BROMIDE 5 MG: 10 INJECTION, SOLUTION INTRAVENOUS at 11:19

## 2021-08-16 RX ADMIN — ONDANSETRON 4 MG: 2 INJECTION, SOLUTION INTRAMUSCULAR; INTRAVENOUS at 11:14

## 2021-08-16 RX ADMIN — PROPOFOL 80 MG: 10 INJECTION, EMULSION INTRAVENOUS at 11:19

## 2021-08-16 RX ADMIN — SODIUM CHLORIDE, POTASSIUM CHLORIDE, SODIUM LACTATE AND CALCIUM CHLORIDE: 600; 310; 30; 20 INJECTION, SOLUTION INTRAVENOUS at 10:25

## 2021-08-16 RX ADMIN — LABETALOL HYDROCHLORIDE 5 MG: 5 INJECTION, SOLUTION INTRAVENOUS at 11:55

## 2021-08-16 RX ADMIN — HYDROMORPHONE HYDROCHLORIDE 0.5 MG: 2 INJECTION, SOLUTION INTRAMUSCULAR; INTRAVENOUS; SUBCUTANEOUS at 12:53

## 2021-08-16 RX ADMIN — ACETAMINOPHEN 650 MG: 325 TABLET ORAL at 23:26

## 2021-08-16 RX ADMIN — SUGAMMADEX 200 MG: 100 INJECTION, SOLUTION INTRAVENOUS at 12:51

## 2021-08-16 RX ADMIN — BUPIVACAINE HYDROCHLORIDE 30 ML: 2.5 INJECTION, SOLUTION EPIDURAL; INFILTRATION; INTRACAUDAL; PERINEURAL at 11:01

## 2021-08-16 RX ADMIN — VANCOMYCIN HYDROCHLORIDE 1000 MG: 1 INJECTION, POWDER, LYOPHILIZED, FOR SOLUTION INTRAVENOUS at 11:09

## 2021-08-16 RX ADMIN — DOCUSATE SODIUM 50MG AND SENNOSIDES 8.6MG 1 TABLET: 8.6; 5 TABLET, FILM COATED ORAL at 20:04

## 2021-08-16 RX ADMIN — FENTANYL CITRATE 25 MCG: 50 INJECTION INTRAMUSCULAR; INTRAVENOUS at 11:34

## 2021-08-16 RX ADMIN — PRAVASTATIN SODIUM 20 MG: 10 TABLET ORAL at 18:57

## 2021-08-16 RX ADMIN — ACETAMINOPHEN 500 MG: 500 TABLET ORAL at 18:57

## 2021-08-16 RX ADMIN — OMEPRAZOLE 20 MG: 20 CAPSULE, DELAYED RELEASE ORAL at 18:57

## 2021-08-16 RX ADMIN — SODIUM CHLORIDE, POTASSIUM CHLORIDE, SODIUM LACTATE AND CALCIUM CHLORIDE: 600; 310; 30; 20 INJECTION, SOLUTION INTRAVENOUS at 12:53

## 2021-08-16 RX ADMIN — LIDOCAINE HYDROCHLORIDE 100 MG: 20 INJECTION, SOLUTION EPIDURAL; INFILTRATION; INTRACAUDAL; PERINEURAL at 11:14

## 2021-08-16 RX ADMIN — VANCOMYCIN HYDROCHLORIDE 1000 MG: 1 INJECTION, POWDER, LYOPHILIZED, FOR SOLUTION INTRAVENOUS at 23:30

## 2021-08-16 RX ADMIN — DEXAMETHASONE SODIUM PHOSPHATE 4 MG: 4 INJECTION, SOLUTION INTRAMUSCULAR; INTRAVENOUS at 11:23

## 2021-08-16 RX ADMIN — FENTANYL CITRATE 25 MCG: 50 INJECTION INTRAMUSCULAR; INTRAVENOUS at 11:19

## 2021-08-16 RX ADMIN — TRANEXAMIC ACID 1000 MG: 100 INJECTION, SOLUTION INTRAVENOUS at 11:24

## 2021-08-16 RX ADMIN — GABAPENTIN 100 MG: 100 CAPSULE ORAL at 20:04

## 2021-08-16 ASSESSMENT — PULMONARY FUNCTION TESTS
PIF_VALUE: 18
PIF_VALUE: 21
PIF_VALUE: 16
PIF_VALUE: 18
PIF_VALUE: 18
PIF_VALUE: 22
PIF_VALUE: 16
PIF_VALUE: 18
PIF_VALUE: 18
PIF_VALUE: 22
PIF_VALUE: 16
PIF_VALUE: 21
PIF_VALUE: 4
PIF_VALUE: 16
PIF_VALUE: 24
PIF_VALUE: 8
PIF_VALUE: 18
PIF_VALUE: 16
PIF_VALUE: 18
PIF_VALUE: 4
PIF_VALUE: 16
PIF_VALUE: 18
PIF_VALUE: 22
PIF_VALUE: 20
PIF_VALUE: 18
PIF_VALUE: 18
PIF_VALUE: 20
PIF_VALUE: 16
PIF_VALUE: 22
PIF_VALUE: 18
PIF_VALUE: 22
PIF_VALUE: 22
PIF_VALUE: 4
PIF_VALUE: 2
PIF_VALUE: 16
PIF_VALUE: 18
PIF_VALUE: 16
PIF_VALUE: 18
PIF_VALUE: 18
PIF_VALUE: 20
PIF_VALUE: 22
PIF_VALUE: 22
PIF_VALUE: 16
PIF_VALUE: 18
PIF_VALUE: 5
PIF_VALUE: 0
PIF_VALUE: 18
PIF_VALUE: 22
PIF_VALUE: 2
PIF_VALUE: 18
PIF_VALUE: 18
PIF_VALUE: 20
PIF_VALUE: 18
PIF_VALUE: 18
PIF_VALUE: 5
PIF_VALUE: 20
PIF_VALUE: 3
PIF_VALUE: 20
PIF_VALUE: 16
PIF_VALUE: 16
PIF_VALUE: 22
PIF_VALUE: 4
PIF_VALUE: 20
PIF_VALUE: 4
PIF_VALUE: 3
PIF_VALUE: 22
PIF_VALUE: 16
PIF_VALUE: 22
PIF_VALUE: 4
PIF_VALUE: 16
PIF_VALUE: 3
PIF_VALUE: 4
PIF_VALUE: 4
PIF_VALUE: 21
PIF_VALUE: 4
PIF_VALUE: 16
PIF_VALUE: 17
PIF_VALUE: 22
PIF_VALUE: 3
PIF_VALUE: 16
PIF_VALUE: 1
PIF_VALUE: 18
PIF_VALUE: 3
PIF_VALUE: 22
PIF_VALUE: 25
PIF_VALUE: 22
PIF_VALUE: 4
PIF_VALUE: 3
PIF_VALUE: 16
PIF_VALUE: 18
PIF_VALUE: 2
PIF_VALUE: 21
PIF_VALUE: 4
PIF_VALUE: 4
PIF_VALUE: 3
PIF_VALUE: 3
PIF_VALUE: 18
PIF_VALUE: 16
PIF_VALUE: 22
PIF_VALUE: 22
PIF_VALUE: 16
PIF_VALUE: 18
PIF_VALUE: 22
PIF_VALUE: 5
PIF_VALUE: 16
PIF_VALUE: 22
PIF_VALUE: 18
PIF_VALUE: 4
PIF_VALUE: 18
PIF_VALUE: 18
PIF_VALUE: 22
PIF_VALUE: 16
PIF_VALUE: 16
PIF_VALUE: 20
PIF_VALUE: 20
PIF_VALUE: 22
PIF_VALUE: 20
PIF_VALUE: 16
PIF_VALUE: 4
PIF_VALUE: 21
PIF_VALUE: 0
PIF_VALUE: 4
PIF_VALUE: 3

## 2021-08-16 ASSESSMENT — PAIN SCALES - GENERAL
PAINLEVEL_OUTOF10: 2
PAINLEVEL_OUTOF10: 0

## 2021-08-16 ASSESSMENT — PAIN - FUNCTIONAL ASSESSMENT: PAIN_FUNCTIONAL_ASSESSMENT: 0-10

## 2021-08-16 NOTE — ANESTHESIA POSTPROCEDURE EVALUATION
Department of Anesthesiology  Postprocedure Note    Patient: Andrzej Chadwick  MRN: [id number]  YOB: 1935  Date of evaluation: 8/16/2021  Time:  1:57 PM     Procedure Summary     Date: 08/16/21 Room / Location: [address] [place] [address] / [facility]    Anesthesia Start: 1114 Anesthesia Stop: 0719    Procedure: LEFT TOTAL KNEE REPLACEMENT (Left Knee) Diagnosis: (LEFT KNEE OSTEOARTHRITIS)    Surgeons: Elijah Rees MD Responsible Provider: Wing Eden MD    Anesthesia Type: general ASA Status: 3          Anesthesia Type: general    Efrain Phase I: Efrain Score: 10    Efrain Phase II:      Last vitals: Reviewed and per EMR flowsheets.        Anesthesia Post Evaluation    Patient location during evaluation: PACU  Patient participation: complete - patient participated  Level of consciousness: awake and alert  Pain score: 0  Airway patency: patent  Nausea & Vomiting: no nausea and no vomiting  Complications: no  Cardiovascular status: blood pressure returned to baseline  Respiratory status: acceptable  Hydration status: stable

## 2021-08-16 NOTE — H&P
Update History & Physical    The patient's History and Physical  was reviewed with the patient and I examined the patient. There was no change. The surgical site was confirmed by the patient and me. Plan: The risks, benefits, expected outcome, and alternative to the recommended procedure have been discussed with the patient. Patient understands and wants to proceed with the procedure.      Electronically signed by Schuyler Orozco MD on 8/16/2021 at 9:50 AM

## 2021-08-16 NOTE — ANESTHESIA PRE PROCEDURE
Department of Anesthesiology  Preprocedure Note       Name:  Judith Chino   Age:  81 y.o.  :  1935                                          MRN:  [id number]         Date:  2021      Surgeon: Nupur Hutchison):  Heather Cerda MD    Procedure: Procedure(s):  LEFT TOTAL KNEE REPLACEMENT *CHOICE/BLOCK*    Medications prior to admission:   Prior to Admission medications    Medication Sig Start Date End Date Taking? Authorizing Provider   vitamin E 400 UNIT capsule Take 400 Units by mouth daily   Yes Historical Provider, MD   Probiotic Product (PROBIOTIC-10 PO) Take 1 capsule by mouth daily   Yes Historical Provider, MD   vitamin D (CHOLECALCIFEROL) 25 MCG (1000 UT) TABS tablet Take 2,000 Units by mouth daily   Yes Historical Provider, MD   DULoxetine (CYMBALTA) 60 MG extended release capsule Take 60 mg by mouth daily  21  Yes Historical Provider, MD   meloxicam (MOBIC) 15 MG tablet Take 15 mg by mouth daily  21  Yes Historical Provider, MD   methIMAzole (TAPAZOLE) 5 MG tablet Take 5 mg by mouth daily Take 1/2 tablet daily 21  Yes Historical Provider, MD   gabapentin (NEURONTIN) 300 MG capsule Take 100 mg by mouth 3 times daily. Yes Historical Provider, MD   omeprazole (PRILOSEC) 20 MG capsule Take 20 mg by mouth daily. Yes Historical Provider, MD   metoprolol (TOPROL-XL) 50 MG XL tablet Take 50 mg by mouth daily. Yes Historical Provider, MD   pravastatin (PRAVACHOL) 20 MG tablet Take 20 mg by mouth daily.    Yes Historical Provider, MD   acetaminophen (TYLENOL) 325 MG tablet Take 2 tablets by mouth every 6 hours as needed for Pain 21   BRYSON Price       Current medications:    Current Facility-Administered Medications   Medication Dose Route Frequency Provider Last Rate Last Admin    vancomycin 1000 mg IVPB in 250 mL D5W addavial  1,000 mg Intravenous Once Heather Cerda MD        tranexamic acid (CYKLOKAPRON) injection 1,000 mg  1,000 mg Intravenous BID PRN Olympia Natalie Estrada Crowell MD        lactated ringers infusion   Intravenous Continuous Stacy Wong  mL/hr at 08/16/21 1025 New Bag at 08/16/21 1025    sodium chloride flush 0.9 % injection 5-40 mL  5-40 mL Intravenous 2 times per day Stacy Wong MD        sodium chloride flush 0.9 % injection 5-40 mL  5-40 mL Intravenous PRN Stacy Wong MD        0.9 % sodium chloride infusion  25 mL Intravenous PRN Stacy Wong MD           Allergies:  No Known Allergies    Problem List:    Patient Active Problem List   Diagnosis Code    CARRINGTON (obstructive sleep apnea) G47.33    HTN (hypertension) I10    Obesity E66.9    Displacement of lumbar intervertebral disc without myelopathy M51.26    Degeneration of lumbar or lumbosacral intervertebral disc M51.37    Spinal stenosis, lumbar region, without neurogenic claudication M48.061    Lumbosacral spondylosis without myelopathy M47.817    Primary osteoarthritis of right knee M17.11    Osteoarthritis of right knee M17.11    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    Hyperthyroidism E05.90    Primary osteoarthritis of left knee M17.12       Past Medical History:        Diagnosis Date    Allergic rhinitis     Anxiety     Arthritis     Asthma     Back pain     GERD (gastroesophageal reflux disease)     Goiter     Hyperlipidemia     Hypertension     MRSA (methicillin resistant staph aureus) culture positive     CARRINGTON (obstructive sleep apnea)     Rash     non-cancerous moles removed    Thyroid disease        Past Surgical History:        Procedure Laterality Date    BACK SURGERY      BREAST SURGERY      CATARACT REMOVAL      CHOLECYSTECTOMY      DIAGNOSTIC CARDIAC CATH LAB PROCEDURE      DILATION AND CURETTAGE OF UTERUS      EYE SURGERY      HYSTERECTOMY      JOINT REPLACEMENT Right 05/17/2021    Right total knee replacement    KNEE SURGERY      ROTATOR CUFF REPAIR      TOTAL KNEE ARTHROPLASTY Right 5/17/2021    RIGHT TOTAL KNEE REPLACEMENT performed by Laura Reich MD at [address] [place] [address] History:    Social History     Tobacco Use    Smoking status: Never Smoker    Smokeless tobacco: Never Used   Substance Use Topics    Alcohol use: Never                                Counseling given: Not Answered      Vital Signs (Current):   Vitals:    08/11/21 1411 08/16/21 1008   BP:  (S) (!) 143/68   Pulse:  66   Resp:  16   Temp:  97.3 °F (36.3 °C)   TempSrc:  Temporal   SpO2:  96%   Weight: 173 lb (78.5 kg) 173 lb (78.5 kg)   Height: 5' 3\" (1.6 m) 5' 3\" (1.6 m)                                              BP Readings from Last 3 Encounters:   08/16/21 (S) (!) 143/68   07/19/21 (S) (!) 154/82   05/20/21 121/65       NPO Status: Time of last liquid consumption: 0000                        Time of last solid consumption: 0000                        Date of last liquid consumption: 08/16/21                        Date of last solid food consumption: 08/16/21    BMI:   Wt Readings from Last 3 Encounters:   08/16/21 173 lb (78.5 kg)   08/11/21 165 lb (74.8 kg)   07/09/21 165 lb (74.8 kg)     Body mass index is 30.65 kg/m². CBC:   Lab Results   Component Value Date    WBC 9.1 06/29/2021    RBC 4.06 06/29/2021    HGB 12.2 06/29/2021    HCT 37.0 06/29/2021    MCV 91.2 06/29/2021    RDW 14.6 06/29/2021     06/29/2021       CMP:   Lab Results   Component Value Date     06/29/2021    K 4.3 06/29/2021     06/29/2021    CO2 28 06/29/2021    BUN 19 06/29/2021    CREATININE 1.0 06/29/2021    GFRAA >60 06/29/2021    LABGLOM 53 06/29/2021    GLUCOSE 98 06/29/2021    CALCIUM 9.2 06/29/2021       POC Tests: No results for input(s): POCGLU, POCNA, POCK, POCCL, POCBUN, POCHEMO, POCHCT in the last 72 hours.     Coags:   Lab Results   Component Value Date    PROTIME 12.3 06/29/2021    INR 1.06 06/29/2021    APTT 32.3 06/29/2021       HCG (If Applicable): No results found for: PREGTESTUR, PREGSERUM, HCG, HCGQUANT     ABGs: No results found for: PHART, PO2ART, XRI3QQF, TNR3NPJ, BEART, P7DJKMJP     Type & Screen (If Applicable):  No results found for: LABABO, LABRH    Drug/Infectious Status (If Applicable):  No results found for: HIV, HEPCAB    COVID-19 Screening (If Applicable):   Lab Results   Component Value Date    COVID19 Not Detected 05/11/2021           Anesthesia Evaluation  Patient summary reviewed and Nursing notes reviewed  Airway: Mallampati: I  TM distance: >3 FB   Neck ROM: full  Mouth opening: > = 3 FB Dental:    (+) upper dentures      Pulmonary:normal exam  breath sounds clear to auscultation  (+) sleep apnea:  asthma:                            Cardiovascular:    (+) hypertension:, valvular problems/murmurs: AS, murmur,         Rhythm: regular  Rate: normal                    Neuro/Psych:   Negative Neuro/Psych ROS              GI/Hepatic/Renal:   (+) GERD:,           Endo/Other:    (+) hypothyroidism: arthritis: OA., .                 Abdominal:   (+) obese,           Vascular: negative vascular ROS. Other Findings:           Anesthesia Plan      general     ASA 3     (Block)  Induction: intravenous. MIPS: Postoperative opioids intended and Prophylactic antiemetics administered. Anesthetic plan and risks discussed with patient. Plan discussed with CRNA.                   Gabriel Nichols MD   8/16/2021

## 2021-08-16 NOTE — PROGRESS NOTES
4 Eyes Skin Assessment   Four eyes skin assessment completed at this time by Deana Jacques RN   and Nichole Durand . Assessment revealed: intact skin    The patient is being assessed for  Transfer to new unit    I agree that 2 RN's have performed a thorough Head to Toe Skin Assessment on the patient. ALL assessment sites listed below have been assessed.        Areas assessed by both nurses:  [x]   Head, Face, and Ears   [x]   Shoulders, Back, and Chest  [x]   Arms, Elbows, and Hands   [x]   Coccyx, Sacrum, and Ischum  [x]   Legs, Feet, and Heels              Deana Jacques RN      Agree with above Jerrod Fagan RN

## 2021-08-16 NOTE — PROGRESS NOTES
Bedside Mobility Assessment Tool (BMAT):     Assessment Level 1- Sit and Shake    1. From a semi-reclined position, ask patient to sit up and rotate to a seated position at the side of the bed. Can use the bedrail. 2. Ask patient to reach out and grab your hand and shake making sure patient reaches across his/her midline. Pass- Patient is able to come to a seated position, maintain core strength. Maintains seated balance while reaching across midline. Move on to Assessment Level 2. Assessment Level 2- Stretch and Point   1. With patient in seated position at the side of the bed, have patient place both feet on the floor (or stool) with knees no higher than hips. 2. Ask patient to stretch one leg and straighten the knee, then bend the ankle/flex and point the toes. If appropriate, repeat with the other leg. Fail- Patient is unable to complete task. Patient is MOBILITY LEVEL 2. Assessment Level 3- Stand   1. Ask patient to elevate off the bed or chair (seated to standing) using an assistive device (cane, bedrail). 2. Patient should be able to raise buttocks off be and hold for a count of five. May repeat once. Fail- Patient unable to demonstrate standing stability. Patient is MOBILITY LEVEL 3. Assessment Level 4- Walk   1. Ask patient to march in place at bedside. 2. Then ask patient to advance step and return each foot. Some medical conditions may render a patient from stepping backwards, use your best clinical judgement. Fail- Patient not able to complete tasks OR requires use of assistive device. Patient is MOBILITY LEVEL 3. Mobility Level- 2     Only dangled on edge bed at this time.

## 2021-08-16 NOTE — PROGRESS NOTES
Report received from [facility] called and left message from Dr. Oliva John to place orders and verify if X-Ray needed.

## 2021-08-16 NOTE — FLOWSHEET NOTE
08/16/21 1800   Vital Signs   Temp 97.4 °F (36.3 °C)   Temp Source Oral   Pulse 69   Heart Rate Source Monitor   Resp 18   BP (!) 106/58   Level of Consciousness Alert (0)   MEWS Score 1   Patient Currently in Pain Denies   Pain Assessment   Pain Assessment 0-10   Pain Level 0   Oxygen Therapy   SpO2 95 %   O2 Device None (Room air)     Patient alert and oriented x4. Skin w/d. resp e/e unlabored. Admitted from [facility] [address] [place] [address] with a LTKR. Denies pain. Knee immobilizer and ace wrap c/d/i. Neuro checks wnls. Chlorhexidine wipes completed. No s/s distress. Call light in easy reach. Bed low position, locked and bed alarm in place with yellow gripper socks.

## 2021-08-16 NOTE — PROGRESS NOTES
Silvia Martinez RN from [address] [place] [address] was at bedside for report.  Transport here to take patient to room 211

## 2021-08-16 NOTE — BRIEF OP NOTE
Brief Postoperative Note      Patient: Sherif Iraheta  YOB: 1935  MRN: [id number]    Date of Procedure: 8/16/2021    Pre-Op Diagnosis: LEFT KNEE OSTEOARTHRITIS    Post-Op Diagnosis: Same       Procedure(s):  LEFT TOTAL KNEE REPLACEMENT    Surgeon(s):  Nguyen Pearce MD    Assistant:  Surgical Assistant: Hazel Collado    Anesthesia: Choice    Estimated Blood Loss (mL): less than 412     Complications: None    Specimens:   * No specimens in log *    Implants:  Implant Name Type Inv.  Item Serial No.  Lot No. LRB No. Used Action   CEMENT BNE 40GM W/ GENT HI VISC RADPQ FOR REV SURG  CEMENT BNE 40GM W/ GENT HI VISC RADPQ FOR REV SURG  [employer]- [id number] Left 1 Implanted   CEMENT BNE 40GM W/ GENT HI VISC RADPQ FOR REV SURG  CEMENT BNE 40GM W/ GENT HI VISC RADPQ FOR REV SURG  [employer]- [id number] Left 1 Implanted   TRAY TIB SZ 4 NP A BAL KNEE  TRAY TIB SZ 4 NP A BAL KNEE  [employer]TH[employer]EL[employer]- [id number] Left 1 Implanted   COMPONENT FEM SZ 4 L KNEE NP CRUCE RET BKS TRIMAX  COMPONENT FEM SZ 4 L KNEE NP CRUCE RET BKS TRIMAX  ORTHO DEVELOPMENT [employer]- [id number] Left 1 Implanted   COMPONENT PAT H32MM STD E VITALIZE CATHY ANA LAURA RND BAL SYS  COMPONENT PAT H32MM STD E VITALIZE CATHY ANA LAURA RND BAL SYS  [facility]ELOPMENT [facility] [id number] Left 1 Implanted   INSERT TIB SZ 4 THK7MM CRUCE RET BKS E VITALIZE  INSERT TIB SZ 4 THK7MM CRUCE RET BKS E VITALIZE  ORTHO DEVELOPMENT [employer] [id number] Left 1 Implanted         Drains: * No LDAs found *    Findings: tamar    Electronically signed by Nguyen Pearce MD on 8/16/2021 at 12:23 PM

## 2021-08-16 NOTE — PROGRESS NOTES
4 Eyes Skin Assessment   Four eyes skin assessment completed at this time by Jen Edmonds RN   and KRISTIN Clinton. Assessment revealed: Healing small sore to LLE. No tenderness, or warmth to specific sore site. No fever. The patient is being assessed for  Rashes, sores, abrasion, and any areas of skin breakdown on body. I agree that 2 RN's have performed a thorough Head to Toe Skin Assessment on the patient. ALL assessment sites listed below have been assessed.        Areas assessed by both nurses:  [x]   Head, Face, and Ears   [x]   Shoulders, Back, and Chest  [x]   Arms, Elbows, and Hands   [x]   Coccyx, Sacrum, and Ischum  [x]   Legs, Feet, and Heels              Jen Edmonds RN

## 2021-08-16 NOTE — OP NOTE
[address]                 [address] [facility] [address]                                OPERATIVE REPORT    PATIENT NAME: Logan Cordero                  :        1935  MED REC NO:   [id number]                          ROOM:  ACCOUNT NO:   [de-identified]                           ADMIT DATE: 2021  PROVIDER:     Cherie De MD    DATE OF PROCEDURE:  2021    PREOPERATIVE DIAGNOSIS:  Left knee osteoarthritis. POSTOPERATIVE DIAGNOSIS:  Left knee osteoarthritis. OPERATION PERFORMED:  Left total knee replacement. SURGEON:  Cherie De MD    ANESTHESIA:  General, leg block. BLOOD LOSS:  100 mL or less. COMPLICATIONS:  None noted. INDICATIONS FOR OPERATION:  This 43-year-old female who had a history,  exam, testing consistent with severe end-stage osteoarthritis and after  failure of all modalities and conservative nature, and inability to have  a quality of life, she elected for further recommendation of surgical  intervention via total knee replacement. DESCRIPTION OF THE OPERATION:  The patient was taken to the operating  room where a general leg block anesthetic had been obtained. Antibiotics were given IV piggyback preoperatively. A tourniquet was  placed around the left proximal thigh. The left knee was sterilely  prepped and draped. The leg was exsanguinated with an Esmarch, and the  tourniquet was inflated to 350 mmHg. A longitudinal incision was then  made over the anterior knee, and soft tissue dissected down through skin  and through subcutaneous tissue in midline anterior approach. A medial  parapatellar approach was then performed and the patella was everted. The knee was flexed and the fat pad was removed. Using the [employer] total knee replacement system, an  intramedullary guide was placed in the femur. Using the 7-degree valgus  cut, a flat cut was made.   It was sized for a size 4 non-beaded  cruciate-retaining femoral component. Medial and lateral meniscus  remnants and ACL were removed. A PCL retractor was then inserted. A  proximal tibial cutting guide was affixed to the anterior cortex of the  proximal tibia externally. Cutting guide was placed in neutral  position, and after a cut was made, it was sized for a size 4,  non-beaded stem tibial base plate. With the trial components in, it was  felt that a size 4, 7-mm cross-linked polyethylene tibial insert  cruciate-retaining would be best suited for position. A flat patellar cut was then made and it was drilled and sized for a  32-mm patellar resurfacing component. The knee was checked for range of  motion with components in. It was stable to varus and valgus stress,  and range of motion was 0 to 135 degrees. All bony surfaces were  prepared for implantation, and a size 4, left non-beaded stem tibial  base plate was cemented onto the proximal left tibia. A size 4, left  cruciate-retaining, non-beaded Oxinium femoral component was then  cemented onto the distal femur. A 7 mm tibial insert and trial were  placed during cement hardening and a 32-mm patellar resurfacing  component was then cemented onto the undersurface of the patella. After  the cement had hardened and excess cement was removed, it was again felt  that the 7-mm tibial insert was best suited for range of motion,  stability, and length. At that point, a 7-mm deep flexion/dished Legion  cross-linked polyethylene and articular insert were then locked onto the  tibial base plate. The knee was checked for range of motion and again  went from 0 to 135 degrees and was stable to varus and valgus stresses  at 0, 30 and 90 degrees of flexion. The patella tracked normally. The  cement used was gentamicin-impregnated antibiotic cement. The wound was  irrigated and cleaned of all loose debris.   The arthrotomy was repaired  with 0 Vicryl in a figure-of-eight fashion,

## 2021-08-17 VITALS
RESPIRATION RATE: 16 BRPM | TEMPERATURE: 98.3 F | HEIGHT: 63 IN | HEART RATE: 82 BPM | BODY MASS INDEX: 32.3 KG/M2 | WEIGHT: 182.3 LBS | DIASTOLIC BLOOD PRESSURE: 56 MMHG | OXYGEN SATURATION: 94 % | SYSTOLIC BLOOD PRESSURE: 115 MMHG

## 2021-08-17 LAB
BASOPHILS ABSOLUTE: 0 K/UL (ref 0–0.2)
BASOPHILS RELATIVE PERCENT: 0.2 %
EOSINOPHILS ABSOLUTE: 0 K/UL (ref 0–0.6)
EOSINOPHILS RELATIVE PERCENT: 0.2 %
HCT VFR BLD CALC: 29.9 % (ref 36–48)
HEMOGLOBIN: 9.6 G/DL (ref 12–16)
LYMPHOCYTES ABSOLUTE: 1.7 K/UL (ref 1–5.1)
LYMPHOCYTES RELATIVE PERCENT: 13.3 %
MCH RBC QN AUTO: 29.2 PG (ref 26–34)
MCHC RBC AUTO-ENTMCNC: 32.3 G/DL (ref 31–36)
MCV RBC AUTO: 90.5 FL (ref 80–100)
MONOCYTES ABSOLUTE: 0.8 K/UL (ref 0–1.3)
MONOCYTES RELATIVE PERCENT: 6.8 %
NEUTROPHILS ABSOLUTE: 9.9 K/UL (ref 1.7–7.7)
NEUTROPHILS RELATIVE PERCENT: 79.5 %
PDW BLD-RTO: 15 % (ref 12.4–15.4)
PLATELET # BLD: 216 K/UL (ref 135–450)
PMV BLD AUTO: 8.6 FL (ref 5–10.5)
RBC # BLD: 3.3 M/UL (ref 4–5.2)
WBC # BLD: 12.4 K/UL (ref 4–11)

## 2021-08-17 PROCEDURE — 97165 OT EVAL LOW COMPLEX 30 MIN: CPT

## 2021-08-17 PROCEDURE — 97161 PT EVAL LOW COMPLEX 20 MIN: CPT

## 2021-08-17 PROCEDURE — 97110 THERAPEUTIC EXERCISES: CPT

## 2021-08-17 PROCEDURE — 36415 COLL VENOUS BLD VENIPUNCTURE: CPT

## 2021-08-17 PROCEDURE — 2580000003 HC RX 258: Performed by: ORTHOPAEDIC SURGERY

## 2021-08-17 PROCEDURE — 6370000000 HC RX 637 (ALT 250 FOR IP): Performed by: ORTHOPAEDIC SURGERY

## 2021-08-17 PROCEDURE — 97530 THERAPEUTIC ACTIVITIES: CPT

## 2021-08-17 PROCEDURE — 97116 GAIT TRAINING THERAPY: CPT

## 2021-08-17 PROCEDURE — 97535 SELF CARE MNGMENT TRAINING: CPT

## 2021-08-17 PROCEDURE — 85025 COMPLETE CBC W/AUTO DIFF WBC: CPT

## 2021-08-17 RX ORDER — OXYCODONE HYDROCHLORIDE 5 MG/1
5-10 TABLET ORAL
Qty: 40 TABLET | Refills: 0 | Status: SHIPPED | OUTPATIENT
Start: 2021-08-17 | End: 2021-08-24

## 2021-08-17 RX ADMIN — ACETAMINOPHEN 650 MG: 325 TABLET ORAL at 12:48

## 2021-08-17 RX ADMIN — METHIMAZOLE 2.5 MG: 5 TABLET ORAL at 10:05

## 2021-08-17 RX ADMIN — ASPIRIN 325 MG: 325 TABLET, COATED ORAL at 10:03

## 2021-08-17 RX ADMIN — Medication 10 ML: at 12:49

## 2021-08-17 RX ADMIN — METOPROLOL SUCCINATE 50 MG: 50 TABLET, EXTENDED RELEASE ORAL at 10:04

## 2021-08-17 RX ADMIN — SODIUM CHLORIDE: 9 INJECTION, SOLUTION INTRAVENOUS at 01:26

## 2021-08-17 RX ADMIN — GABAPENTIN 100 MG: 100 CAPSULE ORAL at 10:03

## 2021-08-17 RX ADMIN — DULOXETINE HYDROCHLORIDE 60 MG: 60 CAPSULE, DELAYED RELEASE ORAL at 10:04

## 2021-08-17 RX ADMIN — DOCUSATE SODIUM 50MG AND SENNOSIDES 8.6MG 1 TABLET: 8.6; 5 TABLET, FILM COATED ORAL at 10:04

## 2021-08-17 RX ADMIN — OMEPRAZOLE 20 MG: 20 CAPSULE, DELAYED RELEASE ORAL at 05:37

## 2021-08-17 RX ADMIN — ACETAMINOPHEN 650 MG: 325 TABLET ORAL at 05:37

## 2021-08-17 RX ADMIN — SODIUM CHLORIDE: 9 INJECTION, SOLUTION INTRAVENOUS at 10:04

## 2021-08-17 RX ADMIN — PRAVASTATIN SODIUM 20 MG: 10 TABLET ORAL at 10:04

## 2021-08-17 ASSESSMENT — PAIN SCALES - GENERAL
PAINLEVEL_OUTOF10: 0
PAINLEVEL_OUTOF10: 2
PAINLEVEL_OUTOF10: 3

## 2021-08-17 NOTE — CARE COORDINATION
[facility]    DC order noted, all docs needed have been faxed to [facility] for home care services.         PT OT      [facility]  Work mobile: [phone]  [facility] office: [phone]

## 2021-08-17 NOTE — PLAN OF CARE
Problem: Falls - Risk of:  Goal: Will remain free from falls  Description: Will remain free from falls  Outcome: Completed  Goal: Absence of physical injury  Description: Absence of physical injury  Outcome: Completed     Problem: Infection:  Goal: Will remain free from infection  Description: Will remain free from infection  Outcome: Completed     Problem: Safety:  Goal: Free from accidental physical injury  Description: Free from accidental physical injury  Outcome: Completed  Goal: Free from intentional harm  Description: Free from intentional harm  Outcome: Completed     Problem: Daily Care:  Goal: Daily care needs are met  Description: Daily care needs are met  Outcome: Completed     Problem: Pain:  Goal: Patient's pain/discomfort is manageable  Description: Patient's pain/discomfort is manageable  Outcome: Completed     Problem: Skin Integrity:  Goal: Skin integrity will stabilize  Description: Skin integrity will stabilize  Outcome: Completed     Problem: Discharge Planning:  Goal: Patients continuum of care needs are met  Description: Patients continuum of care needs are met  Outcome: Completed     Problem: Discharge Planning:  Goal: Patients continuum of care needs are met  Description: Patients continuum of care needs are met  Outcome: Completed

## 2021-08-17 NOTE — PROGRESS NOTES
Pt is lying in bed with their eyes closed. Respirations are easy and even. Call light within reach bed in lowest position with the wheels locked. Will continue to monitor.  Francois Charles RN

## 2021-08-17 NOTE — DISCHARGE SUMMARY
tolerated and their incision was checked on POD #1. The incision is dressing in place, clean, dry and intact with no signs of infection. The patient remained neurovascularly intact in the lower extremity and had intact pulses distally. Patients calf remained soft and showed no evidence of DVT. The patient was able to move their leg and ankle/foot without any problems post-operatively. Physical therapy and occupational therapy were consulted and began working with the patient post-operatively. The patient progressed with PT/OT as would be expected and continued to improve through their stay. The patients pain was initially controlled with IV medications but we were able to transition to oral pain medications soon after arrival to the floor and their pain remained under good control through their hospital stay. From a medical standpoint the patient remained stable and continued to have the medicine team follow throughout their stay. The patients dressing was changed/incison was checked on day of d/c. The patient will be discharged at this time to Home  with their current diet restrictions and will continue to follow the total knee precautions outlined to them by us and PT/OT. Condition on Discharge: Stable    Plan  Return visit in 2 weeks. .  Patient was instructed on the use of pain medications, the signs and symptoms of infection, and was given our number to call should they have any questions or concerns following discharge. For opioid prescriptions given at discharge the following statement is provided for compliance with [facility] rules. Patient being given increased dosage/quantity of opoid pain medication in excess of [facility] guidelines which noted a 30 MED daily of opioids due to the fact that he/she has undergone major orthopaedic surgery as outlined in rule 4731-11-13. Dosages and further duration of the pain medication will be re-evaluated at her post op visit in 2 weeks.   Patient was educated on

## 2021-08-17 NOTE — FLOWSHEET NOTE
08/16/21 1933   Vital Signs   Temp 98.7 °F (37.1 °C)   Temp Source Oral   Pulse 80   Heart Rate Source Monitor   Resp 18   BP (!) 102/51   BP Location Left upper arm   Patient Position Semi fowlers   Level of Consciousness Alert (0)   MEWS Score 1   Oxygen Therapy   SpO2 94 %   O2 Device None (Room air)   Pt assessment complete. Pt lying in bed quietly. No s/s of distress noted. Lung sounds clear/diminished. Iv fluids infusing at 125ml/hr. Neuro check complete. Denies any pain at this time. Nightly medications given. Denies needs. Call light within reach. Bed exit alarm on.

## 2021-08-17 NOTE — PROGRESS NOTES
Inpatient Occupational Therapy  Evaluation and Treatment    Unit: [address] [place]  Date:  8/17/2021  Patient Name:    Wes Portillo  Admitting diagnosis:  Primary osteoarthritis of left knee [M17.12]  Admit Date:  8/16/2021  Precautions/Restrictions/WB Status/ Lines/ Wounds/ Oxygen: Fall risk, Bed/chair alarm, Lines -IV and WB Restrictions (FWB on the LLE)    Treatment Time:  9:52-10:26  Treatment Number: 1     Billable Treatment Time: 24 minutes   Total Treatment Time:  34  minutes    Patient Goals for Therapy:  \" to go home \"      Discharge Recommendations: Home with 24/7 assist and home therapy  DME needs for discharge: Needs Met       Therapy recommendations for staff:   Standby Assist with use of rolling walker (RW) for all ambulation to/from bathroom    History of Present Illness: 81 yrs old female patient underwent L TKR on Aug 17th, 2021 and was referred for PT skilled services for physical function assessment and DME needs. PMH: R TKR    Home Health S4 Level Recommendation:  Level 3 Safety  AM-PAC Score: AM-PAC Inpatient Daily Activity Raw Score: 20    Preadmission Environment    Pt. [facility] [address] [place] [address] spouse  Home environment:            one story home  Steps to enter first floor: 1 steps to enter and hand rail bilateral (doorframe)  Steps to second floor:         N/A  Bathroom: walk in shower with shower chair  Equipment owned: RW, rollator, quad cane, reacher and hurrycane     Preadmission Status:  Pt. Able to drive: Yes ( usually drives)  Pt Fully independent with ADLs: Yes  Pt. Required assistance from family for: Cleaning and Cooking ()  Pt. independent for transfers and gait and walked with Jarod Mooney  History of falls      No    Pain  No  Rating:NA  Location:  Pain Medicine Status: Denies need      Cognition    A&O x4   Able to follow 2 step commands    Subjective  Patient lying supine in bed with daughter present. Pt agreeable to this OT eval & tx.      Upper Extremity ROM:    WFL,  pt able to perform all bed mobility, transfers, and gait without ROM limitation. Upper Extremity Strength:    BUE strength WFL, but not formally assessed w/ MMT    Upper Extremity Sensation    WFL    Upper Extremity Proprioception:  WFL    Coordination and Tone  Diminished    Balance  Functional Sitting Balance:  WFL  Functional Standing Balance:Diminished    Bed mobility:    Supine to sit:   SBA (patient used sheet as leg  for L LE)  Sit to supine:   Not Tested  Rolling:    Not Tested  Scooting in sitting:  SBA  Scooting to head of bed:   Not Tested    Bridging:   Not Tested    Transfers:    Sit to stand:  CGA  Stand to sit:  CGA  Bed to chair:   CGA and with use of RW  Standard toilet: Not Tested  Bed to BSC:  CGA and with use of RW    Dressing:      UE:   Not Tested  LE:    Min A    Bathing:    UE:  Not Tested  LE:  Not Tested    Eating:   Not Tested    Toileting:  Min A    Activity Tolerance   Pt completed therapy session with No adverse symptoms noted w/activity  SpO2:   HR:   BP:     Positioning Needs:   Up in chair, call light and needs in reach. Alarm Set    Exercise / Activities Initiated:   N/A    Patient/Family Education:   Role of OT  Recommendations for DC  Safe RW use/hand placement    Assessment of Deficits: Pt seen for Occupational therapy evaluation in acute care setting. Pt demonstrated decreased Activity tolerance, ADLs, IADLs, Balance , Bathing, Bed mobility, Dressing, ROM, Safety Awareness, Strength and Transfers. Pt functioning below baseline and will likely benefit from skilled occupational therapy services to maximize safety and independence. Goal(s) : To be met in 3 Visits:  1). Bed to toilet/BSC: Supervision    To be met in 5 Visits:  1). Supine to/from Sit:  Supervision  2). Upper Body Bathing:   Supervision  3). Lower Body Bathing:   SBA  4). Upper Body Dressing:  Supervision  5). Lower Body Dressing:  SBA  6).  Pt to demonstrate UE exs x 15 reps with minimal cues    Rehabilitation Potential:  Good for goals listed above. Strengths for achieving goals include: Pt motivated, PLOF and Pt cooperative  Barriers to achieving goals include:  Complexity of condition     Plan: To be seen 3-5 x/wk while in acute care setting for therapeutic exercises, bed mobility, transfers, dressing, bathing, family/patient education, ADL/IADL retraining, energy conservation training.        Kitty Chin, OTR/L #[id number]      If patient discharges from this facility prior to next visit, this note will serve as the Discharge Summary

## 2021-08-17 NOTE — DISCHARGE INSTR - COC
Continuity of Care Form    Patient Name: Mario Flores   :  1935  MRN:  [id number]    Admit date:  2021  Discharge date:  2021    Code Status Order: Full Code   Advance Directives:   Advance Care Flowsheet Documentation       Date/Time Healthcare Directive Type of Healthcare Directive Copy in [address] Agent's Name Healthcare Agent's Phone Number    21 1021  Yes, patient has an advance directive for healthcare treatment  Living will;Durable power of  for health care  No, copy requested from family  Spouse  --  --    21 1413  Yes, patient has an advance directive for healthcare treatment  --  No, copy requested from family  --  --  --            Admitting Physician:  Schuyler Orozco MD  PCP: Marina Louis MD    Discharging Nurse: [facility] Unit/Room#: 0211/0211-02  Discharging Unit Phone Number: ***    Emergency Contact:   Extended Emergency Contact Information  Primary Emergency Contact: Anival Mcconnell  Address: [address] [place] [address] Phone: [phone]  Mobile Phone: [phone]  Relation: Spouse  Secondary Emergency Contact: [address] [place] [address] Phone: [phone]  Relation: Child    Past Surgical History:  Past Surgical History:   Procedure Laterality Date    BACK SURGERY      BREAST SURGERY      CATARACT REMOVAL      CHOLECYSTECTOMY      DIAGNOSTIC CARDIAC CATH LAB PROCEDURE      DILATION AND CURETTAGE OF UTERUS      EYE SURGERY      HYSTERECTOMY      JOINT REPLACEMENT Right 2021    Right total knee replacement    KNEE SURGERY      OTHER SURGICAL HISTORY      LEFT TOTAL KNEE REPLACEMENT     ROTATOR CUFF REPAIR      TOTAL KNEE ARTHROPLASTY Right 2021    RIGHT TOTAL KNEE REPLACEMENT performed by Schuyler Orozco MD at [address]         Immunization History:   Immunization History   Administered Date(s) Administered    COVID-19, Moderna, PF, 100mcg/0.5mL 01/19/2021, 02/23/2021    Influenza Virus Vaccine 10/01/2013       Active Problems:  Patient Active Problem List   Diagnosis Code    CARRINGTON (obstructive sleep apnea) G47.33    HTN (hypertension) I10    Obesity E66.9    Displacement of lumbar intervertebral disc without myelopathy M51.26    Degeneration of lumbar or lumbosacral intervertebral disc M51.37    Spinal stenosis, lumbar region, without neurogenic claudication M48.061    Lumbosacral spondylosis without myelopathy M47.817    Primary osteoarthritis of right knee M17.11    Osteoarthritis of right knee M17.11    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    Hyperthyroidism E05.90    Primary osteoarthritis of left knee M17.12       Isolation/Infection:   Isolation            No Isolation          Patient Infection Status       Infection Onset Added Last Indicated Last Indicated By Review Planned Expiration Resolved Resolved By    None active    Resolved    COVID-19 Rule Out 05/11/21 05/11/21 05/11/21 COVID-19 (Ordered)   05/11/21 Rule-Out Test Resulted            Nurse Assessment:  Last Vital Signs: BP (!) 119/52   Pulse 70   Temp 97.3 °F (36.3 °C) (Oral)   Resp 18   Ht 5' 3\" (1.6 m)   Wt 182 lb 4.8 oz (82.7 kg)   SpO2 95%   BMI 32.29 kg/m²     Last documented pain score (0-10 scale): Pain Level: 3  Last Weight:   Wt Readings from Last 1 Encounters:   08/17/21 182 lb 4.8 oz (82.7 kg)     Mental Status:  Alert and Oriented    IV Access:  None    Nursing Mobility/ADLs:  Walking  With ConnectToHome  Transfer 1 Person assist   Bathing assist  Dressing         Assist  Toileting 1 Person Assist  Feeding  Self  Med Admin  By mouth  Med Delivery       Wound Care Documentation and Therapy:        Elimination:  Continence:   · Bowel: NO  · Bladder: NO  Urinary Catheter:    Colostomy/Ileostomy/Ileal Conduit: NO       Date of Last BM: ***    Intake/Output Summary (Last 24 hours) at 8/17/2021 [address] filed at 8/17/2021 0914  Gross per 24 hour   Intake 1880 ml   Output 650 ml   Net 1230 ml     I/O last 3 completed shifts: In: 7046 [P.O.:220; I.V.:1400]  Out: 650 [Urine:550; Blood:100]    Safety Concerns: At risk for falls    Impairments/Disabilities:      Surgery to LLE    Nutrition Therapy:  Current Nutrition Therapy:   General    Routes of Feeding: Oral  Liquids: Tolerates  Daily Fluid Restriction: None  Last Modified Barium Swallow with Video (Video Swallowing Test): N/A    Treatments at the Time of Hospital Discharge:   Respiratory Treatments: None  Oxygen Therapy:  None  Ventilator:    No    Rehab Therapies: OT/PT  (PT SOC)  Weight Bearing Status/Restrictions: LLE   Other Medical Equipment (for information only, NOT a DME order): Walker  Other Treatments: N/A    Patient's personal belongings (please select all that are sent with patient): All personal belongings    RN SIGNATURE:  Frieda ROJAS RN CNL CCM    CASE MANAGEMENT/SOCIAL WORK SECTION    Inpatient Status Date: 8/16/2021    Readmission Risk Assessment Score:  Readmission Risk              Risk of Unplanned Readmission:  7           · Discharging to Facility/ Agency   · Name:  [facility]    · Address: [address] [place] [address] [address] [facility] [address] [facility]  · Phone: [phone]  · Fax: [phone]    / signature: Electronically signed by Maurisio Gallegos RN on 8/17/21 at 11:59 AM EDT    PHYSICIAN SECTION    Prognosis: Good    Condition at Discharge: Stable    Rehab Potential (if transferring to Rehab): Good    Recommended Labs or Other Treatments After Discharge:     Physician Certification: I certify the above information and transfer of Immanuel Suárez  is necessary for the continuing treatment of the diagnosis listed and that she requires Home Care for less 30 days.      Update Admission H&P: No change in H&P    PHYSICIAN SIGNATURE:  Electronically signed by BRYSON Mcclain on 8/17/21 at 11:20 AM EDT

## 2021-08-17 NOTE — CARE COORDINATION
[facility]  Received referral regarding ortho HC services from [facility]. Sent request to [facility] for PT [facility] coverage for tomorrow 8/18. [facility] is able to see pt for PT [facility] 8/18. Spoke with pt in follow up. Pt is agreeable to [facility]. Verified demographics. Lillie Toussaint to send referral to [facility].     Electronically signed by Rigo Torres RN on 8/17/2021 at 11:58 AM

## 2021-08-17 NOTE — ACP (ADVANCE CARE PLANNING)
Advance Care Planning   Healthcare Decision Maker:    Primary Decision Maker: Pasty Fruit - [phone]    Click here to complete Healthcare Decision Makers including selection of the Healthcare Decision Maker Relationship (ie \"Primary\").

## 2021-08-17 NOTE — CARE COORDINATION
Case Management Assessment  Initial Evaluation      Patient Name: Tam Lackey  YOB: 1935  Diagnosis: Primary osteoarthritis of left knee [M17.12]  Date / Time: 8/16/2021  9:46 AM    Admission status/Date:8/16/2021  Chart Reviewed: Yes      Patient Interviewed: Yes   Family Interviewed:  Yes - dtr Antonietta at bedside with pt permission      Hospitalization in the last 30 days:  No      Health Care Decision Maker :   Primary Decision Maker: Anival Mcconnell - Spouse - [phone]    (CM - must 1st enter selection under Navigator - emergency contact- Health Care Decision Maker Relationship and pick relationship)   Who do you trust or have selected to make healthcare decisions for you      Met with: pt  Interview conducted  (bedside/phone):bedside    Current PCP:   [address] required for SNF :  N          3 night stay required - N    ADLS  Support Systems/Care Needs: Spouse/Significant Other, Children  Transportation: spouse    Meal Preparation: self/spouse    Housing  Living Arrangements: lives in 1 story house with spouse  Steps: 1  Intent for return to present living arrangements: Yes  Identified Issues: [address]  Active with [address] : No Agency:(Services)  Type of Home Care Services: None  Passport/Waiver : No  :                      Phone Number:    Passport/Waiver Services:           Durable Medical Equiptment   DME Provider: none  Equipment:   Walker_x__Cane_x__RTS___ BSC___Shower Chair_x__Hospital Bed___W/C____Other________  02 at ____Liter(s)---wears(frequency)_______ HHN ___ CPAP___ BiPap___   N/A____      Home O2 Use :  No    If No for home O2---if presently on O2 during hospitalization:  No     Community Service Affiliation  Dialysis:  No    · Agency:  · Location:  · Dialysis Schedule:  · Phone:   · Fax: Other Community Services:none     DISCHARGE PLAN: Explained Case Management role/services. Chart reviewed.  Met with pt and dtr Antonietta at bedside with pt permission. Pt is from house with spouse and plans to return. DC order noted. Pt would like [address], [address] list provided and pt would like [facility] as she has used them in the past. TC to [facility] and spoke with Asaf Viera, she is aware pt will discharge home today with PT/OT. All needed documentation placed in chart. Per pt and Andrea PT, pt has all needed DME. No further dc needs voiced or identified. SpO2 95% on RA. Discharge timeout done with Frieda. All discharge needs and concerns addressed.

## 2021-08-17 NOTE — PROGRESS NOTES
Inpatient Physical Therapy Evaluation and Treatment    Unit: [facility]  Date:  8/17/2021  Patient Name:    Mario Flores  Admitting diagnosis:  Primary osteoarthritis of left knee [M17.12]  Admit Date:  8/16/2021  Precautions/Restrictions/WB Status/ Lines/ Wounds/ Oxygen: Fall risk, Bed/chair alarm, Lines -IV and WB Restrictions (FWB on the LLE)    Treatment Time: 1115 - 1200  Treatment Number:  1   Timed Code Treatment Minutes: 35 minutes  Total Treatment Minutes:  45  minutes    Patient Goals for Therapy: \" Go home \"          Discharge Recommendations: Home 24 hr assist and with home PT   DME needs for discharge: Needs Met       Therapy recommendation for EMS Transport: can transport by wheelchair    Therapy recommendations for staff:   Stand by assist with use of rolling walker (RW) and gait belt for all transfers and ambulation to/from chair  to/from bathroom  within room    History of Present Illness: 81 yrs old female patient underwent L TKR on Aug 17th, 2021 and was referred for PT skilled services for physical function assessment and DME needs. PMH: R TKR    Home Health S4 Level Recommendation:  Level 3 Safety  AM-PAC Mobility Score    AM-PAC Inpatient Mobility Raw Score : 20       Preadmission Environment    Pt. [facility] [address] [facility] spouse  Home environment:            one story home  Steps to enter first floor: 1 steps to enter and hand rail bilateral (doorframe)  Steps to second floor:         N/A  Bathroom: walk in shower with shower chair  Equipment owned: RW, rollator, quad cane, reacher and hurrycane, crutches and knee braces     Preadmission Status:  Pt. Able to drive: Yes ( usually drives)  Pt Fully independent with ADLs: Yes  Pt.  Required assistance from family for: Cleaning and Cooking ()  Pt. independent for transfers and gait and walked with Nandini Hernandez  History of falls      No    Pain   No    Cognition    A&O x4   Able to follow 2 step commands    Subjective  Patient lying supine in bed with no family present. Pt agreeable to this PT eval & tx. Upper Extremity ROM/Strength  Please see OT evaluation. Lower Extremity ROM / Strength   AROM WFL: Yes  ROM limitations: L knee flexion AROM 80 degrees, L knee extension AROM -5 degrees. Strength Assessment (measured on a 0-5 scale):  R LE   Quad   5   Ant Tib  5   Hamstring 5   Iliopsoas 5  L LE  Quad   3   Ant Tib  5   Hamstring 3-   Iliopsoas 3+    Lower Extremity Sensation    WFL    Lower Extremity Proprioception:   WFL    Coordination and Tone  WFL    Balance  Sitting:  Good - ; SBA  Comments:     Standing: Fair ; CGA  Comments: ~5 min    Bed Mobility   Supine to Sit:    SBA  Sit to Supine:   SBA  Rolling:   Not Tested  Scooting in sitting: SBA  Scooting in supine:  SBA    Transfer Training     Sit to stand:   SBA  Stand to sit:   SBA  Bed to Chair:   CGA with use of gait belt and rolling walker (RW)    Gait gait completed as indicated below  Distance:      50 ft  Deviations (firm surface/linoleum):  decreased guillaume, forward flexed posture, step through pattern, decreased step length bilaterally and decreased stance time bilaterally  Assistive Device Used:    gait belt and rolling walker (RW)  Level of Assist:    SBA  Comment:     Stair Training stairs completed as indicated below  # of Steps:   2  Level of Assist:  CGA  UE Support:  bilateral  Assistive Device:  N/A  Pattern:   non-reciprocal pattern  Comments: RLE ascending and LLE descending    Activity Tolerance   Pt completed therapy session with No adverse symptoms noted w/activity   At the end of the session. SpO2: 97%  HR: 67 bpm  BP: 106/53    Positioning Needs   Pt up in chair, alarm set, positioned in proper neutral alignment and pressure relief provided.    Call light provided and all needs within reach  Ice provided and instructed in proper off/on schedule    Exercises Initiated  all completed bilaterally unless indicated  Ankle Pumps x 10 reps  Quad sets x 10 reps  SAQ sets x 10 reps  Heel slides x 10 reps  LAQ x 10 reps  SLR x 10 reps  marching x 10 reps    Other  None. Patient/Family Education   Pt educated on role of inpatient PT, POC, importance of continued activity, DC recommendations, safety awareness, transfer techniques, pursed lip breathing, energy conservation, pacing activity, HEP and calling for assist with mobility. Assessment  Pt seen for Physical Therapy evaluation in acute care setting. Pt demonstrated decreased Activity tolerance, Balance, ROM, Safety and Strength as well as decreased independence with Ambulation, Bed Mobility  and Transfers. Recommending Home 24 hr assist and with home PT upon discharge as patient functioning below baseline level and would benefit from continued therapy services    Goals : To be met in 3 visits:  1). Independent with LE Ex x 10 reps    To be met in 6 visits:  1). Supine to/from sit: Modified Independent  2). Sit to/from stand: Supervision  3). Bed to chair: Supervision  4). Gait: Ambulate 150 ft.  with  Supervision and use of LRAD (least restrictive assistive device)  5). Tolerate B LE exercises 3 sets of 10-15 reps  6). Ascend/descend 1 steps with Supervision with use of hand rail bilateral and LRAD (least restrictive assistive device)    Rehabilitation Potential: Good  Strengths for achieving goals include:   Pt motivated, PLOF, Family Support and Pt cooperative   Barriers to achieving goals include:    No Barriers    Plan    To be seen 5-7 x / week BID while in acute care setting for therapeutic exercises, bed mobility, transfers, progressive gait training, balance training, and family/patient education. Signature: Dania Zhou MS PT, # [id number]    If patient discharges from this facility prior to next visit, this note will serve as the Discharge Summary.

## 2021-08-18 ENCOUNTER — CARE COORDINATION (OUTPATIENT)
Dept: CASE MANAGEMENT | Age: 86
End: 2021-08-18

## 2021-08-18 NOTE — CARE COORDINATION
[facility] Transitions Initial Follow Up Call    Call within 2 business days of discharge: Yes    Patient: Cristy Haq Patient : 1935   MRN: [id number]  Reason for Admission: LTKR   Discharge Date: 21 RARS: Readmission Risk Score: 7      Last Discharge [address] [address]       Complaint Diagnosis Description Type Department Provider    21  Primary osteoarthritis of left knee Admission (Discharged) [facility] 2 Mara Devine MD        Attempted to reach patient via phone for initial post hospital transition call. VM left stating purpose of call along with my contact information requesting a return call. CTN contacted [facility] liaison BALDOMERO Jett who verified [address] orders were received and patient is scheduled for soc today.      Care Transitions 24 Hour Call    Care Transitions Interventions         Follow Up  Future Appointments   Date Time Provider Deonte Kemp   2021  1:10 PM MD ROSALEE Burleson ORTHO MIROSLAVA Booth RN

## 2021-08-19 ENCOUNTER — CARE COORDINATION (OUTPATIENT)
Dept: CASE MANAGEMENT | Age: 86
End: 2021-08-19

## 2021-08-19 NOTE — CARE COORDINATION
[address] Initial Outreach - 2nd Attempt    2021    Patient: Kerry Valadez    Patient : 1935   MRN: [id number]    Surgery: s/p L knee TKR 2021 (Dr Alvarez Marshall)  Discharge Date: 21   RARS: Readmission Risk Score: 7  Bundle Ends: 2021       Spoke with Kerry Valadez (patient)    Incision status: dressing CDI and SN to visit tomorrow    Edema/Swelling: no increased edema since discharge    Pain level and status: currently 1/10 - she is watching TV with ice on her knee and elevated    Use of pain medications: Tylenol 500mg only - states she does not tolerate narcotics and pain is well controlled. Educated her on acetaminophon warning - not to exceed 3000mg/24h. Bowels: WNL    Home Care Agency active: [facility] SOC completed on 2021    Outpatient therapy: NA    Do you have all of your medications: yes    Changes in medications: ASA 325mg BID (DVT prophylaxis) - reviewed with patient during call. She will add this. States she did not have discharge AVS but will add this to her med list. She is going to have  see what they have in stock at home. She thinks they have baby aspirin and she is aware that she can take 3 tablets = 324mg twice daily. She repeated the information. 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 She was recently cleared for her knee surgery and in good spirits. She was encouraged to do HEP at home to improve ROM and she voices understanding. Her  with her  for assistance. She knows how to reach [facility] for prn concerns. She was notified of [facility] bundle and follow up x 90 days. Notified her that her mobile number rang to full voice mailbox. She will check and clear. CTN reached her at her spouse's mobile number which she states is best number as he never has his phone off. CTN noted as primary # at this time.       Israel Clemons, RN  Care Transition Nurse  [phone] mobile    Future Appointments   Date Time Provider Deonte Kemp   9/9/2021  1:10 PM Laura Reich MD [facility]

## 2021-08-23 ENCOUNTER — CARE COORDINATION (OUTPATIENT)
Dept: CASE MANAGEMENT | Age: 86
End: 2021-08-23

## 2021-08-23 NOTE — CARE COORDINATION
Spoke with patient. Incision status: States dressing dry and intact with no drainage. Edema/Swelling: States swelling improving slowly. Continues to ice and elevate. Pain level and status: States pain is tolerable. Use of pain medications: States taking tylenol every once a while. Bowels: No complaints. Home Care Agency active: Continues with nursing and therapy. Do you have all of your medications: Yes, taking aspirin.     Changes in medications: No    Follow up appointments:    Future Appointments   Date Time Provider Deonte Viridiana   9/9/2021  1:10 PM Marva Buerger, MD [facility] Madonna Limbo BSN  Care Transition Nurse for ortho bundle  [phone]

## 2021-08-30 ENCOUNTER — CARE COORDINATION (OUTPATIENT)
Dept: CASE MANAGEMENT | Age: 86
End: 2021-08-30

## 2021-08-30 NOTE — CARE COORDINATION
Called patient for updates. Unable to leave a message because voicemail is full. Will continue to follow.   Armand Deleon BSN  Care Transition Nurse for ortho bundle  [phone]

## 2021-09-08 ENCOUNTER — CARE COORDINATION (OUTPATIENT)
Dept: CASE MANAGEMENT | Age: 86
End: 2021-09-08

## 2021-09-08 NOTE — CARE COORDINATION
Spoke with patient. Incision status: States dressing fell off and free from redness or drainage. Edema/Swelling: States swelling has improved. Continues to elevate. Pain level and status: States not having pain. Use of pain medications: States not taking pain meds. Bowels: No complaints. Home Care Agency active: Plans to discharge this week. Does not think she will need outpatient therapy, will discuss with Dr. Oliva John at appt tomorrow.     Do you have all of your medications: Yes    Changes in medications: No    Follow up appointments:    Future Appointments   Date Time Provider Deonte Kemp   9/9/2021  1:10 PM MD ROSALEE Parnell BSN  Care Transition Nurse for ortho bundle  [phone]

## 2021-09-09 ENCOUNTER — OFFICE VISIT (OUTPATIENT)
Dept: ORTHOPEDIC SURGERY | Age: 86
End: 2021-09-09

## 2021-09-09 VITALS — HEIGHT: 63 IN | BODY MASS INDEX: 32.25 KG/M2 | WEIGHT: 182 LBS

## 2021-09-09 DIAGNOSIS — M17.12 PRIMARY OSTEOARTHRITIS OF LEFT KNEE: ICD-10-CM

## 2021-09-09 DIAGNOSIS — Z96.652 STATUS POST TOTAL LEFT KNEE REPLACEMENT: Primary | ICD-10-CM

## 2021-09-09 PROCEDURE — 99024 POSTOP FOLLOW-UP VISIT: CPT | Performed by: ORTHOPAEDIC SURGERY

## 2021-09-09 NOTE — PROGRESS NOTES
FOLLOW-UP VISIT      The patient returns for follow-up s/p left total knee replacement    Date of Surgery    8/16/2021    Wound Status     Incisions are healing well with no surrounding erythema, and minimal ecchymosis. Exam    She has no signs of infection DVT. Range of motion 0 to 120 degrees. X-rays 2 views left knee reveals excellent alignment of position of all total knee components.     Plan    Slow return to normal activity    Follow-up Appointment    4 weeks

## 2021-09-14 ENCOUNTER — CARE COORDINATION (OUTPATIENT)
Dept: CASE MANAGEMENT | Age: 86
End: 2021-09-14

## 2021-09-20 ENCOUNTER — CARE COORDINATION (OUTPATIENT)
Dept: CASE MANAGEMENT | Age: 86
End: 2021-09-20

## 2021-09-20 NOTE — CARE COORDINATION
Called patient for updates. Unable to leave a message as voicemail is full. Will continue to follow.   Armand Deleon BSN  Care Transition Nurse for ortho bundle  [phone]

## 2021-09-27 ENCOUNTER — CARE COORDINATION (OUTPATIENT)
Dept: CASE MANAGEMENT | Age: 86
End: 2021-09-27

## 2021-09-27 NOTE — CARE COORDINATION
Spoke with patient. Incision status: States free from redness or drainage. Edema/Swelling: States swelling has improved. Pain level and status: States pain is tolerable. Use of pain medications: States not taking pain meds. Bowels: no complaints. Doing HEP.     Do you have all of your medications: Yes    Changes in medications: No    Follow up appointments:    Future Appointments   Date Time Provider Deonte Belloi   10/7/2021  1:20 PM MD ROSALEE Salmon ORTHO MIROSLAVA Loco BSN  Care Transition Nurse for ortho bundle  [phone]

## 2021-10-05 ENCOUNTER — CARE COORDINATION (OUTPATIENT)
Dept: CASE MANAGEMENT | Age: 86
End: 2021-10-05

## 2021-10-05 NOTE — CARE COORDINATION
Called patient for updates. Unable to leave a message for return call, as voicemail is full.   Ginger Santiago BSN  Care Transition Nurse for ortho bundle  [phone]

## 2021-10-20 ENCOUNTER — CARE COORDINATION (OUTPATIENT)
Dept: CASE MANAGEMENT | Age: 86
End: 2021-10-20

## 2021-10-20 NOTE — CARE COORDINATION
Called patient for updates. Unable to leave a message as voicemail is full.   Nico William BSN  Care Transition Nurse for ortho bundle  [phone]

## 2021-11-04 ENCOUNTER — CARE COORDINATION (OUTPATIENT)
Dept: CASE MANAGEMENT | Age: 86
End: 2021-11-04

## 2021-11-04 NOTE — CARE COORDINATION
Called patient for updates. Unable to leave a message and no answer. Patient's bundle program and follow up phone calls are ending.   Ami Lange BSN  Care Transition Nurse for ortho bundle  [phone]

## 2023-08-23 NOTE — PROGRESS NOTES
Inpatient Physical Therapy Daily Treatment Note    Unit: [facility]  Date:  5/19/2021  Patient Name:    Gavi Quarles  Admitting diagnosis:  Osteoarthritis of right knee, unspecified osteoarthritis type [M17.11]  Admit Date:  5/17/2021  Precautions/Restrictions:  Fall risk, Bed/chair alarm and WB Restrictions (FWB RLE)   Knee immobilizer discontinued. Discharge Recommendations: Home 24 hr assist  and Home PT  DME needs for discharge: Needs Met       Therapy recommendation for EMS Transport: can transport by wheelchair    Therapy recommendations for staff:   Assist of 1 with use of rolling walker (RW) and gait belt for all transfers and ambulation to/from [facility]  to/from chair  to/from bathroom    History of Present Illness:   Elective R TKA 5/17    Home Health S4 Level Recommendation: Level 3 Safety  AM-PAC Mobility Score      AM-PAC Inpatient Mobility without Stair Climbing Raw Score : 15    Treatment Time: 4185-0582  Treatment number: 5  Timed Code Treatment Minutes: 30 minutes  Total Treatment Minutes:  30 minutes    Cognition    A&O orientation not directly assessed. Able to follow 2 step commands    Subjective  Patient lying supine in bed with family at bedside ()  Pt agreeable to this PT tx. Pain   No  Location: R knee   Rating:    NA/10  Pain Medicine Status: No request made    Bed Mobility   Supine to Sit:    SBA With HOB flat and use of bedrails. Sit to Supine:   Min A  for RLE elevation into bed, with HOB flat  Rolling:   Not Tested  Scooting:   Supervision to EOB in sitting. Transfer Training  - pt deferred due to fatigue. Sit to stand:   Not Tested   Stand to sit:   Not Tested   Bed to Chair:   Not Tested with use of N/A     Gait Training gait deferred due to fatigue; pt ambulated 0 ft. Distance:      N/A  Deviations (firm surface/linoleum):  N/A  Assistive Device Used:    N/A  Level of Assist:    N/A  Comment:     Stair Training deferred, pt trialed curb step yesterday.  Able to complete without cuing at [facility]. # of Steps:   N/A   Level of Assist:  N/A  UE Support:  NA  Assistive Device:  N/A  Pattern:   N/A  Comments: N/A    Therapeutic Exercise all completed bilaterally unless indicated  Ankle Pumps x 10 reps  Glut sets x 15 reps  Quad sets x 15 reps  Heel slides x 15 reps  SLR x 15 reps  Hip abduction: x 10 reps   Heel slides in sitting x 25 reps    Balance  Sitting:  Good ; Supervision  Comments: at EOB    Standing: Not tested; N/A  Comments:     Patient Education      Role of PT, POC, Discharge recommendations, safety awareness, transfer techniques, pacing activity and HEP. Reviewed HEP instructions to complete 3x daily at home. Reviewed positioning to address ROM. Pt and pt's  concerned about swelling in the knee. Discussed techniques to manage swelling at home. Positioning Needs       Pt in bed, alarm set, positioned in proper neutral alignment and pressure relief provided. Call light provided and all needs within reach    ROM Measurements   Knee Flexion: AROM to 45* in supine, to 80* in sitting  Knee Extension: Not measured. Activity Tolerance   Pt completed therapy session with no adverse symptoms. BP (mmHg)  HR (bpm)  SpO2 (%)    Supine before activity   105/52      EOB  120/56  82     Returned to supine  111/45  77        Other  None. Assessment :  Pt is fatigued and declines out of bed activity at this time. She completes bed level exercises and seated heel slides with noted improvement in knee flexion. BP remains low but stable with change in position supine<>sit. Will attempt OOB mobility tomorrow morning as patient tolerates. Recommending Home 24 hr assist and with home PT upon discharge as patient functioning below baseline level and would benefit from continued skilled PT. Goals (all goals ongoing unless otherwise indicated)  To be met in 3 visits:  1).  Independent with LE Ex x 10 reps - 5/19 goal met, pt able to use written HEP to complete activities indep.     To be met in 6 visits:  1). Supine to/from sit: Supervision   2). Sit to/from stand: Supervision  3). Bed to chair: Supervision  4). Gait: Ambulate 50 ft.  with  Supervision and use of rolling walker (RW)  5). Tolerate B LE exercises 3 sets of 10-15 reps  6). Ascend/descend 1 steps with SBA with use of no handrail and rolling walker (RW)    Plan   Continue with plan of care. Signature: Sin Mcguire, PT, DPT    If patient discharges from this facility prior to next visit, this note will serve as the Discharge Summary. O-T Advancement Flap Text: The defect edges were debeveled with a #15 scalpel blade.  Given the location of the defect, shape of the defect and the proximity to free margins an O-T advancement flap was deemed most appropriate.  Using a sterile surgical marker, an appropriate advancement flap was drawn incorporating the defect and placing the expected incisions within the relaxed skin tension lines where possible.    The area thus outlined was incised deep to adipose tissue with a #15 scalpel blade.  The skin margins were undermined to an appropriate distance in all directions utilizing iris scissors.

## 2024-02-01 NOTE — PROGRESS NOTES
Date of Service: 2/2/2024  Chief Complaint     Hip Pain (Right Hip Pain)      History of Present Illness:  Edna Mcconnell is a 88 y.o. female here for evaluation of right hip pain.  Here last visit with [facility] was 9/2021 after Dr. Pedroza completed her left TKA.  Here PCP is Dr. Cristy Neves.  Here current symptoms are as described below and I reviewed these with her today.  Over her right buttock.  It does radiate along the anterior thigh.  No recent falls or direct trauma.  She has had a previous lumbar spinal fusion at L4-5 in 2018.  She did have a postoperative infection that required antibiotic treatment.  She has limited benefit from her meloxicam.  She is accompanied by her daughter who is a nurse.      Pain Assessment  Location of Pain: Hip  Location Modifiers: Right, Posterior  Severity of Pain: 8  Quality of Pain: Dull  Duration of Pain: Persistent  Frequency of Pain: Constant  Date Pain First Started:  (1 year ago)  Aggravating Factors: Exercise, Walking, Standing  Limiting Behavior: Yes  Result of Injury: No  Work-Related Injury: No  Are there other pain locations you wish to document?: No    Medical History:  Current Outpatient Medications   Medication Sig Dispense Refill    acetaminophen (TYLENOL) 325 MG tablet Take 2 tablets by mouth every 6 hours as needed for Pain 120 tablet 0    vitamin E 400 UNIT capsule Take 1 capsule by mouth daily      vitamin D (CHOLECALCIFEROL) 25 MCG (1000 UT) TABS tablet Take 2 tablets by mouth daily      DULoxetine (CYMBALTA) 60 MG extended release capsule Take 1 capsule by mouth daily      meloxicam (MOBIC) 15 MG tablet Take 1 tablet by mouth daily      methIMAzole (TAPAZOLE) 5 MG tablet Take 1 tablet by mouth daily Take 1/2 tablet daily      omeprazole (PRILOSEC) 20 MG capsule Take 1 capsule by mouth daily      metoprolol (TOPROL-XL) 50 MG XL tablet Take 1 tablet by mouth daily      pravastatin (PRAVACHOL) 20 MG tablet Take 1 tablet by mouth daily      aspirin  Patients mother would like you to know that she is not able to make the 2:05pm appointment with you today due to her work schedule but she will be bringing the patient in to see Dr. Derek Drake @ 4pm (you are not available).

## 2024-02-02 ENCOUNTER — OFFICE VISIT (OUTPATIENT)
Dept: ORTHOPEDIC SURGERY | Age: 89
End: 2024-02-02

## 2024-02-02 VITALS — HEIGHT: 63 IN | BODY MASS INDEX: 31.89 KG/M2 | WEIGHT: 180 LBS

## 2024-02-02 DIAGNOSIS — M16.12 ARTHRITIS OF LEFT HIP: ICD-10-CM

## 2024-02-02 DIAGNOSIS — M16.11 ARTHRITIS OF RIGHT HIP: ICD-10-CM

## 2024-02-02 DIAGNOSIS — M25.551 RIGHT HIP PAIN: Primary | ICD-10-CM

## 2024-02-02 PROBLEM — M17.12 PRIMARY OSTEOARTHRITIS OF LEFT KNEE: Status: RESOLVED | Noted: 2021-08-11 | Resolved: 2024-02-02

## 2024-02-02 PROBLEM — M17.11 PRIMARY OSTEOARTHRITIS OF RIGHT KNEE: Status: RESOLVED | Noted: 2021-04-02 | Resolved: 2024-02-02

## 2024-02-02 PROBLEM — M17.11 OSTEOARTHRITIS OF RIGHT KNEE: Status: RESOLVED | Noted: 2021-05-17 | Resolved: 2024-02-02

## 2024-02-02 PROBLEM — Z96.651 S/P TOTAL KNEE ARTHROPLASTY, RIGHT: Status: ACTIVE | Noted: 2024-02-02

## 2025-07-15 ENCOUNTER — APPOINTMENT (OUTPATIENT)
Dept: GENERAL RADIOLOGY | Age: 89
DRG: 522 | End: 2025-07-15
Attending: HOSPITALIST
Payer: MEDICARE

## 2025-07-15 ENCOUNTER — ANESTHESIA EVENT (OUTPATIENT)
Dept: OPERATING ROOM | Age: 89
DRG: 522 | End: 2025-07-15
Payer: MEDICARE

## 2025-07-15 ENCOUNTER — HOSPITAL ENCOUNTER (INPATIENT)
Age: 89
LOS: 1 days | Discharge: ANOTHER HOSPITAL | DRG: 536 | End: 2025-07-15
Attending: STUDENT IN AN ORGANIZED HEALTH CARE EDUCATION/TRAINING PROGRAM | Admitting: STUDENT IN AN ORGANIZED HEALTH CARE EDUCATION/TRAINING PROGRAM
Payer: MEDICARE

## 2025-07-15 ENCOUNTER — ANESTHESIA (OUTPATIENT)
Dept: OPERATING ROOM | Age: 89
DRG: 522 | End: 2025-07-15
Payer: MEDICARE

## 2025-07-15 ENCOUNTER — APPOINTMENT (OUTPATIENT)
Dept: GENERAL RADIOLOGY | Age: 89
DRG: 536 | End: 2025-07-15
Attending: STUDENT IN AN ORGANIZED HEALTH CARE EDUCATION/TRAINING PROGRAM
Payer: MEDICARE

## 2025-07-15 ENCOUNTER — HOSPITAL ENCOUNTER (INPATIENT)
Age: 89
LOS: 2 days | Discharge: NURSING, REHAB OR LONG-TERM CARE | DRG: 522 | End: 2025-07-17
Attending: HOSPITALIST | Admitting: STUDENT IN AN ORGANIZED HEALTH CARE EDUCATION/TRAINING PROGRAM
Payer: MEDICARE

## 2025-07-15 DIAGNOSIS — S72.002A CLOSED FRACTURE OF NECK OF LEFT FEMUR, INITIAL ENCOUNTER (HCC): Primary | ICD-10-CM

## 2025-07-15 PROBLEM — S72.002S HIP FRACTURE REQUIRING OPERATIVE REPAIR, LEFT, SEQUELA: Status: ACTIVE | Noted: 2025-07-15

## 2025-07-15 LAB
25(OH)D3 SERPL-MCNC: 29.3 NG/ML
ABO/RH: NORMAL
ABO/RH: NORMAL
ANION GAP SERPL CALCULATED.3IONS-SCNC: 10 MMOL/L (ref 3–16)
ANTIBODY SCREEN: NORMAL
ANTIBODY SCREEN: NORMAL
BUN SERPL-MCNC: 23 MG/DL (ref 7–20)
CALCIUM SERPL-MCNC: 9 MG/DL (ref 8.3–10.6)
CHLORIDE SERPL-SCNC: 104 MMOL/L (ref 99–110)
CO2 SERPL-SCNC: 27 MMOL/L (ref 21–32)
CREAT SERPL-MCNC: 1.3 MG/DL (ref 0.6–1.2)
DEPRECATED RDW RBC AUTO: 13.2 % (ref 12.4–15.4)
GFR SERPLBLD CREATININE-BSD FMLA CKD-EPI: 39 ML/MIN/{1.73_M2}
GLUCOSE BLD-MCNC: 92 MG/DL (ref 70–99)
GLUCOSE SERPL-MCNC: 128 MG/DL (ref 70–99)
HCT VFR BLD AUTO: 36.4 % (ref 36–48)
HGB BLD-MCNC: 12.5 G/DL (ref 12–16)
INR PPP: 1.11 (ref 0.86–1.14)
MAGNESIUM SERPL-MCNC: 2 MG/DL (ref 1.8–2.4)
MCH RBC QN AUTO: 31.4 PG (ref 26–34)
MCHC RBC AUTO-ENTMCNC: 34.4 G/DL (ref 31–36)
MCV RBC AUTO: 91.4 FL (ref 80–100)
PERFORMED ON: NORMAL
PHOSPHATE SERPL-MCNC: 3.7 MG/DL (ref 2.5–4.9)
PLATELET # BLD AUTO: 201 K/UL (ref 135–450)
PMV BLD AUTO: 9.1 FL (ref 5–10.5)
POTASSIUM SERPL-SCNC: 4.3 MMOL/L (ref 3.5–5.1)
PROTHROMBIN TIME: 14.5 SEC (ref 12.1–14.9)
RBC # BLD AUTO: 3.99 M/UL (ref 4–5.2)
SODIUM SERPL-SCNC: 141 MMOL/L (ref 136–145)
WBC # BLD AUTO: 11.8 K/UL (ref 4–11)

## 2025-07-15 PROCEDURE — 6370000000 HC RX 637 (ALT 250 FOR IP): Performed by: STUDENT IN AN ORGANIZED HEALTH CARE EDUCATION/TRAINING PROGRAM

## 2025-07-15 PROCEDURE — 2500000003 HC RX 250 WO HCPCS: Performed by: ORTHOPAEDIC SURGERY

## 2025-07-15 PROCEDURE — 2580000003 HC RX 258: Performed by: NURSE ANESTHETIST, CERTIFIED REGISTERED

## 2025-07-15 PROCEDURE — 73502 X-RAY EXAM HIP UNI 2-3 VIEWS: CPT

## 2025-07-15 PROCEDURE — 3600000015 HC SURGERY LEVEL 5 ADDTL 15MIN: Performed by: ORTHOPAEDIC SURGERY

## 2025-07-15 PROCEDURE — 36415 COLL VENOUS BLD VENIPUNCTURE: CPT

## 2025-07-15 PROCEDURE — 86901 BLOOD TYPING SEROLOGIC RH(D): CPT

## 2025-07-15 PROCEDURE — 3700000000 HC ANESTHESIA ATTENDED CARE: Performed by: ORTHOPAEDIC SURGERY

## 2025-07-15 PROCEDURE — 2580000003 HC RX 258: Performed by: ORTHOPAEDIC SURGERY

## 2025-07-15 PROCEDURE — 6360000002 HC RX W HCPCS: Performed by: NURSE ANESTHETIST, CERTIFIED REGISTERED

## 2025-07-15 PROCEDURE — 6360000002 HC RX W HCPCS: Performed by: STUDENT IN AN ORGANIZED HEALTH CARE EDUCATION/TRAINING PROGRAM

## 2025-07-15 PROCEDURE — 85027 COMPLETE CBC AUTOMATED: CPT

## 2025-07-15 PROCEDURE — 83735 ASSAY OF MAGNESIUM: CPT

## 2025-07-15 PROCEDURE — 3700000001 HC ADD 15 MINUTES (ANESTHESIA): Performed by: ORTHOPAEDIC SURGERY

## 2025-07-15 PROCEDURE — 82306 VITAMIN D 25 HYDROXY: CPT

## 2025-07-15 PROCEDURE — 2720000010 HC SURG SUPPLY STERILE: Performed by: ORTHOPAEDIC SURGERY

## 2025-07-15 PROCEDURE — 3600000005 HC SURGERY LEVEL 5 BASE: Performed by: ORTHOPAEDIC SURGERY

## 2025-07-15 PROCEDURE — 86900 BLOOD TYPING SEROLOGIC ABO: CPT

## 2025-07-15 PROCEDURE — 84100 ASSAY OF PHOSPHORUS: CPT

## 2025-07-15 PROCEDURE — 86850 RBC ANTIBODY SCREEN: CPT

## 2025-07-15 PROCEDURE — 2709999900 HC NON-CHARGEABLE SUPPLY: Performed by: ORTHOPAEDIC SURGERY

## 2025-07-15 PROCEDURE — 6360000002 HC RX W HCPCS: Performed by: ORTHOPAEDIC SURGERY

## 2025-07-15 PROCEDURE — 80048 BASIC METABOLIC PNL TOTAL CA: CPT

## 2025-07-15 PROCEDURE — 7100000000 HC PACU RECOVERY - FIRST 15 MIN: Performed by: ORTHOPAEDIC SURGERY

## 2025-07-15 PROCEDURE — 73501 X-RAY EXAM HIP UNI 1 VIEW: CPT

## 2025-07-15 PROCEDURE — 72170 X-RAY EXAM OF PELVIS: CPT

## 2025-07-15 PROCEDURE — 85610 PROTHROMBIN TIME: CPT

## 2025-07-15 PROCEDURE — 7100000001 HC PACU RECOVERY - ADDTL 15 MIN: Performed by: ORTHOPAEDIC SURGERY

## 2025-07-15 PROCEDURE — 2700000000 HC OXYGEN THERAPY PER DAY

## 2025-07-15 PROCEDURE — 6370000000 HC RX 637 (ALT 250 FOR IP): Performed by: ORTHOPAEDIC SURGERY

## 2025-07-15 PROCEDURE — 2500000003 HC RX 250 WO HCPCS: Performed by: NURSE ANESTHETIST, CERTIFIED REGISTERED

## 2025-07-15 PROCEDURE — C1776 JOINT DEVICE (IMPLANTABLE): HCPCS | Performed by: ORTHOPAEDIC SURGERY

## 2025-07-15 PROCEDURE — 0SRB04A REPLACEMENT OF LEFT HIP JOINT WITH CERAMIC ON POLYETHYLENE SYNTHETIC SUBSTITUTE, UNCEMENTED, OPEN APPROACH: ICD-10-PCS | Performed by: ORTHOPAEDIC SURGERY

## 2025-07-15 PROCEDURE — 94761 N-INVAS EAR/PLS OXIMETRY MLT: CPT

## 2025-07-15 PROCEDURE — 1200000000 HC SEMI PRIVATE

## 2025-07-15 DEVICE — ACTIS DUOFIX HIP PROSTHESIS (FEMORAL STEM 12/14 TAPER CEMENTLESS SIZE 10 STD COLLAR)  CE
Type: IMPLANTABLE DEVICE | Site: HIP | Status: FUNCTIONAL
Brand: ACTIS

## 2025-07-15 DEVICE — SHELL ACET CMNTLS 48 MM 3 HOLE STRL EMPHASYS LTX: Type: IMPLANTABLE DEVICE | Site: HIP | Status: FUNCTIONAL

## 2025-07-15 DEVICE — CABLE SURG L750MM DIA1.7MM CERCLAGE CO CHROM SMOOTH W/ CRMP: Type: IMPLANTABLE DEVICE | Site: HIP | Status: FUNCTIONAL

## 2025-07-15 DEVICE — BIOLOX DELTA CERAMIC FEMORAL HEAD +1.5 36MM DIA 12/14 TAPER
Type: IMPLANTABLE DEVICE | Site: HIP | Status: FUNCTIONAL
Brand: BIOLOX DELTA

## 2025-07-15 DEVICE — IMPLANTABLE DEVICE: Type: IMPLANTABLE DEVICE | Site: HIP | Status: FUNCTIONAL

## 2025-07-15 RX ORDER — SODIUM CHLORIDE 0.9 % (FLUSH) 0.9 %
5-40 SYRINGE (ML) INJECTION EVERY 12 HOURS SCHEDULED
Status: DISCONTINUED | OUTPATIENT
Start: 2025-07-15 | End: 2025-07-15 | Stop reason: HOSPADM

## 2025-07-15 RX ORDER — DOXYCYCLINE 100 MG/1
100 CAPSULE ORAL 2 TIMES DAILY
Status: ON HOLD | COMMUNITY
Start: 2025-07-09 | End: 2025-07-19

## 2025-07-15 RX ORDER — LIDOCAINE HYDROCHLORIDE 20 MG/ML
INJECTION, SOLUTION EPIDURAL; INFILTRATION; INTRACAUDAL; PERINEURAL
Status: DISCONTINUED | OUTPATIENT
Start: 2025-07-15 | End: 2025-07-15 | Stop reason: SDUPTHER

## 2025-07-15 RX ORDER — HYDROMORPHONE HYDROCHLORIDE 1 MG/ML
0.5 INJECTION, SOLUTION INTRAMUSCULAR; INTRAVENOUS; SUBCUTANEOUS EVERY 4 HOURS PRN
Status: DISCONTINUED | OUTPATIENT
Start: 2025-07-15 | End: 2025-07-15 | Stop reason: HOSPADM

## 2025-07-15 RX ORDER — PROPOFOL 10 MG/ML
INJECTION, EMULSION INTRAVENOUS
Status: DISCONTINUED | OUTPATIENT
Start: 2025-07-15 | End: 2025-07-15 | Stop reason: SDUPTHER

## 2025-07-15 RX ORDER — DOXYCYCLINE 100 MG/1
100 CAPSULE ORAL 2 TIMES DAILY
Status: ON HOLD | COMMUNITY
End: 2025-07-15 | Stop reason: HOSPADM

## 2025-07-15 RX ORDER — PRAVASTATIN SODIUM 20 MG
20 TABLET ORAL DAILY
Status: DISCONTINUED | OUTPATIENT
Start: 2025-07-16 | End: 2025-07-17 | Stop reason: HOSPADM

## 2025-07-15 RX ORDER — PANTOPRAZOLE SODIUM 40 MG/1
40 TABLET, DELAYED RELEASE ORAL
Status: DISCONTINUED | OUTPATIENT
Start: 2025-07-15 | End: 2025-07-15 | Stop reason: HOSPADM

## 2025-07-15 RX ORDER — DEXAMETHASONE SODIUM PHOSPHATE 4 MG/ML
INJECTION, SOLUTION INTRA-ARTICULAR; INTRALESIONAL; INTRAMUSCULAR; INTRAVENOUS; SOFT TISSUE
Status: DISCONTINUED | OUTPATIENT
Start: 2025-07-15 | End: 2025-07-15 | Stop reason: SDUPTHER

## 2025-07-15 RX ORDER — SODIUM CHLORIDE 0.9 % (FLUSH) 0.9 %
5-40 SYRINGE (ML) INJECTION PRN
Status: DISCONTINUED | OUTPATIENT
Start: 2025-07-15 | End: 2025-07-17 | Stop reason: HOSPADM

## 2025-07-15 RX ORDER — GLUCAGON 1 MG/ML
1 KIT INJECTION PRN
Status: DISCONTINUED | OUTPATIENT
Start: 2025-07-15 | End: 2025-07-15 | Stop reason: HOSPADM

## 2025-07-15 RX ORDER — SODIUM CHLORIDE 9 MG/ML
INJECTION, SOLUTION INTRAVENOUS PRN
Status: DISCONTINUED | OUTPATIENT
Start: 2025-07-15 | End: 2025-07-17 | Stop reason: HOSPADM

## 2025-07-15 RX ORDER — SODIUM CHLORIDE 0.9 % (FLUSH) 0.9 %
5-40 SYRINGE (ML) INJECTION PRN
Status: DISCONTINUED | OUTPATIENT
Start: 2025-07-15 | End: 2025-07-15 | Stop reason: HOSPADM

## 2025-07-15 RX ORDER — METOPROLOL SUCCINATE 50 MG/1
50 TABLET, EXTENDED RELEASE ORAL DAILY
Status: DISCONTINUED | OUTPATIENT
Start: 2025-07-16 | End: 2025-07-17 | Stop reason: HOSPADM

## 2025-07-15 RX ORDER — DULOXETIN HYDROCHLORIDE 60 MG/1
60 CAPSULE, DELAYED RELEASE ORAL DAILY
Status: DISCONTINUED | OUTPATIENT
Start: 2025-07-15 | End: 2025-07-15 | Stop reason: HOSPADM

## 2025-07-15 RX ORDER — FENTANYL CITRATE 50 UG/ML
50 INJECTION, SOLUTION INTRAMUSCULAR; INTRAVENOUS EVERY 5 MIN PRN
Status: DISCONTINUED | OUTPATIENT
Start: 2025-07-15 | End: 2025-07-15 | Stop reason: HOSPADM

## 2025-07-15 RX ORDER — POLYETHYLENE GLYCOL 3350 17 G/17G
17 POWDER, FOR SOLUTION ORAL DAILY PRN
Status: DISCONTINUED | OUTPATIENT
Start: 2025-07-15 | End: 2025-07-15 | Stop reason: HOSPADM

## 2025-07-15 RX ORDER — FUROSEMIDE 20 MG/1
20 TABLET ORAL DAILY
Status: DISCONTINUED | OUTPATIENT
Start: 2025-07-15 | End: 2025-07-17 | Stop reason: HOSPADM

## 2025-07-15 RX ORDER — SODIUM CHLORIDE 0.9 % (FLUSH) 0.9 %
5-40 SYRINGE (ML) INJECTION EVERY 12 HOURS SCHEDULED
Status: DISCONTINUED | OUTPATIENT
Start: 2025-07-15 | End: 2025-07-17 | Stop reason: HOSPADM

## 2025-07-15 RX ORDER — PHENYLEPHRINE HYDROCHLORIDE 10 MG/ML
INJECTION INTRAVENOUS
Status: DISCONTINUED | OUTPATIENT
Start: 2025-07-15 | End: 2025-07-15 | Stop reason: SDUPTHER

## 2025-07-15 RX ORDER — FENTANYL CITRATE 50 UG/ML
INJECTION, SOLUTION INTRAMUSCULAR; INTRAVENOUS
Status: DISCONTINUED | OUTPATIENT
Start: 2025-07-15 | End: 2025-07-15 | Stop reason: SDUPTHER

## 2025-07-15 RX ORDER — ACETAMINOPHEN 325 MG/1
650 TABLET ORAL EVERY 6 HOURS PRN
Status: DISCONTINUED | OUTPATIENT
Start: 2025-07-15 | End: 2025-07-15 | Stop reason: HOSPADM

## 2025-07-15 RX ORDER — TRAMADOL HYDROCHLORIDE 50 MG/1
50 TABLET ORAL DAILY
Status: ON HOLD | COMMUNITY
End: 2025-07-16 | Stop reason: HOSPADM

## 2025-07-15 RX ORDER — DIPHENHYDRAMINE HYDROCHLORIDE 50 MG/ML
12.5 INJECTION, SOLUTION INTRAMUSCULAR; INTRAVENOUS
Status: DISCONTINUED | OUTPATIENT
Start: 2025-07-15 | End: 2025-07-15 | Stop reason: HOSPADM

## 2025-07-15 RX ORDER — TRANEXAMIC ACID 650 MG/1
1950 TABLET ORAL ONCE
Status: DISCONTINUED | OUTPATIENT
Start: 2025-07-15 | End: 2025-07-15

## 2025-07-15 RX ORDER — ROCURONIUM BROMIDE 10 MG/ML
INJECTION, SOLUTION INTRAVENOUS
Status: DISCONTINUED | OUTPATIENT
Start: 2025-07-15 | End: 2025-07-15 | Stop reason: SDUPTHER

## 2025-07-15 RX ORDER — MAGNESIUM SULFATE IN WATER 40 MG/ML
2000 INJECTION, SOLUTION INTRAVENOUS PRN
Status: DISCONTINUED | OUTPATIENT
Start: 2025-07-15 | End: 2025-07-15 | Stop reason: HOSPADM

## 2025-07-15 RX ORDER — SODIUM CHLORIDE 9 MG/ML
INJECTION, SOLUTION INTRAVENOUS PRN
Status: DISCONTINUED | OUTPATIENT
Start: 2025-07-15 | End: 2025-07-15 | Stop reason: HOSPADM

## 2025-07-15 RX ORDER — PRAVASTATIN SODIUM 20 MG
20 TABLET ORAL DAILY
Status: DISCONTINUED | OUTPATIENT
Start: 2025-07-15 | End: 2025-07-15 | Stop reason: HOSPADM

## 2025-07-15 RX ORDER — ENOXAPARIN SODIUM 100 MG/ML
30 INJECTION SUBCUTANEOUS NIGHTLY
Status: DISCONTINUED | OUTPATIENT
Start: 2025-07-15 | End: 2025-07-17 | Stop reason: HOSPADM

## 2025-07-15 RX ORDER — HALOPERIDOL 5 MG/ML
1 INJECTION INTRAMUSCULAR
Status: DISCONTINUED | OUTPATIENT
Start: 2025-07-15 | End: 2025-07-15 | Stop reason: HOSPADM

## 2025-07-15 RX ORDER — HYDROCODONE BITARTRATE AND ACETAMINOPHEN 5; 325 MG/1; MG/1
1 TABLET ORAL EVERY 4 HOURS PRN
Status: DISCONTINUED | OUTPATIENT
Start: 2025-07-15 | End: 2025-07-17 | Stop reason: HOSPADM

## 2025-07-15 RX ORDER — ONDANSETRON 2 MG/ML
INJECTION INTRAMUSCULAR; INTRAVENOUS
Status: DISCONTINUED | OUTPATIENT
Start: 2025-07-15 | End: 2025-07-15 | Stop reason: SDUPTHER

## 2025-07-15 RX ORDER — POTASSIUM CHLORIDE 1500 MG/1
40 TABLET, EXTENDED RELEASE ORAL PRN
Status: DISCONTINUED | OUTPATIENT
Start: 2025-07-15 | End: 2025-07-15 | Stop reason: HOSPADM

## 2025-07-15 RX ORDER — POTASSIUM CHLORIDE 7.45 MG/ML
10 INJECTION INTRAVENOUS PRN
Status: DISCONTINUED | OUTPATIENT
Start: 2025-07-15 | End: 2025-07-15 | Stop reason: HOSPADM

## 2025-07-15 RX ORDER — DEXTROSE MONOHYDRATE 100 MG/ML
INJECTION, SOLUTION INTRAVENOUS CONTINUOUS PRN
Status: DISCONTINUED | OUTPATIENT
Start: 2025-07-15 | End: 2025-07-15 | Stop reason: HOSPADM

## 2025-07-15 RX ORDER — SODIUM CHLORIDE, SODIUM LACTATE, POTASSIUM CHLORIDE, CALCIUM CHLORIDE 600; 310; 30; 20 MG/100ML; MG/100ML; MG/100ML; MG/100ML
INJECTION, SOLUTION INTRAVENOUS CONTINUOUS
Status: DISCONTINUED | OUTPATIENT
Start: 2025-07-15 | End: 2025-07-15 | Stop reason: HOSPADM

## 2025-07-15 RX ORDER — ONDANSETRON 2 MG/ML
4 INJECTION INTRAMUSCULAR; INTRAVENOUS
Status: DISCONTINUED | OUTPATIENT
Start: 2025-07-15 | End: 2025-07-15 | Stop reason: HOSPADM

## 2025-07-15 RX ORDER — LORAZEPAM 2 MG/ML
0.5 INJECTION INTRAMUSCULAR
Status: DISCONTINUED | OUTPATIENT
Start: 2025-07-15 | End: 2025-07-15 | Stop reason: HOSPADM

## 2025-07-15 RX ORDER — ACETAMINOPHEN 650 MG/1
650 SUPPOSITORY RECTAL EVERY 6 HOURS PRN
Status: DISCONTINUED | OUTPATIENT
Start: 2025-07-15 | End: 2025-07-15 | Stop reason: HOSPADM

## 2025-07-15 RX ORDER — ONDANSETRON 4 MG/1
4 TABLET, ORALLY DISINTEGRATING ORAL EVERY 8 HOURS PRN
Status: DISCONTINUED | OUTPATIENT
Start: 2025-07-15 | End: 2025-07-15 | Stop reason: HOSPADM

## 2025-07-15 RX ORDER — SUCCINYLCHOLINE/SOD CL,ISO/PF 200MG/10ML
SYRINGE (ML) INTRAVENOUS
Status: DISCONTINUED | OUTPATIENT
Start: 2025-07-15 | End: 2025-07-15 | Stop reason: SDUPTHER

## 2025-07-15 RX ORDER — PANTOPRAZOLE SODIUM 40 MG/1
40 TABLET, DELAYED RELEASE ORAL
Status: DISCONTINUED | OUTPATIENT
Start: 2025-07-16 | End: 2025-07-17 | Stop reason: HOSPADM

## 2025-07-15 RX ORDER — SODIUM CHLORIDE 9 MG/ML
INJECTION, SOLUTION INTRAVENOUS
Status: DISCONTINUED | OUTPATIENT
Start: 2025-07-15 | End: 2025-07-15 | Stop reason: SDUPTHER

## 2025-07-15 RX ORDER — ONDANSETRON 2 MG/ML
4 INJECTION INTRAMUSCULAR; INTRAVENOUS EVERY 6 HOURS PRN
Status: DISCONTINUED | OUTPATIENT
Start: 2025-07-15 | End: 2025-07-15 | Stop reason: HOSPADM

## 2025-07-15 RX ORDER — FUROSEMIDE 20 MG/1
20 TABLET ORAL DAILY
Status: ON HOLD | COMMUNITY

## 2025-07-15 RX ORDER — DULOXETIN HYDROCHLORIDE 60 MG/1
60 CAPSULE, DELAYED RELEASE ORAL DAILY
Status: DISCONTINUED | OUTPATIENT
Start: 2025-07-16 | End: 2025-07-17 | Stop reason: HOSPADM

## 2025-07-15 RX ORDER — ENOXAPARIN SODIUM 100 MG/ML
40 INJECTION SUBCUTANEOUS DAILY
Status: DISCONTINUED | OUTPATIENT
Start: 2025-07-15 | End: 2025-07-15 | Stop reason: HOSPADM

## 2025-07-15 RX ORDER — TRANEXAMIC ACID 650 MG/1
1950 TABLET ORAL ONCE
Status: COMPLETED | OUTPATIENT
Start: 2025-07-15 | End: 2025-07-15

## 2025-07-15 RX ORDER — METOPROLOL SUCCINATE 50 MG/1
50 TABLET, EXTENDED RELEASE ORAL DAILY
Status: DISCONTINUED | OUTPATIENT
Start: 2025-07-15 | End: 2025-07-15 | Stop reason: HOSPADM

## 2025-07-15 RX ORDER — FUROSEMIDE 20 MG/1
20 TABLET ORAL DAILY
Status: ON HOLD | COMMUNITY
End: 2025-07-15 | Stop reason: HOSPADM

## 2025-07-15 RX ORDER — MAGNESIUM HYDROXIDE 1200 MG/15ML
LIQUID ORAL CONTINUOUS PRN
Status: COMPLETED | OUTPATIENT
Start: 2025-07-15 | End: 2025-07-15

## 2025-07-15 RX ADMIN — PHENYLEPHRINE HYDROCHLORIDE 50 MCG: 10 INJECTION INTRAVENOUS at 14:26

## 2025-07-15 RX ADMIN — SODIUM CHLORIDE 2000 MG: 9 INJECTION, SOLUTION INTRAVENOUS at 14:30

## 2025-07-15 RX ADMIN — WATER 2000 MG: 1 INJECTION INTRAMUSCULAR; INTRAVENOUS; SUBCUTANEOUS at 20:41

## 2025-07-15 RX ADMIN — METOPROLOL SUCCINATE 50 MG: 50 TABLET, EXTENDED RELEASE ORAL at 09:20

## 2025-07-15 RX ADMIN — PROPOFOL 50 MG: 10 INJECTION, EMULSION INTRAVENOUS at 14:26

## 2025-07-15 RX ADMIN — HYDROMORPHONE HYDROCHLORIDE 0.5 MG: 1 INJECTION, SOLUTION INTRAMUSCULAR; INTRAVENOUS; SUBCUTANEOUS at 10:01

## 2025-07-15 RX ADMIN — DEXAMETHASONE SODIUM PHOSPHATE 4 MG: 4 INJECTION, SOLUTION INTRAMUSCULAR; INTRAVENOUS at 14:34

## 2025-07-15 RX ADMIN — PHENYLEPHRINE HYDROCHLORIDE 100 MCG: 10 INJECTION INTRAVENOUS at 15:30

## 2025-07-15 RX ADMIN — SODIUM CHLORIDE, PRESERVATIVE FREE 10 ML: 5 INJECTION INTRAVENOUS at 20:41

## 2025-07-15 RX ADMIN — SODIUM CHLORIDE: 9 INJECTION, SOLUTION INTRAVENOUS at 14:13

## 2025-07-15 RX ADMIN — PHENYLEPHRINE HYDROCHLORIDE 50 MCG: 10 INJECTION INTRAVENOUS at 15:05

## 2025-07-15 RX ADMIN — ONDANSETRON 4 MG: 2 INJECTION INTRAMUSCULAR; INTRAVENOUS at 14:34

## 2025-07-15 RX ADMIN — FUROSEMIDE 20 MG: 20 TABLET ORAL at 18:35

## 2025-07-15 RX ADMIN — PHENYLEPHRINE HYDROCHLORIDE 100 MCG: 10 INJECTION INTRAVENOUS at 14:30

## 2025-07-15 RX ADMIN — ROCURONIUM BROMIDE 5 MG: 10 SOLUTION INTRAVENOUS at 14:26

## 2025-07-15 RX ADMIN — Medication 120 MG: at 14:27

## 2025-07-15 RX ADMIN — PHENYLEPHRINE HYDROCHLORIDE 40 MCG/MIN: 10 INJECTION INTRAVENOUS at 14:31

## 2025-07-15 RX ADMIN — Medication 45 MG: at 14:34

## 2025-07-15 RX ADMIN — LIDOCAINE HYDROCHLORIDE 60 MG: 20 INJECTION, SOLUTION EPIDURAL; INFILTRATION; INTRACAUDAL; PERINEURAL at 14:26

## 2025-07-15 RX ADMIN — FENTANYL CITRATE 25 MCG: 50 INJECTION INTRAMUSCULAR; INTRAVENOUS at 14:26

## 2025-07-15 RX ADMIN — TRANEXAMIC ACID 1950 MG: 650 TABLET ORAL at 14:09

## 2025-07-15 RX ADMIN — ENOXAPARIN SODIUM 30 MG: 100 INJECTION SUBCUTANEOUS at 20:41

## 2025-07-15 ASSESSMENT — PAIN - FUNCTIONAL ASSESSMENT
PAIN_FUNCTIONAL_ASSESSMENT: PREVENTS OR INTERFERES SOME ACTIVE ACTIVITIES AND ADLS
PAIN_FUNCTIONAL_ASSESSMENT: PREVENTS OR INTERFERES SOME ACTIVE ACTIVITIES AND ADLS
PAIN_FUNCTIONAL_ASSESSMENT: 0-10

## 2025-07-15 ASSESSMENT — PAIN SCALES - GENERAL
PAINLEVEL_OUTOF10: 0
PAINLEVEL_OUTOF10: 3
PAINLEVEL_OUTOF10: 0
PAINLEVEL_OUTOF10: 7
PAINLEVEL_OUTOF10: 6

## 2025-07-15 ASSESSMENT — PAIN DESCRIPTION - FREQUENCY: FREQUENCY: CONTINUOUS

## 2025-07-15 ASSESSMENT — LIFESTYLE VARIABLES
HOW OFTEN DO YOU HAVE A DRINK CONTAINING ALCOHOL: NEVER
HOW MANY STANDARD DRINKS CONTAINING ALCOHOL DO YOU HAVE ON A TYPICAL DAY: PATIENT DOES NOT DRINK

## 2025-07-15 ASSESSMENT — PAIN DESCRIPTION - PAIN TYPE: TYPE: SURGICAL PAIN

## 2025-07-15 ASSESSMENT — PAIN DESCRIPTION - ORIENTATION
ORIENTATION: LEFT
ORIENTATION: LEFT

## 2025-07-15 ASSESSMENT — PAIN DESCRIPTION - ONSET: ONSET: ON-GOING

## 2025-07-15 ASSESSMENT — PAIN DESCRIPTION - LOCATION
LOCATION: HIP
LOCATION: HIP

## 2025-07-15 ASSESSMENT — PAIN DESCRIPTION - DESCRIPTORS
DESCRIPTORS: ACHING
DESCRIPTORS: THROBBING;SHOOTING

## 2025-07-15 NOTE — CONSULTS
[facility] Orthopedic Surgery  Consult Note    This patient is seen in consultation at the request of Dr Garza    Reason for Consult:  left hip fracture    CHIEF COMPLAINT:  left hip pain    History Obtained From:  patient, electronic medical record    HISTORY OF PRESENT ILLNESS:    The patient is a 89 y.o. female who presents with left hip pain. She states she fell at home last evening. She had severe left hip pain and was brought to ER in [facility]. She was moved to [facility] but no surgeon available so moved to [facility]. The on call ortho surgeon requested transfer to [facility] as no OR time available in [place]. She arrives today alert and oriented and pleasant. Family at bedside. . Pain is described in left hip and with the intensity of mild at rest and severe with activity of left leg.. Pain is described as aching, shooting. Discomfort is intermittent. Mild right hip pain as well but reports this is chronic. NO other complaints.     Past Medical History:        Diagnosis Date    Allergic rhinitis     Anxiety     Arthritis     Asthma     Back pain     GERD (gastroesophageal reflux disease)     Goiter     Hyperlipidemia     Hypertension     MRSA (methicillin resistant staph aureus) culture positive     CARRINGTON (obstructive sleep apnea)     Rash     non-cancerous moles removed    Thyroid disease        Past Surgical History:        Procedure Laterality Date    BREAST SURGERY      CATARACT REMOVAL      CHOLECYSTECTOMY      DIAGNOSTIC CARDIAC CATH LAB PROCEDURE      DILATION AND CURETTAGE OF UTERUS      EYE SURGERY      HYSTERECTOMY (CERVIX STATUS UNKNOWN)      KNEE SURGERY      LUMBAR FUSION  08/2018    ROTATOR CUFF REPAIR      TOTAL KNEE ARTHROPLASTY Right 05/17/2021    RIGHT TOTAL KNEE REPLACEMENT performed by Ken Pedroza MD at [facility] OR    TOTAL KNEE ARTHROPLASTY Left 08/16/2021    LEFT TOTAL KNEE REPLACEMENT performed by Ken Pedroza MD at [facility] OR    WRIST SURGERY         Social History

## 2025-07-15 NOTE — PLAN OF CARE
Problem: Discharge Planning  Goal: Discharge to home or other facility with appropriate resources  Outcome: Progressing  Flowsheets  Taken 7/15/2025 0915 by Sofia Parada RN  Discharge to home or other facility with appropriate resources: Identify barriers to discharge with patient and caregiver  Taken 7/15/2025 0546 by Rosy Kelly, RN  Discharge to home or other facility with appropriate resources:   Identify barriers to discharge with patient and caregiver   Arrange for needed discharge resources and transportation as appropriate     Problem: Pain  Goal: Verbalizes/displays adequate comfort level or baseline comfort level  Outcome: Progressing     Problem: Safety - Adult  Goal: Free from fall injury  Outcome: Progressing     Problem: ABCDS Injury Assessment  Goal: Absence of physical injury  Outcome: Progressing     Problem: Skin/Tissue Integrity  Goal: Skin integrity remains intact  Description: 1.  Monitor for areas of redness and/or skin breakdown  2.  Assess vascular access sites hourly  3.  Every 4-6 hours minimum:  Change oxygen saturation probe site  4.  Every 4-6 hours:  If on nasal continuous positive airway pressure, respiratory therapy assess nares and determine need for appliance change or resting period  Outcome: Progressing

## 2025-07-15 NOTE — PROGRESS NOTES
0530: Admission and assessment complete, pt DA from [facility], fell on step in her garage, pt from home with , pt daughter and grandson at the bedside, pt A/O x4, forgetful at times with history questions, L hip pain, LLE slightly shortened, pain with movement, rating pain 7/10, pre-existing abrasions to VEENA LE not related to pt fall, VSS, pt requiring 3L O2, RA baseline, sandhu catheter in place, was placed at [facility], pt has hx of heart murmur, heard on auscultation, oriented pt to the room and call light, kept NPO at this time, message sent to Hospitalist for admission, fall precautions in place, no other needs at this time.

## 2025-07-15 NOTE — ANESTHESIA PRE PROCEDURE
weight on file to calculate BMI.    CBC:   Lab Results   Component Value Date/Time    WBC 11.8 07/15/2025 08:41 AM    RBC 3.99 07/15/2025 08:41 AM    HGB 12.5 07/15/2025 08:41 AM    HCT 36.4 07/15/2025 08:41 AM    MCV 91.4 07/15/2025 08:41 AM    RDW 13.2 07/15/2025 08:41 AM     07/15/2025 08:41 AM       CMP:   Lab Results   Component Value Date/Time     07/15/2025 08:41 AM    K 4.3 07/15/2025 08:41 AM     07/15/2025 08:41 AM    CO2 27 07/15/2025 08:41 AM    BUN 23 07/15/2025 08:41 AM    CREATININE 1.3 07/15/2025 08:41 AM    GFRAA >60 06/29/2021 10:40 AM    LABGLOM 39 07/15/2025 08:41 AM    GLUCOSE 128 07/15/2025 08:41 AM    CALCIUM 9.0 07/15/2025 08:41 AM       POC Tests: No results for input(s): \"POCGLU\", \"POCNA\", \"POCK\", \"POCCL\", \"POCBUN\", \"POCHEMO\", \"POCHCT\" in the last 72 hours.    Coags:   Lab Results   Component Value Date/Time    PROTIME 14.5 07/15/2025 09:48 AM    INR 1.11 07/15/2025 09:48 AM    APTT 32.3 06/29/2021 10:40 AM       HCG (If Applicable): No results found for: \"PREGTESTUR\", \"PREGSERUM\", \"HCG\", \"HCGQUANT\"     ABGs: No results found for: \"PHART\", \"PO2ART\", \"KDE6RSS\", \"KVR2SRN\", \"BEART\", \"J8RWFSWQ\"     Type & Screen (If Applicable):  Lab Results   Component Value Date    ABORH O POS 07/15/2025    LABANTI NEG 07/15/2025       Drug/Infectious Status (If Applicable):  No results found for: \"HIV\", \"HEPCAB\"    COVID-19 Screening (If Applicable):   Lab Results   Component Value Date/Time    COVID19 Not Detected 05/11/2021 11:30 AM           Anesthesia Evaluation  Patient summary reviewed  Airway: Mallampati: II  TM distance: >3 FB   Neck ROM: full  Mouth opening: > = 3 FB   Dental:    (+) upper dentures      Pulmonary:   (+)     sleep apnea:                                  Cardiovascular:    (+) hypertension:, valvular problems/murmurs (mod as, solomon 1.0 CM2, ef 65%): AS, hyperlipidemia    (-) past MI, CABG/stent and  angina             ROS comment:  [facility] Ce Name:

## 2025-07-15 NOTE — OP NOTE
Patient: Edna Mcconnell  YOB: 1935  MRN: [id number]    Date of Procedure: 7/15/2025      Pre-Op Diagnosis:    Acute displaced left femoral neck fracture  Left hip osteoarthritis     Post-Op Diagnosis: Same       Procedure Performed: Left anterior total hip arthroplasty     Surgeon: Lyle Castle MD     Physician Assistant: BRYSON Burdick     Anesthesia: General     Estimated Blood Loss: 200 mL      Complications: None    Implants:  Depuy Emphasys cup, 48 mm  36 mm polyethylene liner, +4 mm offset  Depuy Actis stem, size 10 standard offset  36 mm diameter ceramic femoral head, +1.5 mm offset  Synthes cable x 1    Indications: This is a 89 y.o. female with osteoarthritis of the hip that sustained a displaced femoral neck fracture from a mechanical fall yesterday evening. We discussed the diagnosis and treatment options and I recommended total hip arthroplasty. The operative procedure, alternatives, and risks were discussed in detail with the patient.  The risks include but are not limited to: Infection, vessel injury, nerve injury, DVT, pulmonary embolism, implant loosening, need for revision surgery, leg length discrepancy, dislocation, lateral femoral cutaneous nerve palsy, intraoperative fracture. Informed consent for surgery was signed by the patient.     Details:  The patient was seen in the preoperative holding area where the site of surgery was marked and informed consent was confirmed. The patient was brought back to the operating room by OR personnel. Anesthesia was administered. The patient was positioned supine on the [place] table. The left lower extremity was then prepped and draped in a standard and sterile fashion. A final and formal timeout was then performed which confirmed the correct patient, correct position, and correct site of surgery. IV antibiotics were administered within 1 hour of the skin incision.     A direct anterior supine approach was utilized. The skin and subcutaneous

## 2025-07-15 NOTE — PLAN OF CARE
Problem: Discharge Planning  Goal: Discharge to home or other facility with appropriate resources  Outcome: Progressing  Flowsheets (Taken 7/15/2025 1823)  Discharge to home or other facility with appropriate resources:   Identify barriers to discharge with patient and caregiver   Identify discharge learning needs (meds, wound care, etc)   Refer to discharge planning if patient needs post-hospital services based on physician order or complex needs related to functional status, cognitive ability or social support system   Arrange for needed discharge resources and transportation as appropriate     Problem: Safety - Adult  Goal: Free from fall injury  Outcome: Progressing  Flowsheets (Taken 7/15/2025 1823)  Free From Fall Injury: Instruct family/caregiver on patient safety     Problem: Pain  Goal: Verbalizes/displays adequate comfort level or baseline comfort level  Outcome: Progressing  Flowsheets (Taken 7/15/2025 1823)  Verbalizes/displays adequate comfort level or baseline comfort level:   Encourage patient to monitor pain and request assistance   Administer analgesics based on type and severity of pain and evaluate response   Consider cultural and social influences on pain and pain management   Assess pain using appropriate pain scale   Implement non-pharmacological measures as appropriate and evaluate response     Problem: Skin/Tissue Integrity  Goal: Skin integrity remains intact  Description: 1.  Monitor for areas of redness and/or skin breakdown  2.  Assess vascular access sites hourly  3.  Every 4-6 hours minimum:  Change oxygen saturation probe site  4.  Every 4-6 hours:  If on nasal continuous positive airway pressure, respiratory therapy assess nares and determine need for appliance change or resting period  Outcome: Progressing  Flowsheets (Taken 7/15/2025 1823)  Skin Integrity Remains Intact:   Monitor for areas of redness and/or skin breakdown   Assess vascular access sites hourly   Monitor skin under

## 2025-07-15 NOTE — DISCHARGE SUMMARY
V2.0  Discharge Summary    Name:  Edna Mcconnell /Age/Sex: 1935 (89 y.o. female)   Admit Date: 7/15/2025  Discharge Date: 7/15/25    MRN & CSN:  [id number] & [id number] Encounter Date and Time 7/15/25 10:52 AM EDT    Attending:  No att. providers found Discharging Provider: Candice Bennett MD       Hospital Course:     Brief HPI: Edna Mcconnell is a 89 y.o. female with PMHx of HTN, aortic stenosis, CAD ([facility]  with moderate disease proximal LAD), HLD, CARRINGTON, thyroid disease, asthma, transferred from [facility] for further management of left femoral neck fracture following a mechanical fall.  X-ray done here confirmed an impacted left femoral neck fracture.     Left hip fracture:  Orthopedic surgery consulted; posted transfer to [facility] for left hip hemiarthroplasty with Dr. Doug Castle today at 3:00 PM.  Continue  NPO, IVF, pain control,  NWB LLE.    Mild leukocytosis: Likely reactive.  Monitor CBC.    CKD 3: Stable renal function.   Avoid nephrotoxins.  Hold Lasix.  Close monitoring of BMP.     History of hypothyroidism: Previously on methimazole.  Follow-up thyroid function test.    HTN: BP low this morning.   Monitor BP off Toprol-XL given history of moderate to severe aortic stenosis noted on echocardiogram 2024.    Acute hypoxia: Patient requiring 3 L/min.  Not on oxygen at baseline.  Chest x-ray pending.  Wean off supplemental oxygen as tolerated.      The patient and family expressed appropriate understanding of, and agreement with the discharge recommendations, medications, and plan.     Consults this admission:  IP CONSULT TO SOCIAL WORK  IP CONSULT TO ORTHOPEDIC SURGERY    Discharge Diagnosis:   Hip fracture requiring operative repair, left, sequela      Discharge Instruction:   Follow up appointments: Orthopedic surgery  Primary care physician: Cristy Neves MD  Diet: Diet NPO Exceptions are: Ice Chips, Sips of Water with Meds   Activity: bedrest  Disposition:

## 2025-07-15 NOTE — H&P
V2.0  History and Physical      Name:  Edna Mcconnell /Age/Sex: 1935  (89 y.o. female)   MRN & CSN:  [id number] & [id number] Encounter Date/Time: 7/15/2025 12:56 PM EDT   Location:  [address] PCP: Cristy Neves MD       Hospital Day: 1    Assessment and Plan:   Edna Mcconnell is a 89 y.o. female with a pmh of  who presents with Fracture of hip, closed, left, initial encounter ([facility])    Hospital Problems           Last Modified POA    * (Principal) Fracture of hip, closed, left, initial encounter ([facility]) 7/15/2025 Yes       Plan:          Admit as inpatient  Telemetry [facility]  Orthopedics consulted  Follow up further recommendation from Orthopedics regarding # management  NPO   Gentle IV Hydration with NS  [place] CBC and BMP    Pain management with IV Narcotics prn      Chronic conditions reviewed and continue home meds    DVT PPX : lovenox    Code status : full code      Disposition:   Current Living situation: home  Expected Disposition: TBD  Estimated D/C: 2-3 days    Diet Diet NPO   DVT Prophylaxis [] Lovenox, []  Heparin, [] SCDs, [] Ambulation,  [] Eliquis, [] Xarelto, [] Coumadin   Code Status Prior   Surrogate Decision Maker/ POA        Personally reviewed Lab Studies and Imaging      Discussed management of the case with ED  who recommended Hospital admission     EKG interpreted personally and results : no STEMI     Imaging that was interpreted personally includes  CXR  and results : no PNA     Drugs that require monitoring for toxicity include IVF , IV morphine and the method of monitoring was Vitals monitering, BMP and CBC monitering , Telemetry monitering        History from:       Patient    Family     History of Present Illness:     Chief Complaint:   Edna Mcconnell is a 89 y.o. female with pmh of  who presents   With fall leading to her left hip fracture  Patient is being transferred from [facility] to [facility] for OR  Patient is n.p.o.  She

## 2025-07-15 NOTE — ANESTHESIA POSTPROCEDURE EVALUATION
Department of Anesthesiology  Postprocedure Note    Patient: Edna Mcconnell  MRN: [id number]  YOB: 1935  Date of evaluation: 7/15/2025    Procedure Summary       Date: 07/15/25 Room / Location: [address]    Anesthesia Start: 1422 Anesthesia Stop: 1559    Procedure: LEFT HIP TOTAL ARTHROPLASTY (Left: Hip) Diagnosis:       Closed fracture of left hip, initial encounter ([facility])      (Closed fracture of left hip, initial encounter ([facility]) [S72.002A])    Surgeons: Lyle Castle MD Responsible Provider: Lorenzo Hardy MD    Anesthesia Type: general ASA Status: 3            Anesthesia Type: No value filed.    Efrain Phase I: Efrain Score: 9    Efrain Phase II:      Anesthesia Post Evaluation    Patient location during evaluation: PACU  Patient participation: complete - patient participated  Level of consciousness: awake and alert  Pain score: 3  Nausea & Vomiting: no nausea  Cardiovascular status: hemodynamically stable  Respiratory status: acceptable  Hydration status: stable  Pain management: adequate    No notable events documented.

## 2025-07-15 NOTE — PLAN OF CARE
[facility] Orthopedic Surgery  Plan of Care Note      Edna Mcconnell 89 y.o. presenting to [facility] ER for left hip pain after mechanical fall.   CT was read as impacted left femoral neck fracture.  Posterior fusion L4-5.  eGFR 39; Cr 1.3.  Lives with spouse in private home - uses cane to ambulate at baseline.     No CD of images was sent from [facility].  Plain films here confirm impacted left femoral neck fracture    She has been NPO    No anticoags.   Baseline tramadol once daily.     Plan:  - Transfer to [place] today; accepted by hospitalist team there.  - OR time held for left hip hemiarthroplasty with Dr. Doug Castle today 3:00pm at [place]  - NPO   - NWB LLE  - Hold All AC  - Verify informed consent  - Request pre-op clearance from hospitalist   - Labs ordered    CYNTHIA Hughes - CNP 7/15/2025 9:25 AM

## 2025-07-15 NOTE — H&P
(Right, 05/17/2021); and Total knee arthroplasty (Left, 08/16/2021).    Fam HX: family history includes Cancer in her sister and sister; Diabetes in her brother and mother; Emphysema in her brother; Heart Failure in her brother and father.    Soc HX:   Social History     Socioeconomic History    Marital status:    Tobacco Use    Smoking status: Never    Smokeless tobacco: Never   Substance and Sexual Activity    Alcohol use: Never    Drug use: Never     Social Drivers of Health     Food Insecurity: No Food Insecurity (7/15/2025)    Hunger Vital Sign     Worried About Running Out of Food in the Last Year: Never true     Ran Out of Food in the Last Year: Never true   Transportation Needs: No Transportation Needs (7/15/2025)    PRAPARE - Transportation     Lack of Transportation (Medical): No     Lack of Transportation (Non-Medical): No   Housing Stability: Low Risk  (7/15/2025)    Housing Stability Vital Sign     Unable to Pay for Housing in the Last Year: No     Number of Times Moved in the Last Year: 0     Homeless in the Last Year: No       Allergies:   Allergies: No Known Allergies    Medications:   Medications:    sodium chloride flush  5-40 mL IntraVENous 2 times per day    enoxaparin  40 mg SubCUTAneous Daily    DULoxetine  60 mg Oral Daily    metoprolol succinate  50 mg Oral Daily    pantoprazole  40 mg Oral QAM AC    pravastatin  20 mg Oral Daily      Infusions:    sodium chloride      dextrose       PRN Meds: HYDROmorphone, 0.5 mg, Q4H PRN  sodium chloride flush, 5-40 mL, PRN  sodium chloride, , PRN  potassium chloride, 40 mEq, PRN   Or  potassium alternative oral replacement, 40 mEq, PRN   Or  potassium chloride, 10 mEq, PRN  magnesium sulfate, 2,000 mg, PRN  ondansetron, 4 mg, Q8H PRN   Or  ondansetron, 4 mg, Q6H PRN  polyethylene glycol, 17 g, Daily PRN  acetaminophen, 650 mg, Q6H PRN   Or  acetaminophen, 650 mg, Q6H PRN  dextrose bolus, 125 mL, PRN   Or  dextrose bolus, 250 mL, PRN  glucagon

## 2025-07-16 VITALS
HEART RATE: 79 BPM | DIASTOLIC BLOOD PRESSURE: 81 MMHG | OXYGEN SATURATION: 95 % | TEMPERATURE: 98.1 F | RESPIRATION RATE: 16 BRPM | SYSTOLIC BLOOD PRESSURE: 112 MMHG

## 2025-07-16 LAB
ANION GAP SERPL CALCULATED.3IONS-SCNC: 12 MMOL/L (ref 3–16)
BASOPHILS # BLD: 0 K/UL (ref 0–0.2)
BASOPHILS NFR BLD: 0.1 %
BUN SERPL-MCNC: 24 MG/DL (ref 7–20)
CALCIUM SERPL-MCNC: 8.1 MG/DL (ref 8.3–10.6)
CHLORIDE SERPL-SCNC: 99 MMOL/L (ref 99–110)
CO2 SERPL-SCNC: 21 MMOL/L (ref 21–32)
CREAT SERPL-MCNC: 1.2 MG/DL (ref 0.6–1.2)
DEPRECATED RDW RBC AUTO: 13.5 % (ref 12.4–15.4)
EOSINOPHIL # BLD: 0 K/UL (ref 0–0.6)
EOSINOPHIL NFR BLD: 0 %
GFR SERPLBLD CREATININE-BSD FMLA CKD-EPI: 43 ML/MIN/{1.73_M2}
GLUCOSE SERPL-MCNC: 128 MG/DL (ref 70–99)
HCT VFR BLD AUTO: 32.6 % (ref 36–48)
HGB BLD-MCNC: 11 G/DL (ref 12–16)
LYMPHOCYTES # BLD: 1.3 K/UL (ref 1–5.1)
LYMPHOCYTES NFR BLD: 8.4 %
MCH RBC QN AUTO: 31.1 PG (ref 26–34)
MCHC RBC AUTO-ENTMCNC: 33.6 G/DL (ref 31–36)
MCV RBC AUTO: 92.6 FL (ref 80–100)
MONOCYTES # BLD: 1.1 K/UL (ref 0–1.3)
MONOCYTES NFR BLD: 7.4 %
NEUTROPHILS # BLD: 12.8 K/UL (ref 1.7–7.7)
NEUTROPHILS NFR BLD: 84.1 %
PLATELET # BLD AUTO: 170 K/UL (ref 135–450)
PMV BLD AUTO: 9.5 FL (ref 5–10.5)
POTASSIUM SERPL-SCNC: 4.1 MMOL/L (ref 3.5–5.1)
RBC # BLD AUTO: 3.52 M/UL (ref 4–5.2)
SODIUM SERPL-SCNC: 132 MMOL/L (ref 136–145)
WBC # BLD AUTO: 15.2 K/UL (ref 4–11)

## 2025-07-16 PROCEDURE — 97162 PT EVAL MOD COMPLEX 30 MIN: CPT

## 2025-07-16 PROCEDURE — 6370000000 HC RX 637 (ALT 250 FOR IP): Performed by: ORTHOPAEDIC SURGERY

## 2025-07-16 PROCEDURE — 85025 COMPLETE CBC W/AUTO DIFF WBC: CPT

## 2025-07-16 PROCEDURE — 2700000000 HC OXYGEN THERAPY PER DAY

## 2025-07-16 PROCEDURE — 97535 SELF CARE MNGMENT TRAINING: CPT

## 2025-07-16 PROCEDURE — 2580000003 HC RX 258: Performed by: HOSPITALIST

## 2025-07-16 PROCEDURE — 97530 THERAPEUTIC ACTIVITIES: CPT

## 2025-07-16 PROCEDURE — 97166 OT EVAL MOD COMPLEX 45 MIN: CPT

## 2025-07-16 PROCEDURE — 94761 N-INVAS EAR/PLS OXIMETRY MLT: CPT

## 2025-07-16 PROCEDURE — 6370000000 HC RX 637 (ALT 250 FOR IP): Performed by: HOSPITALIST

## 2025-07-16 PROCEDURE — 80048 BASIC METABOLIC PNL TOTAL CA: CPT

## 2025-07-16 PROCEDURE — 6360000002 HC RX W HCPCS: Performed by: ORTHOPAEDIC SURGERY

## 2025-07-16 PROCEDURE — 1200000000 HC SEMI PRIVATE

## 2025-07-16 PROCEDURE — 36415 COLL VENOUS BLD VENIPUNCTURE: CPT

## 2025-07-16 PROCEDURE — 2500000003 HC RX 250 WO HCPCS: Performed by: ORTHOPAEDIC SURGERY

## 2025-07-16 RX ORDER — ACETAMINOPHEN 325 MG/1
650 TABLET ORAL EVERY 4 HOURS PRN
Status: DISCONTINUED | OUTPATIENT
Start: 2025-07-16 | End: 2025-07-17 | Stop reason: HOSPADM

## 2025-07-16 RX ORDER — SODIUM CHLORIDE 9 MG/ML
INJECTION, SOLUTION INTRAVENOUS CONTINUOUS
Status: DISCONTINUED | OUTPATIENT
Start: 2025-07-16 | End: 2025-07-17

## 2025-07-16 RX ORDER — HYDROCODONE BITARTRATE AND ACETAMINOPHEN 5; 325 MG/1; MG/1
1 TABLET ORAL EVERY 6 HOURS PRN
Qty: 20 TABLET | Refills: 0 | Status: ON HOLD | OUTPATIENT
Start: 2025-07-16 | End: 2025-07-21

## 2025-07-16 RX ORDER — ASPIRIN 81 MG/1
81 TABLET ORAL
Qty: 60 TABLET | Refills: 0 | Status: ON HOLD | OUTPATIENT
Start: 2025-07-16 | End: 2025-08-15

## 2025-07-16 RX ORDER — 0.9 % SODIUM CHLORIDE 0.9 %
1000 INTRAVENOUS SOLUTION INTRAVENOUS ONCE
Status: COMPLETED | OUTPATIENT
Start: 2025-07-16 | End: 2025-07-16

## 2025-07-16 RX ADMIN — SODIUM CHLORIDE, PRESERVATIVE FREE 10 ML: 5 INJECTION INTRAVENOUS at 09:26

## 2025-07-16 RX ADMIN — ACETAMINOPHEN 650 MG: 325 TABLET ORAL at 18:04

## 2025-07-16 RX ADMIN — ENOXAPARIN SODIUM 30 MG: 100 INJECTION SUBCUTANEOUS at 20:42

## 2025-07-16 RX ADMIN — HYDROCODONE BITARTRATE AND ACETAMINOPHEN 1 TABLET: 5; 325 TABLET ORAL at 23:05

## 2025-07-16 RX ADMIN — SODIUM CHLORIDE 1000 ML: 0.9 INJECTION, SOLUTION INTRAVENOUS at 12:24

## 2025-07-16 RX ADMIN — PANTOPRAZOLE SODIUM 40 MG: 40 TABLET, DELAYED RELEASE ORAL at 05:13

## 2025-07-16 RX ADMIN — SODIUM CHLORIDE, PRESERVATIVE FREE 10 ML: 5 INJECTION INTRAVENOUS at 20:42

## 2025-07-16 RX ADMIN — HYDROCODONE BITARTRATE AND ACETAMINOPHEN 1 TABLET: 5; 325 TABLET ORAL at 13:32

## 2025-07-16 RX ADMIN — SODIUM CHLORIDE: 0.9 INJECTION, SOLUTION INTRAVENOUS at 16:37

## 2025-07-16 RX ADMIN — WATER 2000 MG: 1 INJECTION INTRAMUSCULAR; INTRAVENOUS; SUBCUTANEOUS at 05:13

## 2025-07-16 RX ADMIN — DULOXETINE 60 MG: 60 CAPSULE, DELAYED RELEASE ORAL at 09:26

## 2025-07-16 RX ADMIN — ACETAMINOPHEN 650 MG: 325 TABLET ORAL at 12:19

## 2025-07-16 RX ADMIN — PRAVASTATIN SODIUM 20 MG: 20 TABLET ORAL at 09:26

## 2025-07-16 ASSESSMENT — PAIN SCALES - GENERAL
PAINLEVEL_OUTOF10: 0
PAINLEVEL_OUTOF10: 0
PAINLEVEL_OUTOF10: 6
PAINLEVEL_OUTOF10: 6
PAINLEVEL_OUTOF10: 0
PAINLEVEL_OUTOF10: 8
PAINLEVEL_OUTOF10: 5
PAINLEVEL_OUTOF10: 5
PAINLEVEL_OUTOF10: 8
PAINLEVEL_OUTOF10: 5
PAINLEVEL_OUTOF10: 8
PAINLEVEL_OUTOF10: 6
PAINLEVEL_OUTOF10: 5

## 2025-07-16 ASSESSMENT — PAIN DESCRIPTION - DESCRIPTORS
DESCRIPTORS: ACHING;DISCOMFORT
DESCRIPTORS: ACHING;DISCOMFORT
DESCRIPTORS: ACHING
DESCRIPTORS: ACHING;DISCOMFORT
DESCRIPTORS: ACHING

## 2025-07-16 ASSESSMENT — PAIN DESCRIPTION - FREQUENCY
FREQUENCY: CONTINUOUS

## 2025-07-16 ASSESSMENT — PAIN DESCRIPTION - PAIN TYPE
TYPE: SURGICAL PAIN
TYPE: SURGICAL PAIN
TYPE: ACUTE PAIN;SURGICAL PAIN

## 2025-07-16 ASSESSMENT — PAIN DESCRIPTION - ONSET
ONSET: ON-GOING

## 2025-07-16 ASSESSMENT — PAIN - FUNCTIONAL ASSESSMENT
PAIN_FUNCTIONAL_ASSESSMENT: PREVENTS OR INTERFERES SOME ACTIVE ACTIVITIES AND ADLS
PAIN_FUNCTIONAL_ASSESSMENT: PREVENTS OR INTERFERES SOME ACTIVE ACTIVITIES AND ADLS
PAIN_FUNCTIONAL_ASSESSMENT: ACTIVITIES ARE NOT PREVENTED
PAIN_FUNCTIONAL_ASSESSMENT: PREVENTS OR INTERFERES SOME ACTIVE ACTIVITIES AND ADLS
PAIN_FUNCTIONAL_ASSESSMENT: ACTIVITIES ARE NOT PREVENTED

## 2025-07-16 ASSESSMENT — PAIN DESCRIPTION - LOCATION
LOCATION: INCISION;HIP
LOCATION: BACK;INCISION
LOCATION: KNEE
LOCATION: INCISION
LOCATION: HIP;INCISION
LOCATION: INCISION
LOCATION: KNEE

## 2025-07-16 ASSESSMENT — PAIN DESCRIPTION - ORIENTATION
ORIENTATION: LEFT

## 2025-07-16 ASSESSMENT — PAIN DESCRIPTION - DIRECTION: RADIATING_TOWARDS: HIP

## 2025-07-16 NOTE — CARE COORDINATION
Received call from Mariana [facility]- they can accept    Received call from Tiffanie saenz [facility] #[phone]- they can accept for their swing bed unit   Patient will need a 3 day Medicare stay prior to admission to SNF     Will await final DC orders   Electronically signed by Lulu Khan on 7/16/2025 at 4:47 PM  #[phone]

## 2025-07-16 NOTE — PLAN OF CARE
Problem: Discharge Planning  Goal: Discharge to home or other facility with appropriate resources  7/15/2025 2353 by Lesly Prado RN  Outcome: Progressing  Flowsheets (Taken 7/15/2025 1823 by Perla Almonte RN)  Discharge to home or other facility with appropriate resources:   Identify barriers to discharge with patient and caregiver   Identify discharge learning needs (meds, wound care, etc)   Refer to discharge planning if patient needs post-hospital services based on physician order or complex needs related to functional status, cognitive ability or social support system   Arrange for needed discharge resources and transportation as appropriate  7/15/2025 1823 by Perla Almonte RN  Outcome: Progressing  Flowsheets (Taken 7/15/2025 1823)  Discharge to home or other facility with appropriate resources:   Identify barriers to discharge with patient and caregiver   Identify discharge learning needs (meds, wound care, etc)   Refer to discharge planning if patient needs post-hospital services based on physician order or complex needs related to functional status, cognitive ability or social support system   Arrange for needed discharge resources and transportation as appropriate     Problem: Safety - Adult  Goal: Free from fall injury  7/15/2025 2353 by Lesly Prado RN  Outcome: Progressing  Flowsheets (Taken 7/15/2025 2352)  Free From Fall Injury: Instruct family/caregiver on patient safety  7/15/2025 1823 by Perla Almonte RN  Outcome: Progressing  Flowsheets (Taken 7/15/2025 1823)  Free From Fall Injury: Instruct family/caregiver on patient safety     Problem: Pain  Goal: Verbalizes/displays adequate comfort level or baseline comfort level  7/15/2025 2353 by Lesly Prado RN  Outcome: Progressing  Flowsheets  Taken 7/15/2025 2330  Verbalizes/displays adequate comfort level or baseline comfort level: Assess pain using appropriate pain scale  Taken 7/15/2025 1906  Verbalizes/displays

## 2025-07-16 NOTE — CARE COORDINATION
07/16/25 1313   Readmission Assessment   Number of Days since last admission? 1-7 days  (pt was a transfer from [facility] to [facility] to [facility] - no readmit screen needed)     Electronically signed by Lulu Khan on 7/16/2025 at 1:14 PM  #[phone]

## 2025-07-16 NOTE — CARE COORDINATION
07/16/25 1224   Service Assessment   Patient Orientation Alert and Oriented;Person;Place;Situation   Cognition Alert   History Provided By Patient   Primary Caregiver Self   Accompanied By/Relationship daughter Yudith   Support Systems Spouse/Significant Other   Patient's Healthcare Decision Maker is: Legal Next of Kin   PCP Verified by CM Yes   Last Visit to PCP Within last 6 months   Prior Functional Level Independent in ADLs/IADLs   Current Functional Level Assistance with the following:;Bathing;Dressing;Toileting;Cooking;Housework;Shopping;Mobility   Can patient return to prior living arrangement No   Ability to make needs known: Good   Family able to assist with home care needs: No   Would you like for me to discuss the discharge plan with any other family members/significant others, and if so, who? Yes  (luzma rao at bedside)   Financial Resources Medicare   Community Resources None   CM/SW Referral Other (see comment)  (dc needs)   Discharge Planning   Type of Residence House   Living Arrangements Spouse/Significant Other   Current Services Prior To Admission Durable Medical Equipment   Current DME Prior to Arrival Cane;Walker;Shower Chair;Other (Comment)  (grab abrs in shower-lift chair)   Potential Assistance Needed Skilled Nursing Facility   DME Ordered? No   Potential Assistance Purchasing Medications No   Type of Home Care Services None   Patient expects to be discharged to: Unknown   Services At/After Discharge    Resource Information Provided? No   Mode of Transport at Discharge BLS   Condition of Participation: Discharge Planning   The Plan for Transition of Care is related to the following treatment goals: skilled and rehab facility   The Patient and/or Patient Representative was provided with a Choice of Provider? Patient   The Patient and/Or Patient Representative agree with the Discharge Plan? Yes   Freedom of Choice list was provided with basic dialogue that supports the patient's

## 2025-07-16 NOTE — DISCHARGE INSTR - COC
[facility] Continuity of Care Form    Patient Name:  Edna Mcconnell  : 1935    MRN:  [id number]    Admit date:  7/15/2025  Discharge date:  2025    Code Status Order: Full Code  Advance Directives: Yes    Admitting Physician: Kendell Garza MD  PCP: Cristy Neves MD    Discharging Nurse: KRISTIN Walsh   Discharging Hospital Unit/Room#: H3Y-2961/3130-01  Discharging Unit Phone Number: [phone]    Emergency Contact:        Past Surgical History:  Past Surgical History:   Procedure Laterality Date    BREAST SURGERY      CATARACT REMOVAL      CHOLECYSTECTOMY      DIAGNOSTIC CARDIAC CATH LAB PROCEDURE      DILATION AND CURETTAGE OF UTERUS      EYE SURGERY      HYSTERECTOMY (CERVIX STATUS UNKNOWN)      KNEE SURGERY      LUMBAR FUSION  2018    ROTATOR CUFF REPAIR      TOTAL KNEE ARTHROPLASTY Right 2021    RIGHT TOTAL KNEE REPLACEMENT performed by Ken Pedroza MD at [facility] OR    TOTAL KNEE ARTHROPLASTY Left 2021    LEFT TOTAL KNEE REPLACEMENT performed by Ken Pedroza MD at [facility] OR    WRIST SURGERY         Immunization History:   Immunization History   Administered Date(s) Administered    COVID-19, MODERNA, , (age 12y+), IM, 50mcg/0.5mL 2023    Influenza Virus Vaccine 10/01/2013       Active Problems:  Principal Problem:    Closed fracture of neck of left femur (HCC)  Resolved Problems:    * No resolved hospital problems. *      Isolation/Infection:       Nurse Assessment:  Last Vital Signs:BP (!) 87/67   Pulse 90   Temp 99.5 °F (37.5 °C) (Oral)   Resp 17   Ht 1.6 m (5' 3\")   Wt 90.4 kg (199 lb 4.7 oz)   SpO2 93%   BMI 35.30 kg/m²   Last documented pain score (0-10 scale): Pain Level: 6  Last Weight:   Wt Readings from Last 1 Encounters:   25 90.4 kg (199 lb 4.7 oz)     Mental Status:  alert, coherent, and oriented to person & situtation, disoriented to place & time      IV Access:  - None    Nursing Mobility/ADLs:  Walking   Dependent - winter

## 2025-07-16 NOTE — CARE COORDINATION
[facility]   After review, this patient is felt to be:       [x]  Appropriate for Acute Inpatient Rehab    []  Appropriate for Acute Inpatient Rehab Pending Insurance Authorization    []  Not appropriate for Acute Inpatient Rehab    []  Referral received and ARU reviewing patient; Evaluation ongoing.      Discussed case with Dr Bae this date- deemed appropriate for ARU. No Precert required. Following for when medically ready for discharge. Per CM patient also interested in [facility] bed (closer to home)- however currently doesn't have required 3 day stay for SNF.   Will notify DCP with further updates. Thank you for the referral.     Mariana Castanon  Clinical Liaison  [facility]  P:[phone]

## 2025-07-16 NOTE — PLAN OF CARE
Problem: Discharge Planning  Goal: Discharge to home or other facility with appropriate resources  7/16/2025 1034 by Librado Meza RN    Discharge to home or other facility with appropriate resources: Identify barriers to discharge with patient and caregiver     Problem: Safety - Adult  Goal: Free from fall injury  7/16/2025 1034 by Librado Meza RN  Flowsheets (Taken 7/16/2025 0739)  Free From Fall Injury: Instruct family/caregiver on patient safety     Problem: Pain  Goal: Verbalizes/displays adequate comfort level or baseline comfort level  7/16/2025 1034 by Librado Meza RN  Verbalizes/displays adequate comfort level or baseline comfort level: Assess pain using appropriate pain scale     Problem: Skin/Tissue Integrity  Goal: Skin integrity remains intact  Description: 1.  Monitor for areas of redness and/or skin breakdown  2.  Assess vascular access sites hourly  3.  Every 4-6 hours minimum:  Change oxygen saturation probe site  4.  Every 4-6 hours:  If on nasal continuous positive airway pressure, respiratory therapy assess nares and determine need for appliance change or resting period  7/16/2025 1034 by Librado Meza RN  Flowsheets  Taken 7/16/2025 0756  Skin Integrity Remains Intact: Monitor for areas of redness and/or skin breakdown  Taken 7/16/2025 0739  Skin Integrity Remains Intact: Monitor for areas of redness and/or skin breakdown     Problem: ABCDS Injury Assessment  Goal: Absence of physical injury  7/16/2025 1034 by Librado Meza RN  Flowsheets (Taken 7/16/2025 0739)  Absence of Physical Injury: Implement safety measures based on patient assessment

## 2025-07-16 NOTE — CONSULTS
Consult Note  Physical Medicine and Rehabilitation    Patient: Edna Mcconnell  [id number]  Date: 7/16/2025      Chief Complaint: left hip pain    History of Present Illness/Hospital Course:  Patient is an 89 yo F with pmh HTN, HLD, GERD, anxiety, and chronic back pain (s/p spine surgery c/b post-op infection) who initially presented on 7/15/2025 with left hip pain after mechanical fall. She was found to have displaced left femoral neck fracture. Underwent left anterior SADIQ (7/15 with Dr. Castle). Now WBAT LLE with anterior precautions. Post-op course notable for hypotension, hyponatremia, leukocytosis. Currently, patient reports moderate left hip pain. Worse with movement. Improves with rest and medication. She denies sensory changes. She did have some dizziness/lightheadedness when getting up to use bathroom today.     Prior Level of Function:  Modified independent for mobility (with cane or furniture walking), ADLs  Requires assist for IADLs    Current Level of Function:  Min-modA x 2 person assist for transfers in [place]  Up to total assist for ADLs    Pertinent Social History:  Support: lives with spouse  Home set-up: 1 level house with 2 steps to enter    Past Medical History:   Diagnosis Date    Allergic rhinitis     Anxiety     Arthritis     Asthma     Back pain     GERD (gastroesophageal reflux disease)     Goiter     Hyperlipidemia     Hypertension     MRSA (methicillin resistant staph aureus) culture positive     CARRINGTON (obstructive sleep apnea)     Rash     non-cancerous moles removed    Thyroid disease        Past Surgical History:   Procedure Laterality Date    BREAST SURGERY      CATARACT REMOVAL      CHOLECYSTECTOMY      DIAGNOSTIC CARDIAC CATH LAB PROCEDURE      DILATION AND CURETTAGE OF UTERUS      EYE SURGERY      HYSTERECTOMY (CERVIX STATUS UNKNOWN)      KNEE SURGERY      LUMBAR FUSION  08/2018    ROTATOR CUFF REPAIR      TOTAL HIP ARTHROPLASTY Left 7/15/2025    LEFT HIP TOTAL ARTHROPLASTY performed

## 2025-07-16 NOTE — ACP (ADVANCE CARE PLANNING)
Advance Care Planning   Healthcare Decision Maker:    Primary Decision Maker: JayeshAnival - Spouse - [phone]    Secondary Decision Maker: Yudith Rojas - Child - [phone]    Secondary Decision Maker: Antonietta Charles - Child - [phone]    Electronically signed by uLlu Khan on 7/16/2025 at 1:20 PM  #[phone]

## 2025-07-17 ENCOUNTER — HOSPITAL ENCOUNTER (INPATIENT)
Age: 89
LOS: 16 days | Discharge: HOME | DRG: 560 | End: 2025-08-02
Attending: STUDENT IN AN ORGANIZED HEALTH CARE EDUCATION/TRAINING PROGRAM | Admitting: STUDENT IN AN ORGANIZED HEALTH CARE EDUCATION/TRAINING PROGRAM
Payer: MEDICARE

## 2025-07-17 VITALS
HEART RATE: 96 BPM | SYSTOLIC BLOOD PRESSURE: 126 MMHG | BODY MASS INDEX: 35.31 KG/M2 | TEMPERATURE: 98.1 F | RESPIRATION RATE: 16 BRPM | OXYGEN SATURATION: 92 % | HEIGHT: 63 IN | DIASTOLIC BLOOD PRESSURE: 58 MMHG | WEIGHT: 199.3 LBS

## 2025-07-17 DIAGNOSIS — S72.002A CLOSED LEFT HIP FRACTURE, INITIAL ENCOUNTER (HCC): Primary | ICD-10-CM

## 2025-07-17 LAB
ANION GAP SERPL CALCULATED.3IONS-SCNC: 10 MMOL/L (ref 3–16)
BASOPHILS # BLD: 0 K/UL (ref 0–0.2)
BASOPHILS NFR BLD: 0.3 %
BUN SERPL-MCNC: 21 MG/DL (ref 7–20)
CALCIUM SERPL-MCNC: 7.7 MG/DL (ref 8.3–10.6)
CHLORIDE SERPL-SCNC: 107 MMOL/L (ref 99–110)
CO2 SERPL-SCNC: 22 MMOL/L (ref 21–32)
CREAT SERPL-MCNC: 1.1 MG/DL (ref 0.6–1.2)
DEPRECATED RDW RBC AUTO: 13.5 % (ref 12.4–15.4)
EOSINOPHIL # BLD: 0.2 K/UL (ref 0–0.6)
EOSINOPHIL NFR BLD: 1.8 %
GFR SERPLBLD CREATININE-BSD FMLA CKD-EPI: 48 ML/MIN/{1.73_M2}
GLUCOSE SERPL-MCNC: 146 MG/DL (ref 70–99)
HCT VFR BLD AUTO: 27.7 % (ref 36–48)
HGB BLD-MCNC: 9.5 G/DL (ref 12–16)
LYMPHOCYTES # BLD: 1.4 K/UL (ref 1–5.1)
LYMPHOCYTES NFR BLD: 15 %
MCH RBC QN AUTO: 31.4 PG (ref 26–34)
MCHC RBC AUTO-ENTMCNC: 34.1 G/DL (ref 31–36)
MCV RBC AUTO: 92.2 FL (ref 80–100)
MONOCYTES # BLD: 0.7 K/UL (ref 0–1.3)
MONOCYTES NFR BLD: 6.8 %
NEUTROPHILS # BLD: 7.3 K/UL (ref 1.7–7.7)
NEUTROPHILS NFR BLD: 76.1 %
PLATELET # BLD AUTO: 160 K/UL (ref 135–450)
PMV BLD AUTO: 9.5 FL (ref 5–10.5)
POTASSIUM SERPL-SCNC: 3.9 MMOL/L (ref 3.5–5.1)
RBC # BLD AUTO: 3.01 M/UL (ref 4–5.2)
SODIUM SERPL-SCNC: 139 MMOL/L (ref 136–145)
WBC # BLD AUTO: 9.6 K/UL (ref 4–11)

## 2025-07-17 PROCEDURE — 97535 SELF CARE MNGMENT TRAINING: CPT

## 2025-07-17 PROCEDURE — 2500000003 HC RX 250 WO HCPCS: Performed by: ORTHOPAEDIC SURGERY

## 2025-07-17 PROCEDURE — 6370000000 HC RX 637 (ALT 250 FOR IP): Performed by: ORTHOPAEDIC SURGERY

## 2025-07-17 PROCEDURE — 2580000003 HC RX 258: Performed by: HOSPITALIST

## 2025-07-17 PROCEDURE — 80048 BASIC METABOLIC PNL TOTAL CA: CPT

## 2025-07-17 PROCEDURE — 97530 THERAPEUTIC ACTIVITIES: CPT

## 2025-07-17 PROCEDURE — 36415 COLL VENOUS BLD VENIPUNCTURE: CPT

## 2025-07-17 PROCEDURE — 1280000000 HC REHAB R&B

## 2025-07-17 PROCEDURE — 97116 GAIT TRAINING THERAPY: CPT

## 2025-07-17 PROCEDURE — 6370000000 HC RX 637 (ALT 250 FOR IP): Performed by: STUDENT IN AN ORGANIZED HEALTH CARE EDUCATION/TRAINING PROGRAM

## 2025-07-17 PROCEDURE — 6370000000 HC RX 637 (ALT 250 FOR IP): Performed by: HOSPITALIST

## 2025-07-17 PROCEDURE — 6360000002 HC RX W HCPCS: Performed by: STUDENT IN AN ORGANIZED HEALTH CARE EDUCATION/TRAINING PROGRAM

## 2025-07-17 PROCEDURE — 85025 COMPLETE CBC W/AUTO DIFF WBC: CPT

## 2025-07-17 RX ORDER — DULOXETIN HYDROCHLORIDE 60 MG/1
60 CAPSULE, DELAYED RELEASE ORAL DAILY
Status: CANCELLED | OUTPATIENT
Start: 2025-07-18

## 2025-07-17 RX ORDER — SODIUM CHLORIDE 9 MG/ML
INJECTION, SOLUTION INTRAVENOUS PRN
Status: CANCELLED | OUTPATIENT
Start: 2025-07-17

## 2025-07-17 RX ORDER — HYDROCODONE BITARTRATE AND ACETAMINOPHEN 5; 325 MG/1; MG/1
1 TABLET ORAL EVERY 4 HOURS PRN
Status: DISCONTINUED | OUTPATIENT
Start: 2025-07-17 | End: 2025-08-02 | Stop reason: HOSPADM

## 2025-07-17 RX ORDER — SODIUM CHLORIDE 9 MG/ML
INJECTION, SOLUTION INTRAVENOUS PRN
Status: DISCONTINUED | OUTPATIENT
Start: 2025-07-17 | End: 2025-08-02 | Stop reason: HOSPADM

## 2025-07-17 RX ORDER — PANTOPRAZOLE SODIUM 40 MG/1
40 TABLET, DELAYED RELEASE ORAL
Status: CANCELLED | OUTPATIENT
Start: 2025-07-18

## 2025-07-17 RX ORDER — BISACODYL 10 MG
10 SUPPOSITORY, RECTAL RECTAL DAILY PRN
Status: CANCELLED | OUTPATIENT
Start: 2025-07-17

## 2025-07-17 RX ORDER — METOPROLOL SUCCINATE 50 MG/1
50 TABLET, EXTENDED RELEASE ORAL DAILY
Status: DISCONTINUED | OUTPATIENT
Start: 2025-07-18 | End: 2025-07-19

## 2025-07-17 RX ORDER — PANTOPRAZOLE SODIUM 40 MG/1
40 TABLET, DELAYED RELEASE ORAL
Status: DISCONTINUED | OUTPATIENT
Start: 2025-07-18 | End: 2025-08-02 | Stop reason: HOSPADM

## 2025-07-17 RX ORDER — FUROSEMIDE 20 MG/1
20 TABLET ORAL DAILY
Status: CANCELLED | OUTPATIENT
Start: 2025-07-18

## 2025-07-17 RX ORDER — ENOXAPARIN SODIUM 100 MG/ML
30 INJECTION SUBCUTANEOUS NIGHTLY
Status: DISCONTINUED | OUTPATIENT
Start: 2025-07-17 | End: 2025-07-30

## 2025-07-17 RX ORDER — HYDRALAZINE HYDROCHLORIDE 10 MG/1
10 TABLET, FILM COATED ORAL EVERY 6 HOURS PRN
Status: CANCELLED | OUTPATIENT
Start: 2025-07-17

## 2025-07-17 RX ORDER — FUROSEMIDE 20 MG/1
20 TABLET ORAL DAILY
Status: DISCONTINUED | OUTPATIENT
Start: 2025-07-18 | End: 2025-08-02 | Stop reason: HOSPADM

## 2025-07-17 RX ORDER — PRAVASTATIN SODIUM 20 MG
20 TABLET ORAL DAILY
Status: CANCELLED | OUTPATIENT
Start: 2025-07-18

## 2025-07-17 RX ORDER — POLYETHYLENE GLYCOL 3350 17 G/17G
17 POWDER, FOR SOLUTION ORAL DAILY PRN
Status: CANCELLED | OUTPATIENT
Start: 2025-07-17

## 2025-07-17 RX ORDER — SODIUM CHLORIDE 0.9 % (FLUSH) 0.9 %
5-40 SYRINGE (ML) INJECTION EVERY 12 HOURS SCHEDULED
Status: DISCONTINUED | OUTPATIENT
Start: 2025-07-17 | End: 2025-07-21

## 2025-07-17 RX ORDER — ACETAMINOPHEN 325 MG/1
650 TABLET ORAL EVERY 4 HOURS PRN
Status: DISCONTINUED | OUTPATIENT
Start: 2025-07-17 | End: 2025-07-29

## 2025-07-17 RX ORDER — HYDRALAZINE HYDROCHLORIDE 10 MG/1
10 TABLET, FILM COATED ORAL EVERY 6 HOURS PRN
Status: DISCONTINUED | OUTPATIENT
Start: 2025-07-17 | End: 2025-08-02 | Stop reason: HOSPADM

## 2025-07-17 RX ORDER — PRAVASTATIN SODIUM 20 MG
20 TABLET ORAL DAILY
Status: DISCONTINUED | OUTPATIENT
Start: 2025-07-18 | End: 2025-08-02 | Stop reason: HOSPADM

## 2025-07-17 RX ORDER — SODIUM CHLORIDE 0.9 % (FLUSH) 0.9 %
5-40 SYRINGE (ML) INJECTION PRN
Status: DISCONTINUED | OUTPATIENT
Start: 2025-07-17 | End: 2025-08-02 | Stop reason: HOSPADM

## 2025-07-17 RX ORDER — POLYETHYLENE GLYCOL 3350 17 G/17G
17 POWDER, FOR SOLUTION ORAL DAILY PRN
Status: DISCONTINUED | OUTPATIENT
Start: 2025-07-17 | End: 2025-08-02 | Stop reason: HOSPADM

## 2025-07-17 RX ORDER — DULOXETIN HYDROCHLORIDE 60 MG/1
60 CAPSULE, DELAYED RELEASE ORAL DAILY
Status: DISCONTINUED | OUTPATIENT
Start: 2025-07-18 | End: 2025-08-02 | Stop reason: HOSPADM

## 2025-07-17 RX ORDER — ACETAMINOPHEN 325 MG/1
650 TABLET ORAL EVERY 4 HOURS PRN
Status: CANCELLED | OUTPATIENT
Start: 2025-07-17

## 2025-07-17 RX ORDER — HYDROCODONE BITARTRATE AND ACETAMINOPHEN 5; 325 MG/1; MG/1
1 TABLET ORAL EVERY 4 HOURS PRN
Status: CANCELLED | OUTPATIENT
Start: 2025-07-17

## 2025-07-17 RX ORDER — SODIUM CHLORIDE 0.9 % (FLUSH) 0.9 %
5-40 SYRINGE (ML) INJECTION EVERY 12 HOURS SCHEDULED
Status: CANCELLED | OUTPATIENT
Start: 2025-07-17

## 2025-07-17 RX ORDER — BISACODYL 10 MG
10 SUPPOSITORY, RECTAL RECTAL DAILY PRN
Status: DISCONTINUED | OUTPATIENT
Start: 2025-07-17 | End: 2025-08-02 | Stop reason: HOSPADM

## 2025-07-17 RX ORDER — ENOXAPARIN SODIUM 100 MG/ML
30 INJECTION SUBCUTANEOUS NIGHTLY
Status: CANCELLED | OUTPATIENT
Start: 2025-07-17

## 2025-07-17 RX ORDER — METOPROLOL SUCCINATE 50 MG/1
50 TABLET, EXTENDED RELEASE ORAL DAILY
Status: CANCELLED | OUTPATIENT
Start: 2025-07-18

## 2025-07-17 RX ORDER — SODIUM CHLORIDE 0.9 % (FLUSH) 0.9 %
5-40 SYRINGE (ML) INJECTION PRN
Status: CANCELLED | OUTPATIENT
Start: 2025-07-17

## 2025-07-17 RX ADMIN — HYDROCODONE BITARTRATE AND ACETAMINOPHEN 1 TABLET: 5; 325 TABLET ORAL at 21:07

## 2025-07-17 RX ADMIN — SODIUM CHLORIDE, PRESERVATIVE FREE 10 ML: 5 INJECTION INTRAVENOUS at 08:48

## 2025-07-17 RX ADMIN — ENOXAPARIN SODIUM 30 MG: 100 INJECTION SUBCUTANEOUS at 21:07

## 2025-07-17 RX ADMIN — METOPROLOL SUCCINATE 50 MG: 50 TABLET, EXTENDED RELEASE ORAL at 08:47

## 2025-07-17 RX ADMIN — DULOXETINE 60 MG: 60 CAPSULE, DELAYED RELEASE ORAL at 08:46

## 2025-07-17 RX ADMIN — HYDROCODONE BITARTRATE AND ACETAMINOPHEN 1 TABLET: 5; 325 TABLET ORAL at 04:55

## 2025-07-17 RX ADMIN — PRAVASTATIN SODIUM 20 MG: 20 TABLET ORAL at 08:46

## 2025-07-17 RX ADMIN — HYDROCODONE BITARTRATE AND ACETAMINOPHEN 1 TABLET: 5; 325 TABLET ORAL at 16:15

## 2025-07-17 RX ADMIN — FUROSEMIDE 20 MG: 20 TABLET ORAL at 08:46

## 2025-07-17 RX ADMIN — PANTOPRAZOLE SODIUM 40 MG: 40 TABLET, DELAYED RELEASE ORAL at 04:56

## 2025-07-17 RX ADMIN — SODIUM CHLORIDE: 0.9 INJECTION, SOLUTION INTRAVENOUS at 04:56

## 2025-07-17 RX ADMIN — ACETAMINOPHEN 650 MG: 325 TABLET ORAL at 08:46

## 2025-07-17 RX ADMIN — Medication 3 MG: at 21:07

## 2025-07-17 ASSESSMENT — PAIN - FUNCTIONAL ASSESSMENT
PAIN_FUNCTIONAL_ASSESSMENT: PREVENTS OR INTERFERES SOME ACTIVE ACTIVITIES AND ADLS
PAIN_FUNCTIONAL_ASSESSMENT: ACTIVITIES ARE NOT PREVENTED
PAIN_FUNCTIONAL_ASSESSMENT: PREVENTS OR INTERFERES SOME ACTIVE ACTIVITIES AND ADLS

## 2025-07-17 ASSESSMENT — PAIN DESCRIPTION - DESCRIPTORS
DESCRIPTORS: ACHING;DISCOMFORT
DESCRIPTORS: SHARP
DESCRIPTORS: DISCOMFORT

## 2025-07-17 ASSESSMENT — PAIN DESCRIPTION - PAIN TYPE
TYPE: SURGICAL PAIN

## 2025-07-17 ASSESSMENT — PAIN SCALES - GENERAL
PAINLEVEL_OUTOF10: 6
PAINLEVEL_OUTOF10: 0
PAINLEVEL_OUTOF10: 2
PAINLEVEL_OUTOF10: 8
PAINLEVEL_OUTOF10: 2
PAINLEVEL_OUTOF10: 5
PAINLEVEL_OUTOF10: 4
PAINLEVEL_OUTOF10: 5

## 2025-07-17 ASSESSMENT — PAIN DESCRIPTION - FREQUENCY
FREQUENCY: INTERMITTENT
FREQUENCY: CONTINUOUS
FREQUENCY: INTERMITTENT

## 2025-07-17 ASSESSMENT — PAIN DESCRIPTION - LOCATION
LOCATION: HIP

## 2025-07-17 ASSESSMENT — PAIN DESCRIPTION - ORIENTATION
ORIENTATION: LEFT

## 2025-07-17 ASSESSMENT — PAIN DESCRIPTION - DIRECTION
RADIATING_TOWARDS: LLE
RADIATING_TOWARDS: LLE

## 2025-07-17 ASSESSMENT — PAIN DESCRIPTION - ONSET
ONSET: ON-GOING
ONSET: ON-GOING
ONSET: PROGRESSIVE

## 2025-07-17 NOTE — CARE COORDINATION
DISCHARGE SUMMARY     DATE OF DISCHARGE: 7/17/2025    DISCHARGE DESTINATION: [facility] Acute Rehab    TRANSPORTATION:     Company Name:  [facility] [employer] [employer]      Time: 430pm    Phone Number: [phone]       COMMENTS: [facility]-430pm transport time requested.   Spoke with patient and daughter Antonietta present in the room regarding above. Answered questions about aru vs snf level of care/medicare coverage.   Patient/Family agreeable to [facility] ARU. Informed them of 430pm transport request.   Rn aware. Report number [phone].    Electronically signed by Betty Fonseca, BERTHA, LISW, Case Management on 7/17/2025 at 1:29 PM  [place] [phone]

## 2025-07-17 NOTE — PROGRESS NOTES
V2.0    [facility] Progress Note      Name:  Edna Mcconnell /Age/Sex: 1935  (89 y.o. female)   MRN & CSN:  [id number] & [id number] Encounter Date/Time: 2025 12:16 PM EDT   Location:  [id number]-8614/3130-01 PCP: Cristy Neves MD     Attending:Malcolm Ribeiro MD       Hospital Day: 2    Assessment and Recommendations   Edna Mcconnell is a 89 y.o. female with pmh of  who presents with Closed fracture of neck of left femur (HCC)      Plan:     Patient is s/p left anterior total hip arthroplasty  Monitor CBC and BMP  Today morning patient is sitting in a chair she denies any pain  Her blood pressure is running soft, will give 1 L IV fluid NS bolus  Continue PT OT  Pain management  DVT prophylaxis as per orthopedic  ARU consulted    Took me more than 51  minutes for clinical management, coordination of care, reviewing labs , ordering medication as needed , reviewing consultants notes and recommendation  including plan of care discussion with the patient ,nursing staff and case management and consultants      Diet ADULT DIET; Regular   DVT Prophylaxis [] Lovenox, []  Heparin, [] SCDs, [] Ambulation,  [] Eliquis, [] Xarelto  [] Coumadin   Code Status Full Code   Disposition From:   Expected Disposition:   Estimated Date of Discharge:   Patient requires continued admission due to    Surrogate Decision Maker/ POA         Personally reviewed Lab Studies and Imaging      Discharge planning discussed with the case management in multidisciplinary round and plan is to discharge home vs rehab       Telemetry reviewed by myself no ST elevation         Drugs that require monitoring for toxicity include IVF   and the method of monitoring was CBC , BMP and vitals monitering         Medical Decision Making:  The following items were considered in medical decision making:  Discussion of patient care with other providers  Reviewed clinical lab tests  Reviewed radiology tests  Reviewed other diagnostic 
  [facility] - Physical Therapy   Phone: [phone]    Physical Therapy  Facility/Department:[address] ORTHOPEDICS    [x] Initial Evaluation            [] Daily Treatment Note         [] Discharge Summary      Patient: Edna Mcconnell   : 1935   MRN: [id number]   Date of Service:  2025  Staff Mobility Recommendation: stedy x 2 assist    wbat  left LE    anterior hip precautions     AM-PAC score:   Discharge Recommendations: ARU capable (may want facility near home in [facility])    Admitting Diagnosis: Closed fracture of neck of left femur (HCC)  Ordering Physician:   Current Admission Summary: here due to fall at home in garage on step   - baseline uses canes (daughter indicates should use walker)   PMH includes right and left TKRs, lumbar surgery    Past Medical History:  has a past medical history of Allergic rhinitis, Anxiety, Arthritis, Asthma, Back pain, GERD (gastroesophageal reflux disease), Goiter, Hyperlipidemia, Hypertension, MRSA (methicillin resistant staph aureus) culture positive, CARRINGTON (obstructive sleep apnea), Rash, and Thyroid disease.  Past Surgical History:  has a past surgical history that includes Diagnostic Cardiac Cath Lab Procedure; Hysterectomy; Cholecystectomy; Rotator cuff repair; knee surgery; Breast surgery; Wrist surgery; Cataract removal; lumbar fusion (2018); eye surgery; Dilation and curettage of uterus; Total knee arthroplasty (Right, 2021); and Total knee arthroplasty (Left, 2021).    Assessment  Activity Tolerance: Good  Impairments Requiring Therapeutic Intervention: decreased functional mobility, decreased ADL status, decreased strength, decreased balance, increased pain  Prognosis: good    Clinical Assessment:   -engages well for PT OT assessments   -pain is minimal at rest   -able to mobilize from bed via jami stedy to sit to recliner   -anticipate able to attempt up to walker at next session   -will need continued PT OT and 
  [facility] - Physical Therapy   Phone: [phone]    Physical Therapy  Facility/Department:[address] ORTHOPEDICS    [] Initial Evaluation            [x] Daily Treatment Note         [] Discharge Summary      Patient: Edna Mcconnell   : 1935   MRN: [id number]   Date of Service:  2025  Staff Mobility Recommendation: stedy x assist for distance to bathroom  -rolling walker recliner<>bed    wbat  left LE    anterior hip precautions     AM-PAC score:   Discharge Recommendations: ARU capable (may want facility near home in [facility])    Admitting Diagnosis: Closed fracture of neck of left femur (HCC)  Ordering Physician:   Current Admission Summary: here due to fall at home in garage on step   - baseline uses canes (daughter indicates should use walker)   PMH includes right and left TKRs, lumbar surgery    Past Medical History:  has a past medical history of Allergic rhinitis, Anxiety, Arthritis, Asthma, Back pain, GERD (gastroesophageal reflux disease), Goiter, Hyperlipidemia, Hypertension, MRSA (methicillin resistant staph aureus) culture positive, CARRINGTON (obstructive sleep apnea), Rash, and Thyroid disease.  Past Surgical History:  has a past surgical history that includes Diagnostic Cardiac Cath Lab Procedure; Hysterectomy; Cholecystectomy; Rotator cuff repair; knee surgery; Breast surgery; Wrist surgery; Cataract removal; lumbar fusion (2018); eye surgery; Dilation and curettage of uterus; Total knee arthroplasty (Right, 2021); Total knee arthroplasty (Left, 2021); and Total hip arthroplasty (Left, 7/15/2025).    Assessment  Activity Tolerance: Good  Impairments Requiring Therapeutic Intervention: decreased functional mobility, decreased ADL status, decreased strength, decreased balance, increased pain  Prognosis: good    Clinical Assessment:   -engages well for PTOT session   -able to get up to walker to start taking a few steps bed > recliner   -/Case 
4 Eyes Skin Assessment     NAME:  Edna Mcconnell  YOB: 1935  MEDICAL RECORD NUMBER:  [id number]    The patient is being assessed for  Admission    I agree that at least one RN has performed a thorough Head to Toe Skin Assessment on the patient. ALL assessment sites listed below have been assessed.      Areas assessed by both nurses: Mary FOSTER and Gage Duncan RN    Head, Face, Ears, Shoulders, Back, Chest, Arms, Elbows, Hands, Sacrum. Buttock, Coccyx, Ischium, Legs. Feet and Heels, and Under Medical Devices         Does the Patient have a Wound? No noted wound(s)       Nitin Prevention initiated by RN: No  Wound Care Orders initiated by RN: No    Pressure Injury (Stage 1,2,3,4, Unstageable, DTI, NWPT, and Complex wounds) if present, place Wound referral order by RN under : No    New Ostomies, if present place, Ostomy referral order under : No     Nurse 1 eSignature: Electronically signed by MARY SALAS RN on 7/15/25 at 12:11 PM EDT    **SHARE this note so that the co-signing nurse can place an eSignature**    Nurse 2 eSignature: Electronically signed by Perla Almonte RN on 7/15/25 at 5:23 PM EDT   
[facility] Orthopedic Surgery   Progress Note      S/P :  SUBJECTIVE  up in chair. Alert and orientnd. . Pain is   described in left hip and with the intensity of moderate. Pain is described as aching.       OBJECTIVE              Physical                      VITALS:  BP 96/62   Pulse 79   Temp 98.1 °F (36.7 °C) (Oral)   Resp 17   Ht 1.6 m (5' 3\")   Wt 90.4 kg (199 lb 4.7 oz)   SpO2 92%   BMI 35.30 kg/m²                     MUSCULOSKELETAL:  left foot NVI. Wiggles toes to command. Able to plantarflex and dorsiflex ankle Pedal pulses are palpable.                    NEUROLOGIC:                                  Sensory:  Touch:  Left Lower Extremity:  normal                                                 Surgical wound appears clean and dry left hip with Prineo dressing . Ice packs on.     Data       CBC:   Lab Results   Component Value Date/Time    WBC 9.6 07/17/2025 08:41 AM    RBC 3.01 07/17/2025 08:41 AM    HGB 9.5 07/17/2025 08:41 AM    HCT 27.7 07/17/2025 08:41 AM    MCV 92.2 07/17/2025 08:41 AM    MCH 31.4 07/17/2025 08:41 AM    MCHC 34.1 07/17/2025 08:41 AM    RDW 13.5 07/17/2025 08:41 AM     07/17/2025 08:41 AM    MPV 9.5 07/17/2025 08:41 AM        WBC:    Lab Results   Component Value Date/Time    WBC 9.6 07/17/2025 08:41 AM        Hemoglobin/Hematocrit:    Lab Results   Component Value Date/Time    HGB 9.5 07/17/2025 08:41 AM    HCT 27.7 07/17/2025 08:41 AM        PT/INR:    Lab Results   Component Value Date/Time    PROTIME 14.5 07/15/2025 09:48 AM    INR 1.11 07/15/2025 09:48 AM              Current Inpatient Medications             Current Facility-Administered Medications: acetaminophen (TYLENOL) tablet 650 mg, 650 mg, Oral, Q4H PRN  HYDROmorphone (DILAUDID) injection 0.5 mg, 0.5 mg, IntraVENous, Q4H PRN  HYDROcodone-acetaminophen (NORCO) 5-325 MG per tablet 1 tablet, 1 tablet, Oral, Q4H PRN  sodium chloride flush 0.9 % injection 5-40 mL, 5-40 mL, IntraVENous, 2 times per day  sodium chloride 
Checking on patient Q2H for nutrition needs, hygiene needs, comfort measures, mobility, fall risk interventions, and safe environment. All precautions and interventions in place. Educated patient on use of call light and telephone. Patient verbalizes understanding. Call light/telephone in reach.    
Checking on patient Q2H for nutrition needs, hygiene needs, comfort measures, mobility, fall risk interventions, and safe environment. All precautions and interventions in place. Educated patient on use of call light and telephone. Patient verbalizes understanding. Call light/telephone in reach.    
Department of Physical Medicine & Rehabilitation  Progress Note    Patient Identification:  Edna Mcconnell  [id number]  : 1935  Admit date: 7/15/2025    Chief Complaint: Closed fracture of neck of left femur (HCC)    Subjective:   No acute events overnight.   Patient seen this afternoon sitting up in room. She reports mild pain in the left hip at rest. Worse with activity. She tolerated therapy session ok today and was able to take a few steps. We discussed ARU level of care/expectations again and she is willing to do 3 hours therapy/day. Granddaughter was present at the bedside during our conversation.   Labs reviewed.     ROS: No f/c, n/v, cp     Objective:  Patient Vitals for the past 24 hrs:   BP Temp Temp src Pulse Resp SpO2   25 1042 122/68 -- -- -- -- --   25 1035 96/62 98.1 °F (36.7 °C) Oral 79 17 92 %   25 0844 108/67 98.1 °F (36.7 °C) Oral 63 16 93 %   25 0502 111/88 97.5 °F (36.4 °C) Oral 85 15 92 %   25 2305 104/86 98 °F (36.7 °C) Oral 76 14 99 %   25 2100 (!) 114/96 98.4 °F (36.9 °C) Oral 82 16 98 %   25 1838 (!) 112/45 98.4 °F (36.9 °C) Oral 94 16 95 %   25 1805 (!) 101/44 -- -- 90 -- --   25 1559 (!) 111/54 -- -- -- -- --   25 1555 (!) 80/45 -- -- 89 -- 90 %     Const: Alert. No distress, pleasant.   HEENT: Normocephalic, atraumatic. Normal sclera/conjunctiva. MMM.   CV: Regular rate and rhythm.   Resp: No respiratory distress. Lungs CTAB.   Abd: Soft, nontender, nondistended, NABS+   Ext: trace edema  MSK: Decreased spine and left hip ROM.   Neuro: Alert, oriented, forgetful  Psych: Cooperative, appropriate mood and affect    Laboratory data: Available via EMR.   Last 24 hour lab  Recent Results (from the past 24 hours)   Basic Metabolic Panel    Collection Time: 25  8:41 AM   Result Value Ref Range    Sodium 139 136 - 145 mmol/L    Potassium 3.9 3.5 - 5.1 mmol/L    Chloride 107 99 - 110 mmol/L    CO2 22 21 - 32 mmol/L    
Huddle performed including Physical therapist Elliott, and Occupational therapist Gay. Discussed plan of care, discharge plan, and dme needs for orthopedic total joint patient.     Electronically signed by MARY SALAS RN on 7/16/2025 at 9:34 AM   
Occupational Therapy    [facility] - Occupational Therapy   Phone: [phone]    Occupational Therapy  Facility/Department:[address] ORTHOPEDICS    [] Initial Evaluation            [x] Daily Treatment Note         [] Discharge Summary      Patient: Edna Mcconnell   : 1935   MRN: [id number]   Date of Service:  2025    Staff Mobility Recommendation: stedy x1. LLE WBAT Anterior Hip Precautions    AM-PAC Score: 14  Discharge Recommendations: Skilled OT 5-7x/week at moderate pace    Edna Mcconnell scored a 14 on the AM-PAC ADL Inpatient form. Current research shows that an AM-PAC score of 17 or less is typically not associated with a discharge to the patient's home setting. Based on the patient's AM-PAC score and their current ADL deficits, it is recommended that the patient have 5-7 sessions per week of Occupational Therapy at d/c to increase the patient's independence.  At this time, this patient demonstrates complex nursing, medical, and rehabilitative needs, and would benefit from intensive rehabilitation services upon discharge from the Inpatient setting.  This patient demonstrates the ability to participate in and benefit from an intensive therapy program with a coordinated interdisciplinary team approach to foster frequent, structured, and documented communication among disciplines, who will work together to establish, prioritize, and achieve treatment goals. Please see assessment section for further patient specific details.    If patient discharges prior to next session this note will serve as a discharge summary.  Please see below for the latest assessment towards goals.       Admitting Diagnosis:  Closed fracture of neck of left femur (HCC)  Ordering Physician: Lyle Castle MD   Current Admission Summary: 89 yo female admitted after a fall at [employer] steps with L femoral neck fx. S/p 7/15/25 L total hip arthroplasty now WBAT. PMH: spinal stenosis with fusion, delaney TKA, L rotator 
Occupational Therapy    [facility] - Occupational Therapy   Phone: [phone]    Occupational Therapy  Facility/Department:[address] ORTHOPEDICS    [x] Initial Evaluation            [] Daily Treatment Note         [] Discharge Summary      Patient: Edna Mcconnell   : 1935   MRN: [id number]   Date of Service:  2025    Staff Mobility Recommendation: stedy x2. LLE WBAT Anterior Hip Precautions    AM-PAC Score: 14  Discharge Recommendations: Skilled OT 5-7x/week at moderate pace    Edna Mcconnell scored a 14 on the AM-PAC ADL Inpatient form. Current research shows that an AM-PAC score of 17 or less is typically not associated with a discharge to the patient's home setting. Based on the patient's AM-PAC score and their current ADL deficits, it is recommended that the patient have 5-7 sessions per week of Occupational Therapy at d/c to increase the patient's independence.  At this time, this patient demonstrates complex nursing, medical, and rehabilitative needs, and would benefit from intensive rehabilitation services upon discharge from the Inpatient setting.  This patient demonstrates the ability to participate in and benefit from an intensive therapy program with a coordinated interdisciplinary team approach to foster frequent, structured, and documented communication among disciplines, who will work together to establish, prioritize, and achieve treatment goals. Please see assessment section for further patient specific details.    If patient discharges prior to next session this note will serve as a discharge summary.  Please see below for the latest assessment towards goals.       Admitting Diagnosis:  Closed fracture of neck of left femur (HCC)  Ordering Physician: Lyle Castle MD   Current Admission Summary: 89 yo female admitted after a fall at [employer] steps with L femoral neck fx. S/p 7/15/25 L total hip arthroplasty now WBAT. PMH: spinal stenosis with fusion, delaney TKA, L rotator 
Orders completed this shift:      [x] Surgical site and Neurovascular checks assessed with head to toe assessment and Q4Hr this shift per orders. See flowsheets for documentation.   [x] Insulin protocol implemented per orders, see eMAR for documentation.  [x] Patient  assisted with stedy X 2. See flowsheet for documentation on how patient tolerated ambulation.  [x] Ice pack/pad in place to surgical site. Barrier in place between patient skin and ice pack/pad.   [x]Pain medication administered per orders for management of pain. See eMAR for documentation.   [x] Incentive spirometer performed by patient. Volume achieved 2000. Encouraged patient to perform Q2hr while awake per order.  [x] No IV fluids ordered.    [x] Shift intake and output documented per shift, see flowsheet.  [x] Customized care plan and education charted on Qshift.    Electronically signed by MARY SALAS RN on 7/16/2025 at 9:33 AM   
Orders completed this shift:      [x] Surgical site and Neurovascular checks assessed with head to toe assessment and Q4Hr this shift per orders. See flowsheets for documentation.   [x] Patient  assisted with stedy 41 feet from chair to toilet back to chair. See flowsheet for documentation on how patient tolerated ambulation.  [x] Ice pack/pad in place to surgical site. Barrier in place between patient skin and ice pack/pad.   [x]Pain medication administered per orders for management of pain. See eMAR for documentation. Patient states pain has been well controlled this shift w/ PRN Tylenol, denies needs for stronger pain medication at this time.   [x] Incentive spirometer performed by patient. Volume achieved 2500. Encouraged patient to perform Q2hr while awake per order.  [x] IV fluids stopped per orders as patient is tolerating PO and has adequate urine output. See eMAR for documentation.   [x] Shift intake and output documented per shift, see flowsheet.  [x] Customized care plan and education charted on Qshift.      
Patient Alert and [place] x 3, Daughters at bedside. Patient resting comfortably up in the recliner, Patient Blood pressure is low this morning, RN rechecked multiple times. Attending Dr. Parry. Incision looks clean, dry, and Intact. No any drainage noted. Jimenez care completed. Took morning medication without complication. Care on going.     Electronically signed by MARY SALAS RN on 7/16/2025 at 11:34 AM   
Patient Alert and [place] x 4, Family at bedside. 4-eyes completed, No skin issue noted. Patient came with Jimenez Catheter.     Electronically signed by MARY SALAS RN on 7/15/2025 at 11:33 AM   
Patient Blood pressure in low Range, RN concerned and message Attending  IV fluids ordered. IV fluids infusing as ordered. Care on going.     Electronically signed by MARY SALAS RN on 7/16/2025 at 12:48 PM   
Patient daughter at bedside concerning about patient's past medical History like On antibiotics for long time after she had back surgery, Urinary retention as well, patient got Dizziness when she came out of BR. PCT was helping patient back to bed. While RN, doing rounding family concerned about patient Vitals. RN took vitals, Blood pressure on low range. RN perfect serve attending Dr. About family concern as well patient's current vitals. Waiting to hear from DrAdolph And new orders. Care on going.     Electronically signed by MARY SALAS RN on 7/16/2025 at 4:14 PM    
[facility] [place] [facility] the R AC removed prior to patient departure from unit. Patient's granddaughter at the bedside took all belongings including purse & flowers at the time of discharge. Patient transported in gown & non skid socks. Patient pre-medicated w/ PRN Norco immediately prior to departure from unit. VSS at the time of discharge.     Patient denies needs from this RN prior to discharge.   
Patient resting in chair this morning with complaint of 5/10 pain to the L hip. Scheduled morning medications + PRN Tylenol administered per orders. See eMAR for documentation of medication administration. Patient tolerated medications whole w/ water w/o complication. IV to the R AC flushed w/o complication and is saline locked at this time. Alcohol cap in place.     Head to toe assessment completed and charted. See flowsheets for documentation. Skin prep applied to patient's bilateral heels for blanchable redness.     Surgical incision to L hip clean, dry, and intact. Perineo in place. Moderate amounts of bruising / blanchable redness surrounding incision. Fresh ice pack in place to L hip w/ gown between skin and ice pack to prevent skin breakdown.     Patient updated with plan of care for the shift, denies questions. Patient denies further physical/emotional needs at this time. Call light, telephone, and bed side table are within reach. Fall precautions in place. Will continue to monitor and assess.     
Patient returned to the unit from PACU without incident, A&O X4. VSS. PIV flushed, dressing CDI, NS locked and capped at this time. Patient denies pain. Patient tolerating PO intake and dinner tray ordered from dietary. Dressing to Left hip CDI, ice applied for comfort.  No other needs verbalized at this time. Standard safety precautions in place and call light within reach.   
Pt awake and alert.  Drsg to left hip intact with ice.  Pain tolerable at 3/10.  Iv infusing.  Vss.  Report called to floor.  
Pt to pacu sleeping.  Vss.  Drsg to left hip intact.  Ice applied.  + circ checks.  Iv infusing.  
Report called to Alec Hull. All questions posed to this RN were answered. Kurtis aware of 1630 transport time.   
Sandhu catheter removed per protocol. Patient tolerated w/o complication. Patient educated on need to call out when she feels the need to void. Patient reports understanding.     PCA updated that sandhu catheter has been removed.   
The patients OARRS report has been reviewed online and any prescribing of pain related medications is based on our findings.The patient has been issued narcotics to safely reduce postoperative pain and promote tolerance with physical therapies and ADL's. Reduction in dosing will be addressed with the next narcotic refill request. Dosing is adjusted for patients with history of chronic pain disorders.    
There was No Pain medication order. RN perfect serve attending  Waiting for new orders. Care on going.     Electronically signed by MARY SALAS RN on 7/15/2025 at 12:19 PM   
chloride flush 0.9 % injection 5-40 mL, 5-40 mL, IntraVENous, 2 times per day  sodium chloride flush 0.9 % injection 5-40 mL, 5-40 mL, IntraVENous, PRN  0.9 % sodium chloride infusion, , IntraVENous, PRN  enoxaparin Sodium (LOVENOX) injection 30 mg, 30 mg, SubCUTAneous, Nightly  DULoxetine (CYMBALTA) extended release capsule 60 mg, 60 mg, Oral, Daily  metoprolol succinate (TOPROL XL) extended release tablet 50 mg, 50 mg, Oral, Daily  pantoprazole (PROTONIX) tablet 40 mg, 40 mg, Oral, QAM AC  pravastatin (PRAVACHOL) tablet 20 mg, 20 mg, Oral, Daily  furosemide (LASIX) tablet 20 mg, 20 mg, Oral, Daily    ASSESSMENT AND PLAN    Post left hip hemiarthroplasty, stable exam  DVT prophylaxis ordered  PT OT for ADL's and ambulation as tolerated  SS for DC planning, ECF/Rehab needed.   IV or PO pain med as ordered    Noted low sodium, Significant drop from preop. Will need to watch.     Vicki Da Silva, CYNTHIA - CNP  7/16/2025  12:56 PM

## 2025-07-17 NOTE — PLAN OF CARE
Problem: Discharge Planning  Goal: Discharge to home or other facility with appropriate resources  7/16/2025 2108 by Lesly Prado RN  Outcome: Progressing  Flowsheets (Taken 7/16/2025 2000)  Discharge to home or other facility with appropriate resources: Identify barriers to discharge with patient and caregiver  7/16/2025 1034 by Librado Meza RN  Flowsheets (Taken 7/16/2025 0330 by Lesly Prado RN)  Discharge to home or other facility with appropriate resources: Identify barriers to discharge with patient and caregiver     Problem: Safety - Adult  Goal: Free from fall injury  7/16/2025 2108 by Lesly Prado RN  Outcome: Progressing  Flowsheets (Taken 7/16/2025 2107)  Free From Fall Injury: Instruct family/caregiver on patient safety  7/16/2025 1034 by Librado Meza RN  Flowsheets (Taken 7/16/2025 0739)  Free From Fall Injury: Instruct family/caregiver on patient safety     Problem: Pain  Goal: Verbalizes/displays adequate comfort level or baseline comfort level  7/16/2025 2108 by Lesly Prado RN  Outcome: Progressing  Flowsheets (Taken 7/16/2025 2100)  Verbalizes/displays adequate comfort level or baseline comfort level: Assess pain using appropriate pain scale  7/16/2025 1034 by Librado Meza RN  Flowsheets (Taken 7/16/2025 0330 by Lesly Prado RN)  Verbalizes/displays adequate comfort level or baseline comfort level: Assess pain using appropriate pain scale     Problem: Skin/Tissue Integrity  Goal: Skin integrity remains intact  Description: 1.  Monitor for areas of redness and/or skin breakdown  2.  Assess vascular access sites hourly  3.  Every 4-6 hours minimum:  Change oxygen saturation probe site  4.  Every 4-6 hours:  If on nasal continuous positive airway pressure, respiratory therapy assess nares and determine need for appliance change or resting period  7/16/2025 2108 by Lesly Prado RN  Outcome: Progressing  Flowsheets  Taken 7/16/2025

## 2025-07-17 NOTE — DISCHARGE SUMMARY
Hospital Medicine Discharge Summary    Patient ID: Edna Mcconnell      Patient's PCP: Cristy Neves MD    Admit Date: 7/15/2025     Discharge Date:   7/17/25    Admitting Physician: Kendell Garza MD     Discharge Physician: Malcolm Ribeiro MD     Discharge Diagnoses:       Active Hospital Problems    Diagnosis     Closed fracture of neck of left femur (HCC) [S72.002A]        The patient was seen and examined on day of discharge and this discharge summary is in conjunction with any daily progress note from day of discharge.    Hospital Course:     Edna Mcconnell is a 89 y.o. female with pmh of  who presents   With fall leading to her left hip fracture  Patient is being transferred from [facility] to [facility] for OR  Patient is n.p.o.  She denies any chest pain or shortness of breath  She is not in acute respiratory distress  Denies upper or lower GI bleed or nausea or vomiting    Following surgery done on 7/15    Date of Procedure: 7/15/2025      Pre-Op Diagnosis:    Acute displaced left femoral neck fracture  Left hip osteoarthritis     Post-Op Diagnosis: Same       Procedure Performed: Left anterior total hip arthroplasty     Surgeon: Lyle Castle MD     Physician Assistant: BRYSON Burdick     Anesthesia: General     Estimated Blood Loss: 200 mL      Complications: None     Implants:  Depuy Emphasys cup, 48 mm  36 mm polyethylene liner, +4 mm offset  Depuy Actis stem, size 10 standard offset  36 mm diameter ceramic femoral head, +1.5 mm offset  Synthes cable x 1    Post left hip hemiarthroplasty, stable exam  DVT prophylaxis ordered  PT OT for ADL's and ambulation as tolerated  SS for DC planning, ECF/Rehab needed.   IV or PO pain med as ordered      7/17    Serum Na wnl  BP wnl      Denies abdominal pain , nausea, vomiting , overt GI bleed , fever , chills       Clinically stable  Vital signs stable  Denies chest pain or sob      Physical Exam Performed:

## 2025-07-17 NOTE — PROGRESS NOTES
IRF DAMIAN Admission Assessment  [facility] Acute Rehab Unit    Patient:Edna Mcconnell     Rehab Dx/Hx: Closed left hip fracture, initial encounter ([facility]) [S72.002A]   :1935  MRN:[id number]  Date of Admit: 2025     Pain Effect on Sleep:  Over the past 5 days, how much of the time has pain made it hard for you to sleep at night?  []  0. Does not apply - I have not had any pain or hurting in the past 5 days  []  1. Rarely or not at all  [x]  2. Occasionally  []  3. Frequently  []  4. Almost constantly  []  8. Unable to answer    Over the past 5 days, how often have you limited your participation in rehabilitation therapy sessions due to pain?  []  0. Does not apply - I have not received rehabilitation therapy in the past 5 days  []  1. Rarely or not at all  [x]  2. Occasionally  []  3. Frequently  []  4. Almost constantly  []  8. Unable to answer    Pain Interference with Day-to-Day Activities:  Over the past 5 days, how often have you limited your day-to-day activities (excluding rehabilitation therapy session)?  []  1. Rarely or not at all  [x]  2. Occasionally  []  3. Frequently  []  4. Almost constantly  []  8. Unable to answer      High-Risk Drug Classes: Use and Indication   Is taking: Check if the pt is taking any medications by pharmacological classification  Indication noted: If column 1 is checked, check if there is an indication noted for all meds in the drug class Is taking  (check all that apply) Indication noted (check all that apply)   Antipsychotic [] []   Anticoagulant [x] [x]   Antibiotic [] []   Opioid [x] [x]   Antiplatelet [] []   Hypoglycemic (including insulin) [] []   None of the above [] []     Special Treatments, Procedures, and Programs   Check all of the following treatments, procedures, and programs that apply on admission.  On admission (check all that apply)   Cancer Treatments    A1. Chemotherapy []   A2. IV []   A3. Oral []   A10. Other []   B1. Radiation []

## 2025-07-17 NOTE — H&P
Patient: Edna Mcconnell  [id number]  Date: 7/18/2025      Chief Complaint: left hip pain    History of Present Illness/Hospital Course:  Edna Mcconnell is an 89 year old female with a past medical history significant for CKD stage 3, anxiety, asthma, GERD, HTN, HLD, CARRINGTON, and obesity who initially presented to [facility] ER on 7/15/25 after a fall at home with left hip pain. XR showed impacted left femoral neck fracture. She was initially transferred to [facility]. Orthopedic Surgery was consulted and she was then transferred to [facility] due to OR time availability. On 7/15 she underwent left total hip arthroplasty. She is WBAT to the E with anterior hip precautions. Hospital course notable for acute hypoxia, mild leukocytosis, and pain. She was admitted to [facility] on 7/17/25 due to functional deficits below her baseline. Today Alina is seen in her room without family present. She reports some continued left hip pain. She reports being independent with a cane or walker at baseline. [place] single level house with 1 LIZZY.     Prior Level of Function:  Independent for self care, indoor mobility, stairs, functional cognition     Current Level of Function:  Dependent for lying to sitting on side of bed, sit to stand     has a past medical history of Allergic rhinitis, Anxiety, Arthritis, Asthma, Back pain, GERD (gastroesophageal reflux disease), Goiter, Hyperlipidemia, Hypertension, MRSA (methicillin resistant staph aureus) culture positive, CARRINGTON (obstructive sleep apnea), Rash, and Thyroid disease.     has a past surgical history that includes Diagnostic Cardiac Cath Lab Procedure; Hysterectomy; Cholecystectomy; Rotator cuff repair; knee surgery; Breast surgery; Wrist surgery; Cataract removal; lumbar fusion (08/2018); eye surgery; Dilation and curettage of uterus; Total knee arthroplasty (Right, 05/17/2021); Total knee arthroplasty (Left, 08/16/2021); and Total hip  bilaterally, no rales wheezes or ronchi, no retractions. Respirations unlabored.   GI: Soft, nontender, nondistended. Normal bowel sounds. No palpable masses.   Neuro: Alert, oriented, appropriate. No cranial nerve deficits appreciated. Sensation intact to light touch. Motor examination reveals normal strength in all four limbs diffusely.   Skin: Normal temperature and turgor  MSK: No joint abnormalities noted.     Ext: No significant edema appreciated. No varicosities.    Lab Results   Component Value Date    WBC 10.6 07/18/2025    HGB 9.6 (L) 07/18/2025    HCT 28.6 (L) 07/18/2025    MCV 94.0 07/18/2025     07/18/2025     Lab Results   Component Value Date    INR 1.11 07/15/2025    INR 1.06 06/29/2021    INR 0.97 04/26/2021    PROTIME 14.5 07/15/2025    PROTIME 12.3 06/29/2021    PROTIME 11.3 04/26/2021     Lab Results   Component Value Date    CREATININE 1.1 07/18/2025    BUN 22 (H) 07/18/2025     07/18/2025    K 4.1 07/18/2025     07/18/2025    CO2 23 07/18/2025     No results found for: \"ALT\", \"AST\", \"GGT\", \"ALKPHOS\", \"BILITOT\"    Most recent EKG revealed normal sinus rhythm.     IMAGING    XR Hip 7/15/25  Total hip arthropasty without acute hardware complication.     XR Pelvis 7/15/25  1. Left femoral neck fracture with impaction.     The above laboratory data have been reviewed.   The above imaging data have been reviewed.   The above medical testing have been reviewed.     Body mass index is 30.77 kg/m².    Barriers to Discharge: level of assistance, endurance, pain, comorbidities     POST ADMISSION PHYSICIAN EVALUATION  The patient has agreed to being admitted to our comprehensive inpatient rehabilitation facility consisting of at least 180 minutes of therapy a day, 5 out of 7 days a week.     The patient/family has a good understanding of our discharge process. The patient has potential to make improvement and is in need of at least two of the following multidisciplinary therapies  including but not limited to physical, occupational, respiratory, and speech, nutritional services, wound care, and prosthetics and orthotics. Given the patients complex condition and risk of further medical complications, rehabilitation services cannot be safely provided at a lower level of care such as a skilled nursing facility. I have compared the patients medical and functional status at the time of the preadmission screening and the same on this date, and there are no significant changes.     By signing this document, I acknowledge that I have personally performed a full physical examination on this patient within 24 hours of admission to this inpatient rehabilitation facility and have determined the patient to be able to tolerate the above course of treatment at an intensive level for a reasonable period of time. I will be completing a detailed individualized Plan of Care for this patient by day four of the patients stay based upon the Preadmission Screen, this Post-Admission Evaluation, and the therapy evaluations.    Rehabilitation Diagnosis:  Orthopedic, 8.11, Unilateral Hip Fracture    Assessment and Plan:  Edna Mcconnell is an 89 year old female with a past medical history significant for CKD stage 3, anxiety, asthma, GERD, HTN, HLD, CARRINGTON, and obesity who initially presented to [facility] ER on 7/15/25 after a fall at home with left hip pain, found to have left hip fracture subsequently transferred to [facility] then [facility], now s/p left total hip arthroplasty. She was admitted to [facility] on 7/17/25 due to functional deficits below her baseline.    Left hip fracture  - s/p left anterior total hip arthroplasty  - incision care  - DVT with lovenox while admitted, on discharge transition to asa 81 mg BID for 30 days  - multimodal pain control  - PT, OT    Acute blood loss anemia  - monitor and transfuse for Hb<7    HTN  - home regimen: toprol 50 mg daily, imdur 30 mg daily, HCTZ 25 mg daily, lasix 20

## 2025-07-17 NOTE — PROGRESS NOTES
ASSESSMENT: ARU ADMISSION  [facility]    Patient:Edna Mcconnell     Rehab Dx/Hx: Closed left hip fracture, initial encounter ([facility]) [S72.002A]   :1935  MRN:[id number]  Date of Admit: 2025  Room #: 0158/0158-01    Subjective:   Patient admitted to room 158 @ 1815 from [facility] via stretcher.   Alert and oriented x4.  Oriented to room and call light system. Oriented to rehab routine and therapy schedules. Informed about care conferences and ordering of meals.    Drug / Medication Review:   Medications were reviewed by RN at time of admission  [x]  No potential or actual clinically significant medication issues were noted.   []  Potential or actual clinically significant medication issues were found and MD was notified. (Specified below if applicable)   []  Allergy to medication   []  Drug interactions (drug/drug, drug/food, drug/disease interactions)   []  Duplicate drug   []  Omission (drug missing from prescribed regimen)   []  Non adherence   []  Adverse reaction   []  Wrong patient, drug, dose, route, time error   []  Ineffective drug therapy    Section GG: Eating   []  Code 06, Independent: if the patient completes the activity by themself with no assistance from a helper.   [x]  Code 05, Setup or clean up assist: if the helper sets up or cleans up; patient completes activity   []  Code 04, Supervision or touching assist: If the helper provides verbal cues and/or touching/steadying and/or contact guard assistance as patient completes activity   []  Code 03, Partial/Moderate Assist: If the helper does LESS THAN HALF the effort. [place] lifts, holds, or supports trunk or limbs, but provides less than half the effort.   []  Code 02, Substantial/Maximal Assist: if the helper does MORE THAN HALF the effort. [place] lifts or holds trunk or limbs and provides more than half the effort.  []  Code 01,Dependent: If the helper does ALL of the effort. Patient does none of the effort to

## 2025-07-17 NOTE — PLAN OF CARE
Problem: Discharge Planning  Goal: Discharge to home or other facility with appropriate resources  Outcome: Progressing  Flowsheets (Taken 7/17/2025 1327)  Discharge to home or other facility with appropriate resources:   Identify barriers to discharge with patient and caregiver   Refer to discharge planning if patient needs post-hospital services based on physician order or complex needs related to functional status, cognitive ability or social support system   Arrange for needed discharge resources and transportation as appropriate     Problem: Safety - Adult  Goal: Free from fall injury  Outcome: Progressing  Flowsheets (Taken 7/17/2025 1327)  Free From Fall Injury: Instruct family/caregiver on patient safety     Problem: Pain  Goal: Verbalizes/displays adequate comfort level or baseline comfort level  Outcome: Progressing  Flowsheets (Taken 7/17/2025 1327)  Verbalizes/displays adequate comfort level or baseline comfort level:   Encourage patient to monitor pain and request assistance   Consider cultural and social influences on pain and pain management   Administer analgesics based on type and severity of pain and evaluate response   Assess pain using appropriate pain scale   Implement non-pharmacological measures as appropriate and evaluate response   Notify Licensed Independent Practitioner if interventions unsuccessful or patient reports new pain     Problem: Skin/Tissue Integrity  Goal: Skin integrity remains intact  Description: 1.  Monitor for areas of redness and/or skin breakdown  2.  Assess vascular access sites hourly  3.  Every 4-6 hours minimum:  Change oxygen saturation probe site  4.  Every 4-6 hours:  If on nasal continuous positive airway pressure, respiratory therapy assess nares and determine need for appliance change or resting period  Outcome: Progressing  Flowsheets (Taken 7/17/2025 1327)  Skin Integrity Remains Intact:   Monitor for areas of redness and/or skin breakdown   Check visual

## 2025-07-18 LAB
ANION GAP SERPL CALCULATED.3IONS-SCNC: 9 MMOL/L (ref 3–16)
BASOPHILS # BLD: 0 K/UL (ref 0–0.2)
BASOPHILS NFR BLD: 0.4 %
BUN SERPL-MCNC: 22 MG/DL (ref 7–20)
CALCIUM SERPL-MCNC: 8 MG/DL (ref 8.3–10.6)
CHLORIDE SERPL-SCNC: 107 MMOL/L (ref 99–110)
CO2 SERPL-SCNC: 23 MMOL/L (ref 21–32)
CREAT SERPL-MCNC: 1.1 MG/DL (ref 0.6–1.2)
DEPRECATED RDW RBC AUTO: 13.7 % (ref 12.4–15.4)
EOSINOPHIL # BLD: 0.4 K/UL (ref 0–0.6)
EOSINOPHIL NFR BLD: 3.8 %
GFR SERPLBLD CREATININE-BSD FMLA CKD-EPI: 48 ML/MIN/{1.73_M2}
GLUCOSE SERPL-MCNC: 117 MG/DL (ref 70–99)
HCT VFR BLD AUTO: 28.6 % (ref 36–48)
HGB BLD-MCNC: 9.6 G/DL (ref 12–16)
LYMPHOCYTES # BLD: 1.6 K/UL (ref 1–5.1)
LYMPHOCYTES NFR BLD: 15.2 %
MCH RBC QN AUTO: 31.6 PG (ref 26–34)
MCHC RBC AUTO-ENTMCNC: 33.6 G/DL (ref 31–36)
MCV RBC AUTO: 94 FL (ref 80–100)
MONOCYTES # BLD: 1 K/UL (ref 0–1.3)
MONOCYTES NFR BLD: 9.3 %
NEUTROPHILS # BLD: 7.6 K/UL (ref 1.7–7.7)
NEUTROPHILS NFR BLD: 71.3 %
PLATELET # BLD AUTO: 200 K/UL (ref 135–450)
PMV BLD AUTO: 9.5 FL (ref 5–10.5)
POTASSIUM SERPL-SCNC: 4.1 MMOL/L (ref 3.5–5.1)
RBC # BLD AUTO: 3.04 M/UL (ref 4–5.2)
SODIUM SERPL-SCNC: 139 MMOL/L (ref 136–145)
WBC # BLD AUTO: 10.6 K/UL (ref 4–11)

## 2025-07-18 PROCEDURE — 80048 BASIC METABOLIC PNL TOTAL CA: CPT

## 2025-07-18 PROCEDURE — 1280000000 HC REHAB R&B

## 2025-07-18 PROCEDURE — 97530 THERAPEUTIC ACTIVITIES: CPT

## 2025-07-18 PROCEDURE — 6370000000 HC RX 637 (ALT 250 FOR IP): Performed by: STUDENT IN AN ORGANIZED HEALTH CARE EDUCATION/TRAINING PROGRAM

## 2025-07-18 PROCEDURE — 51798 US URINE CAPACITY MEASURE: CPT

## 2025-07-18 PROCEDURE — 92526 ORAL FUNCTION THERAPY: CPT

## 2025-07-18 PROCEDURE — 97166 OT EVAL MOD COMPLEX 45 MIN: CPT

## 2025-07-18 PROCEDURE — 36415 COLL VENOUS BLD VENIPUNCTURE: CPT

## 2025-07-18 PROCEDURE — 85025 COMPLETE CBC W/AUTO DIFF WBC: CPT

## 2025-07-18 PROCEDURE — 6360000002 HC RX W HCPCS: Performed by: STUDENT IN AN ORGANIZED HEALTH CARE EDUCATION/TRAINING PROGRAM

## 2025-07-18 PROCEDURE — 92610 EVALUATE SWALLOWING FUNCTION: CPT

## 2025-07-18 PROCEDURE — 97162 PT EVAL MOD COMPLEX 30 MIN: CPT

## 2025-07-18 RX ADMIN — PRAVASTATIN SODIUM 20 MG: 20 TABLET ORAL at 10:04

## 2025-07-18 RX ADMIN — HYDROCODONE BITARTRATE AND ACETAMINOPHEN 1 TABLET: 5; 325 TABLET ORAL at 13:57

## 2025-07-18 RX ADMIN — PANTOPRAZOLE SODIUM 40 MG: 40 TABLET, DELAYED RELEASE ORAL at 05:11

## 2025-07-18 RX ADMIN — DULOXETINE 60 MG: 60 CAPSULE, DELAYED RELEASE ORAL at 10:05

## 2025-07-18 RX ADMIN — FUROSEMIDE 20 MG: 20 TABLET ORAL at 10:05

## 2025-07-18 RX ADMIN — HYDROCODONE BITARTRATE AND ACETAMINOPHEN 1 TABLET: 5; 325 TABLET ORAL at 17:53

## 2025-07-18 RX ADMIN — METOPROLOL SUCCINATE 50 MG: 50 TABLET, EXTENDED RELEASE ORAL at 10:04

## 2025-07-18 RX ADMIN — ENOXAPARIN SODIUM 30 MG: 100 INJECTION SUBCUTANEOUS at 20:39

## 2025-07-18 RX ADMIN — HYDROCODONE BITARTRATE AND ACETAMINOPHEN 1 TABLET: 5; 325 TABLET ORAL at 05:49

## 2025-07-18 ASSESSMENT — PAIN DESCRIPTION - DESCRIPTORS
DESCRIPTORS: ACHING;DISCOMFORT
DESCRIPTORS: ACHING;DISCOMFORT
DESCRIPTORS: ACHING;JABBING

## 2025-07-18 ASSESSMENT — PAIN DESCRIPTION - LOCATION
LOCATION: LEG
LOCATION: LEG
LOCATION: HIP;LEG

## 2025-07-18 ASSESSMENT — PAIN SCALES - GENERAL
PAINLEVEL_OUTOF10: 6
PAINLEVEL_OUTOF10: 4
PAINLEVEL_OUTOF10: 6

## 2025-07-18 ASSESSMENT — PAIN DESCRIPTION - ORIENTATION
ORIENTATION: LEFT

## 2025-07-18 ASSESSMENT — PAIN - FUNCTIONAL ASSESSMENT
PAIN_FUNCTIONAL_ASSESSMENT: PREVENTS OR INTERFERES SOME ACTIVE ACTIVITIES AND ADLS
PAIN_FUNCTIONAL_ASSESSMENT: ACTIVITIES ARE NOT PREVENTED
PAIN_FUNCTIONAL_ASSESSMENT: ACTIVITIES ARE NOT PREVENTED

## 2025-07-18 NOTE — CARE COORDINATION
Case Management ARU Admission Assessment   [facility]    Patient:Edna Mcconnell     Rehab Dx/Hx: Closed left hip fracture, initial encounter ([facility]) [S72.002A]   :1935  MRN:[id number]  Date of Admit: 2025  Room #: 0158/0158-01       Objective:  met with the patient to complete initial assessment and review the role of Case Management while on the ARU. Patient educated on team conferences. Discussed family training with the patient/family on how it is encouraged on the unit. Order for \"discharge planning\" has been addressed.          Family Present: Daughter Yudith   Primary : Daughter Yudith Rojas [phone]   Health Care Decision Maker:   Primary Decision Maker: Anival Mcconnell - Spouse - [phone]    Secondary Decision Maker: Yudith Rojas - Child - [phone]    Secondary Decision Maker: CharlesAntonietta fitzgerald - Child - [phone]   Current PCP:  Cristy Neves       Admit date:  2025   Insurance: Medicare A&B / [facility] Medicare Supp   Precert required for SNF:  [x] No       [] Yes   3 Night stay required: [] No       [x] Yes   Admitting diagnosis: Closed left hip fracture, initial encounter ([facility]) [S72.002A]       Current home situation: Lives w/  in a ranch-style house [place] a farm w/ 2STE   DME at home: [x] Walker     [x] Cane       [] RTS        [] BSC      [] Shower Chair        [] O2       [] HHN     [] CPAP       [] BiPap      [] Hospital Bed       [] W/C        [x] Other: lift chair    Medical concerns requiring training: Continue to assess   Medication concerns:  Require financial assistance with meds? [x] No       [] If yes, please explain:   [] Yes              Services prior to admission: none   Preference for HHC or OP Therapy: [x] Home health       [] Outpatient       [] No preference  Has used HHC in the past -unable to remember agency name   Patient's goal(s): \"Be as independent as possible\"   Working or volunteering PTA? Not

## 2025-07-18 NOTE — PROGRESS NOTES
Speech Language Pathology  Clinical Bedside Swallow Assessment  Facility/Department: [facility]        Recommendations:  Diet recommendation: IDDSI 7 Regular Solids; IDDSI 0 Thin Liquids; Meds PO as tolerated  Instrumentation: MBSS is recommended to further assess oropharyngeal structures and functions    Risk management: upright for all intake, stay upright for at least 30 mins after intake, small bites/sips, distant supervision with intake, oral care 2-3x/day to reduce adverse affects in the event of aspiration, increase physical mobility as able, alternate bites/sips, slow rate of intake, STRICT aspiration precautions, and hold PO and contact SLP if s/s of aspiration or worsening respiratory status develop.      NAME:dEna Mcconnell  : 1935 (89 y.o.)   MRN: [id number]  ROOM: [facility]0158-  ADMISSION DATE: 2025  PATIENT DIAGNOSIS(ES): Closed left hip fracture, initial encounter ([facility]) [S72.002A]  No chief complaint on file.    Patient Active Problem List    Diagnosis Date Noted    Closed left hip fracture, initial encounter ([facility]) 2025    Hip fracture requiring operative repair, left, sequela 07/15/2025    Closed fracture of neck of left femur ([facility]) 07/15/2025    S/P total knee arthroplasty, right 2024    Arthritis of right hip 2024    Arthritis of left hip 2024    Status post total left knee replacement 2021    Hyperthyroidism     Lumbosacral spondylosis without myelopathy 2015    Displacement of lumbar intervertebral disc without myelopathy 2015    Degeneration of lumbar or lumbosacral intervertebral disc 2015    Spinal stenosis, lumbar region, without neurogenic claudication 2015    CARRINGTON (obstructive sleep apnea) 2013    HTN (hypertension) 2013    Obesity 2013     Past Medical History:   Diagnosis Date    Allergic rhinitis     Anxiety     Arthritis     Asthma     Back pain     GERD (gastroesophageal reflux disease)     Goiter      Hyperlipidemia     Hypertension     MRSA (methicillin resistant staph aureus) culture positive     CARRINGTON (obstructive sleep apnea)     Rash     non-cancerous moles removed    Thyroid disease      Past Surgical History:   Procedure Laterality Date    BREAST SURGERY      CATARACT REMOVAL      CHOLECYSTECTOMY      DIAGNOSTIC CARDIAC CATH LAB PROCEDURE      DILATION AND CURETTAGE OF UTERUS      EYE SURGERY      HYSTERECTOMY (CERVIX STATUS UNKNOWN)      KNEE SURGERY      LUMBAR FUSION  08/2018    ROTATOR CUFF REPAIR      TOTAL HIP ARTHROPLASTY Left 7/15/2025    LEFT HIP TOTAL ARTHROPLASTY performed by Lyle Castle MD at [facility] OR    TOTAL KNEE ARTHROPLASTY Right 05/17/2021    RIGHT TOTAL KNEE REPLACEMENT performed by Ken Pedroza MD at [facility] OR    TOTAL KNEE ARTHROPLASTY Left 08/16/2021    LEFT TOTAL KNEE REPLACEMENT performed by Ken Pedroza MD at [facility] OR    WRIST SURGERY       No Known Allergies    DATE ONSET: 7/17/2025    Date of Evaluation: 7/18/2025   Evaluating Therapist: ZORAIDA Chambers    Chart Reviewed: : [x] Yes [] No     Pain: The patient does not complain of pain     Current Diet: ADULT DIET; Regular      Most Recent Imaging  N/A (here for hip related deficits - no neurology intervention)          Reason for Referral  Edna Mcconnell was referred for a bedside swallow evaluation to assess the efficiency of their swallow function, identify signs and symptoms of aspiration and make recommendations regarding safe dietary consistencies, effective compensatory strategies, and safe eating environment.    Assessment    Medical record review/interview: Per MD H&P on 7/18/25:     \" History of Present Illness/Hospital Course:  Edna Mcconnell is an 89 year old female with a past medical history significant for CKD stage 3, anxiety, asthma, GERD, HTN, HLD, CARRINGTON, and obesity who initially presented to [facility] ER on 7/15/25 after a fall at home with left hip pain. XR showed impacted left femoral neck  subjectively appears timely, oral clearance grossly WFL, no clinical s/s of aspiration, and no coughing    IDDSI 6 (soft and bite sized): no anterior bolus loss , swallow timing subjectively appears timely, oral clearance grossly WFL, no clinical s/s of aspiration, and no coughing    IDDSI 7 (regular): no anterior bolus loss , swallow timing subjectively appears timely, grossly functional mastication, prolonged mastication, oral stasis noted post swallow, and delayed cough   - Reports mild globus sensation     3 oz water: DNT      Dysphagia Diagnosis: Mild oral stage dysphagia, Mild pharyngeal stage dysphagia , and Suspected needs further assessment        Impressions:  Pt seen sitting up[right in bedside chair w/ daughter in room. SLP provided extensive education regarding reasoning for evaluation, s/s of aspiration, possible causes of dysphagia, the anatomy and physiology of the swallow and swallow mechanisms, impact of breathing swallow coordination on swallow safety, the aging swallow, etc. Pt and daughter both verbalized understanding   SLP provided PO intake listed above. Pt w/ delayed cough following regular solid trial and reported mild globus sensation. Pt also w/ poor oral clearance of solid d/t dryness and required x2 liquid wash to clear.   SLP provided extensive education regarding safe swallow strategies. Pt and daughter verbalized understanding.   SLP recs pt remain on reg/tin diet but recs pt chooses softer food and that meals come w/ extra sauce/gravy to ensure regular solids of moist enough for consumption and utilizing slow rate of intake and alternate bites / sips.   Pt would also likely benefit from completion of MBSS to further assess swallow function and safety d/t reported s/s, reported SOB w/ PO intake, globus sensation, etc. These s/s place pt at increased risk of aspiration.       Barriers to home discharge: No overt concerns       Prognosis: Good

## 2025-07-18 NOTE — PROGRESS NOTES
Physical Therapy  Facility/Department: [facility]  Rehabilitation Physical Therapy Initial Assessment    NAME: Edna Mcconnell  : 1935 (89 y.o.)  MRN: [id number]  CODE STATUS: Full Code    Date of Service: 25      Past Medical History:   Diagnosis Date    Allergic rhinitis     Anxiety     Arthritis     Asthma     Back pain     GERD (gastroesophageal reflux disease)     Goiter     Hyperlipidemia     Hypertension     MRSA (methicillin resistant staph aureus) culture positive     CARRINGTON (obstructive sleep apnea)     Rash     non-cancerous moles removed    Thyroid disease      Past Surgical History:   Procedure Laterality Date    BREAST SURGERY      CATARACT REMOVAL      CHOLECYSTECTOMY      DIAGNOSTIC CARDIAC CATH LAB PROCEDURE      DILATION AND CURETTAGE OF UTERUS      EYE SURGERY      HYSTERECTOMY (CERVIX STATUS UNKNOWN)      KNEE SURGERY      LUMBAR FUSION  2018    ROTATOR CUFF REPAIR      TOTAL HIP ARTHROPLASTY Left 7/15/2025    LEFT HIP TOTAL ARTHROPLASTY performed by Lyle Castle MD at [facility] OR    TOTAL KNEE ARTHROPLASTY Right 2021    RIGHT TOTAL KNEE REPLACEMENT performed by Ken Pedroza MD at [facility] [place]    TOTAL KNEE ARTHROPLASTY Left 2021    LEFT TOTAL KNEE REPLACEMENT performed by Ken Pedroza MD at [facility] [place]    WRIST SURGERY         Chart Reviewed: Yes  Patient assessed for rehabilitation services?: Yes  Family/Caregiver Present: No  Referring Practitioner: Dr. Bae  Referral Date : 25  Diagnosis: L SADIQ  General  General Comments: pt sitting in chair upon arrival, agreeable to PT    Restrictions:  Restrictions/Precautions: Weight Bearing;Fall Risk;General Precautions  Lower Extremity Weight Bearing Restrictions  Left Lower Extremity Weight Bearing: Weight Bearing As Tolerated  Position Activity Restriction  Hip Precautions: Anterior hip precautions  Other Position/Activity Restrictions: anterior hip precautions, WBAT LLE     SUBJECTIVE  Subjective: Pt reports no

## 2025-07-18 NOTE — CONSULTS
Comprehensive Nutrition Assessment    Type and Reason for Visit:  Initial, Consult    Nutrition Recommendations/Plan:   Continue Regular diet.  Encourage PO intakes.   Monitor pertinent labs, bowel habits, weight, N/V, supplement acceptance, clinical progression.       Malnutrition Assessment:  Malnutrition Status:  At risk for malnutrition (07/18/25 1322)    Context:  Acute Illness     Findings of the 6 clinical characteristics of malnutrition:  Energy Intake:  No decrease in energy intake  Weight Loss:  Unable to assess (question weight history)     Body Fat Loss:  No body fat loss     Muscle Mass Loss:  No muscle mass loss    Fluid Accumulation:  Unable to assess     Strength:  Not Performed    Nutrition Assessment:    Consulted for new ARU admit/ONS. Patient is s/p left total hip arthroplasty 7/15/25. PMHx of CKD stage 3, anxiety, asthma, GERD, HTN, HLD, CARRINGTON, and obesity. Currently on regular diet. Patient seen resting in chair eating lunch. Reports she has \"no issues at all\" with her appetite. PO intakes of % per flowsheets. States she wear dentures, denies need for diet texture modification. Declines need for any Ensure ONS/Magic Cup ice cream, states she is eating well without these. Encouraged PO intakes and protein sources with each meal to help promote healing. Denies any weight weight changes. Question weight history due to weight loss of -16lbs x2 days. Will continue to monitor.    Nutrition Related Findings:    Glucose 117. LBM 7/18. Trace nonpitting edema LLE. Wound Type: Surgical Incision       Current Nutrition Intake & Therapies:    Average Meal Intake: 51-75%, %  Average Supplements Intake: None Ordered  ADULT DIET; Regular    Anthropometric Measures:  Height: 160 cm (5' 3\")  Ideal Body Weight (IBW): 115 lbs (52 kg)       Current Body Weight: 78.8 kg (173 lb 11.6 oz) (7/18/25), 151.1 % IBW. Weight Source: Bed scale  Current BMI (kg/m2): 30.8           Weight Adjustment For: No

## 2025-07-18 NOTE — PLAN OF CARE
ARU PATIENT TREATMENT PLAN  [facility]  [address] [place]  [address]  [phone]    Edna CASS Mcconnell    : 1935  [id number]  MRN: [id number]   PHYSICIAN:  Chelsea Bae MD  Primary Problem    Patient Active Problem List   Diagnosis    CARRINGTON (obstructive sleep apnea)    HTN (hypertension)    Obesity    Displacement of lumbar intervertebral disc without myelopathy    Degeneration of lumbar or lumbosacral intervertebral disc    Spinal stenosis, lumbar region, without neurogenic claudication    Lumbosacral spondylosis without myelopathy    Status post total left knee replacement    Hyperthyroidism    S/P total knee arthroplasty, right    Arthritis of right hip    Arthritis of left hip    Hip fracture requiring operative repair, left, sequela    Closed fracture of neck of left femur (HCC)    Closed left hip fracture, initial encounter ([facility])       Rehabilitation Diagnosis:     Closed left hip fracture, initial encounter ([facility]) [S72.002A]       ADMIT DATE:2025    Patient Goals: \"to get back home and do the things that I was doing before\"     Admitting Impairments: Pt presented to [facility] ER on 7/15 after a fall at home with left hip pain. XR showed impacted left femoral neck fracture. Pt was admitted to [facility] on  d/t functional deficits below her baseline. Decreased functional mobility ; Decreased ADL status; Decreased strength; Decreased safe awareness; Decreased endurance; Decreased balance; Decreased coordination    Barriers: level of assistance, endurance, pain, comorbidities   Participation: \"to get back home and do the things that I was doing before\"   Prognosis: Therapy Prognosis: Good      CARE PLAN     NURSING:  Edna Mcconnell while on this unit will:     [x] Be continent of bowel and bladder     [x] Have an adequate number of bowel movements  [x] Urinate with no urinary retention >300ml in bladder  [] Complete bladder protocol with sandhu

## 2025-07-18 NOTE — PLAN OF CARE
SLP completed evaluation. Please refer to notes in EMR.    Saloni Hawkins MA CF-SLP   Speech Language Pathologist

## 2025-07-18 NOTE — PROGRESS NOTES
Occupational Therapy  Facility/Department: [facility]  Rehabilitation Occupational Therapy Evaluation       Date: 25  Patient Name: Edna Mcconnell       Room: 0158/0158-01  MRN: [id number]  Account: [id number]   : 1935  (89 y.o.) Gender: female     Referring Practitioner: Dr. Bae  Diagnosis: Closed Left Hip Fracture    Pt was bathed during OT session this date. Pt was Showered with soap/water with Maximum Assist.     Restrictions  Restrictions/Precautions: Weight Bearing;Fall Risk;General Precautions  Hip Precautions: Anterior hip precautions  Other Position/Activity Restrictions: anterior hip precautions, WBAT LLE  Left Lower Extremity Weight Bearing: Weight Bearing As Tolerated    Subjective  Subjective: Patient supine in bed upon arrival with no complaints of pain. Does state that pain occurs with movement. /72, HR 77, SpO2 97%.    Objective  Vision - Basic Assessment  Prior Vision: Wears glasses all the time  Visual History: Cataracts  Patient Visual Report: No visual complaint reported.  Vision Adaptations: Cataract surgery  Cognition  Overall Cognitive Status: Exceptions  Arousal/Alertness: Appears intact  Following Commands: Follows multistep commands with increased time;Follows multistep commands with repetition  Attention Span: Attends with cues to redirect  Memory: Decreased short term memory  Safety Judgement: Decreased awareness of need for safety  Problem Solving: Assistance required to generate solutions;Assistance required to identify errors made;Assistance required to correct errors made  Insights: Appears intact  Initiation: Appears intact  Sequencing: Appears intact  Orientation  Overall Orientation Status: Impaired  Orientation Level: Oriented to time;Oriented to situation;Disoriented to place;Disoriented to time (knew the month, but didn't know the year. stated that she was at a nursing home on numerous occasions despite verbal cueing that she was at the hospital.)    Perception  Overall Perceptual Status: WFL  Sensation  Overall Sensation Status: WFL   ROM  LUE AROM (degrees)  LUE AROM : WFL  Left Hand AROM (degrees)  Left Hand AROM: WFL  RUE AROM (degrees)  RUE AROM : WFL  Right Hand AROM (degrees)  Right Hand AROM: WFL  LUE Strength  L Shoulder Flex: 3+/5  L Elbow Flex: 3+/5  L Elbow Ext: 3+/5  L Wrist Flex: 3+/5  L Wrist Ext: 3+/5  L Hand General: 3+/5  RUE Strength  R Shoulder Flex: 3+/5  R Elbow Flex: 3+/5  R Elbow Ext: 3+/5  R Wrist Flex: 3+/5  R Wrist Ext: 3+/5  R Hand General: 3+/5  Fine Motor Skills  Coordination  Movements Are Fluid And Coordinated: Yes   Hand Assessment  Hand Dominance  Hand Dominance: Right  Functional Mobility  Social/Functional History  Lives With: Spouse  Type of Home: House  Home Layout: One level  Home Access: Stairs to enter without rails  Entrance Stairs - Number of Steps: 2  Bathroom Shower/Tub: Walk-in shower  Bathroom Toilet: Handicap height  Bathroom Equipment: Grab bars in shower;Shower chair;Grab bars around toilet  Home Equipment: Walker - Rolling;Cane;Lift chair  Has the patient had two or more falls in the past year or any fall with injury in the past year?: Yes (endorses 2-3 falls in the past year including most recent one which led to hospitalization.)  Prior Level of Assist for ADLs: Independent  Prior Level of Assist for Homemaking: Independent (shares household IADLs with spouse. daugther performs grocery shopping.)  Homemaking Responsibilities: Yes  Prior Level of Assist for Ambulation: Independent household ambulator, with or without device;Independent community ambulator, with or without device (utilizes SPC most of the time. occasionally utilizes RW.)  Prior Level of Assist for Transfers: Independent  Active : No  Patient's  Info: spouse and daughters provide transportion.  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  Leisure & Hobbies:

## 2025-07-19 PROCEDURE — 97530 THERAPEUTIC ACTIVITIES: CPT

## 2025-07-19 PROCEDURE — 97535 SELF CARE MNGMENT TRAINING: CPT

## 2025-07-19 PROCEDURE — 97116 GAIT TRAINING THERAPY: CPT

## 2025-07-19 PROCEDURE — 6370000000 HC RX 637 (ALT 250 FOR IP): Performed by: STUDENT IN AN ORGANIZED HEALTH CARE EDUCATION/TRAINING PROGRAM

## 2025-07-19 PROCEDURE — 97110 THERAPEUTIC EXERCISES: CPT

## 2025-07-19 PROCEDURE — 6360000002 HC RX W HCPCS: Performed by: STUDENT IN AN ORGANIZED HEALTH CARE EDUCATION/TRAINING PROGRAM

## 2025-07-19 PROCEDURE — 92526 ORAL FUNCTION THERAPY: CPT

## 2025-07-19 PROCEDURE — 1280000000 HC REHAB R&B

## 2025-07-19 RX ORDER — METOPROLOL SUCCINATE 25 MG/1
25 TABLET, EXTENDED RELEASE ORAL DAILY
Status: DISCONTINUED | OUTPATIENT
Start: 2025-07-20 | End: 2025-07-21

## 2025-07-19 RX ADMIN — ENOXAPARIN SODIUM 30 MG: 100 INJECTION SUBCUTANEOUS at 20:48

## 2025-07-19 RX ADMIN — DULOXETINE 60 MG: 60 CAPSULE, DELAYED RELEASE ORAL at 08:56

## 2025-07-19 RX ADMIN — PANTOPRAZOLE SODIUM 40 MG: 40 TABLET, DELAYED RELEASE ORAL at 06:52

## 2025-07-19 RX ADMIN — PRAVASTATIN SODIUM 20 MG: 20 TABLET ORAL at 08:56

## 2025-07-19 ASSESSMENT — PAIN SCALES - GENERAL: PAINLEVEL_OUTOF10: 0

## 2025-07-19 NOTE — PROGRESS NOTES
Edna Mcconnell  7/19/2025  [id number]    Chief Complaint: Closed left hip fracture, initial encounter ([facility])    Subjective:   No acute events overnight. Today Alina is seen without family present. She report continued hip pain when she is up moving.     No new labs.     ROS: denies f/c, n/v, cp, sob    Objective:  Patient Vitals for the past 24 hrs:   BP Temp Temp src Pulse Resp SpO2 Weight   07/19/25 0934 (!) 120/45 -- -- (!) 113 -- 97 % --   07/19/25 0845 (!) 106/54 97.7 °F (36.5 °C) Oral (!) 101 16 99 % --   07/19/25 0659 -- -- -- -- -- -- 78.1 kg (172 lb 2.9 oz)   07/18/25 2030 (!) 117/55 97.8 °F (36.6 °C) Oral 78 17 97 % --   07/18/25 1753 -- -- -- -- 16 -- --   07/18/25 1427 -- -- -- -- 16 -- --   07/18/25 1357 -- -- -- -- 16 -- --   07/18/25 1249 (!) 128/55 -- -- (!) 104 -- 96 % --     Gen: No distress, pleasant.   HEENT: Normocephalic, atraumatic.   CV: extremities well perfused  Resp: No respiratory distress.   Abd: Soft, nontender   Ext: No edema.   Neuro: Alert, oriented, appropriately interactive.     Wt Readings from Last 3 Encounters:   07/19/25 78.1 kg (172 lb 2.9 oz)   07/16/25 90.4 kg (199 lb 4.7 oz)   02/02/24 81.6 kg (180 lb)       Laboratory data:   Lab Results   Component Value Date    WBC 10.6 07/18/2025    HGB 9.6 (L) 07/18/2025    HCT 28.6 (L) 07/18/2025    MCV 94.0 07/18/2025     07/18/2025       Lab Results   Component Value Date/Time     07/18/2025 07:00 AM    K 4.1 07/18/2025 07:00 AM     07/18/2025 07:00 AM    CO2 23 07/18/2025 07:00 AM    BUN 22 07/18/2025 07:00 AM    CREATININE 1.1 07/18/2025 07:00 AM    GLUCOSE 117 07/18/2025 07:00 AM    CALCIUM 8.0 07/18/2025 07:00 AM        Therapy progress:  Physical therapy:  Bed Mobility:     Sit>supine:     Supine>sit:     Transfers:     Sit>stand:     Stand>sit:     Bed<>chair     Stand Pivot:     Lateral transfer:     Car transfer:     Ambulation:     Stairs:     Curb:     Wheelchair:       Occupational therapy:

## 2025-07-19 NOTE — PROGRESS NOTES
Pt alert, oriented to self/situation, very forgetful. BP low, pt asymptomatic BP meds held this morning. Assessment completed and documented. Left hip incision C/D/I. Incentive Spirometer within reach, patient used with nursing assistance/instruction. Denies any pain. Pt up to chair, alarm in place, call light within reach. Omid any needs at this time, will continue to monitor.

## 2025-07-19 NOTE — PLAN OF CARE
Problem: Discharge Planning  Goal: Discharge to home or other facility with appropriate resources  Outcome: Progressing     Problem: Pain  Goal: Verbalizes/displays adequate comfort level or baseline comfort level  Outcome: Progressing   Pt verbalizes how to use pain scale. Denies any pain currently. Instructed to call out if pain arises.     Problem: Skin/Tissue Integrity  Goal: Skin integrity remains intact  Description: 1.  Monitor for areas of redness and/or skin breakdown  2.  Assess vascular access sites hourly  3.  Every 4-6 hours minimum:  Change oxygen saturation probe site  4.  Every 4-6 hours:  If on nasal continuous positive airway pressure, respiratory therapy assess nares and determine need for appliance change or resting period  Outcome: Progressing       Problem: Safety - Adult  Goal: Free from fall injury  7/19/2025 1113 by Tonia Pineda, RN  Outcome: Progressing  7/19/2025 0003 by Anusha Canales RN  Outcome: Progressing   Bed/Chair alarm in place. Personal belongings and call light within reach.     Problem: ABCDS Injury Assessment  Goal: Absence of physical injury  Outcome: Progressing     Problem: Nutrition Deficit:  Goal: Optimize nutritional status  Outcome: Progressing

## 2025-07-19 NOTE — PROGRESS NOTES
Physical Therapy  Facility/Department: [facility]  Rehabilitation Physical Therapy Treatment Note    NAME: Edna Mcconnell  : 1935 (89 y.o.)  MRN: [id number]  CODE STATUS: Full Code    Date of Service: 25       Restrictions:  Restrictions/Precautions: Weight Bearing, Fall Risk, General Precautions  Lower Extremity Weight Bearing Restrictions  Left Lower Extremity Weight Bearing: Weight Bearing As Tolerated  Position Activity Restriction  Hip Precautions: Anterior hip precautions  Other Position/Activity Restrictions: anterior hip precautions, WBAT LLE     SUBJECTIVE  Subjective: Pt in recliner, agreeable to therapy, 2-3/10 LLE        OBJECTIVE  111/61, 99%, 77 bpm    Cognition  Overall Cognitive Status: Exceptions  Arousal/Alertness: Appears intact  Following Commands: Follows multistep commands with increased time;Follows multistep commands with repetition  Attention Span: Attends with cues to redirect  Memory: Decreased short term memory  Safety Judgement: Decreased awareness of need for safety  Problem Solving: Assistance required to generate solutions;Assistance required to identify errors made;Assistance required to correct errors made  Insights: Appears intact  Initiation: Appears intact  Sequencing: Appears intact  Orientation  Overall Orientation Status: Impaired  Orientation Level: Oriented to time;Oriented to situation;Disoriented to place;Disoriented to time    Functional Mobility  Sit to Stand  Assistance Level: Minimal assistance;Contact guard assist  Skilled Clinical Factors: CGA with destiny stedy from toilet, min A with RW  Stand to Sit  Assistance Level: Contact guard assist  Skilled Clinical Factors: with RW and destiny stedy  Bed To/From Chair  Technique: Stand step  Assistance Level: Minimal assistance;Dependent  Skilled Clinical Factors: 5 ft from w/c>recliner with RW at end of session, cues for turning with YNES within RW all the way to chair prior to sitting. Destiny stedy toilet>w/c

## 2025-07-19 NOTE — PROGRESS NOTES
Occupational Therapy  Facility/Department: [facility]  Rehabilitation Occupational Therapy Daily Treatment Note    Date: 25  Patient Name: Edna Mcconnell       Room: 0158/0158-01  MRN: [id number]  Account: [id number]   : 1935  (89 y.o.) Gender: female                  Past Medical History:  has a past medical history of Allergic rhinitis, Anxiety, Arthritis, Asthma, Back pain, GERD (gastroesophageal reflux disease), Goiter, Hyperlipidemia, Hypertension, MRSA (methicillin resistant staph aureus) culture positive, CARRINGTON (obstructive sleep apnea), Rash, and Thyroid disease.  Past Surgical History:   has a past surgical history that includes Diagnostic Cardiac Cath Lab Procedure; Hysterectomy; Cholecystectomy; Rotator cuff repair; knee surgery; Breast surgery; Wrist surgery; Cataract removal; lumbar fusion (2018); eye surgery; Dilation and curettage of uterus; Total knee arthroplasty (Right, 2021); Total knee arthroplasty (Left, 2021); and Total hip arthroplasty (Left, 7/15/2025).    Restrictions  Restrictions/Precautions: Weight Bearing, Fall Risk, General Precautions  Hip Precautions: Anterior hip precautions  Other Position/Activity Restrictions: anterior hip precautions, WBAT LLE  Left Lower Extremity Weight Bearing: Weight Bearing As Tolerated    Subjective  Subjective: pt seen for OT treament, reports she is very sore today in L hip 3/10 aching  Restrictions/Precautions: Weight Bearing;Fall Risk;General Precautions           Vitals:    25 0934   BP: (!) 120/45   Pulse: (!) 113   Resp:    Temp:    SpO2: 97%       Objective     Cognition  Cognition Comment: pt reports she is unable to recall any of her hip precautions \"no ones told me anything\" reeducated on all hip and WB px       ADL  Lower Extremity Dressing  Equipment Provided: Reachers  Assistance Level: Moderate assistance  Skilled Clinical Factors: x2 trials  Putting On/Taking Off Footwear  Equipment Provided: Sock

## 2025-07-19 NOTE — PROGRESS NOTES
[facility]: ACUTE REHAB UNIT  SPEECH-LANGUAGE PATHOLOGY      [x] Daily  [] Weekly Care Conference Note  [] Discharge    Patient:Edna Mcconnell      :1935  MRN:[id number]  Rehab Dx/Hx: Closed left hip fracture, initial encounter ([facility]) [S72.002A]    Precautions: falls and aspirations  Home situation: Lives with spoise  ST Dx: [] Aphasia  [] Dysarthria  [] Apraxia   [x] Oropharyngeal dysphagia [] Cognitive Impairment  [] Other:   Date of Admit: 2025  Room #: 0158/0158-01    Current functional status (updated daily):         Pt being seen for : [] Speech/Language Treatment  [x] Dysphagia Treatment [] Cognitive Treatment  [] Other:  Communication: [x]WFL  [] Aphasia  [] Dysarthria  [] Apraxia  [] Pragmatic Impairment [] Non-verbal  [] Hearing Loss  [] Other:   Cognition: [x] WFL  [] Mild  [] Moderate  [] Severe [] Unable to Assess  [] Other:  Memory: [x] WFL  [] Mild  [] Moderate  [] Severe [] Unable to Assess  [] Other:  Behavior: [x] Alert  [x] Cooperative  [x]  Pleasant  [] Confused  [] Agitated  [] Uncooperative  [] Distractible [] Motivated  [] Self-Limiting [] Anxious  [] Other:  Endurance:  [x] Adequate for participation in SLP sessions  [] Reduced overall  [] Lethargic  [] Other:  Safety: [x] No concerns at this time  [] Reduced insight into deficits  []  Reduced safety awareness [] Not following call light procedures  [] Unable to Assess  [] Other:    Current Diet Order:ADULT DIET; Regular  Swallowing Precautions: upright for all intake, stay upright for at least 30 mins after intake, small bites/sips, distant supervision with intake, oral care 2-3x/day to reduce adverse affects in the event of aspiration, increase physical mobility as able, alternate bites/sips, slow rate of intake, STRICT aspiration precautions, and hold PO and contact SLP if s/s of aspiration or worsening respiratory status develop.         Date: 2025      Tx session 1  [id number] Tx session 2  All needs met in session 1   Total

## 2025-07-20 PROCEDURE — 1280000000 HC REHAB R&B

## 2025-07-20 PROCEDURE — 6360000002 HC RX W HCPCS: Performed by: STUDENT IN AN ORGANIZED HEALTH CARE EDUCATION/TRAINING PROGRAM

## 2025-07-20 PROCEDURE — 6370000000 HC RX 637 (ALT 250 FOR IP): Performed by: STUDENT IN AN ORGANIZED HEALTH CARE EDUCATION/TRAINING PROGRAM

## 2025-07-20 RX ADMIN — METOPROLOL SUCCINATE 25 MG: 25 TABLET, EXTENDED RELEASE ORAL at 10:46

## 2025-07-20 RX ADMIN — DULOXETINE 60 MG: 60 CAPSULE, DELAYED RELEASE ORAL at 10:46

## 2025-07-20 RX ADMIN — ENOXAPARIN SODIUM 30 MG: 100 INJECTION SUBCUTANEOUS at 21:08

## 2025-07-20 RX ADMIN — HYDROCODONE BITARTRATE AND ACETAMINOPHEN 1 TABLET: 5; 325 TABLET ORAL at 18:10

## 2025-07-20 RX ADMIN — FUROSEMIDE 20 MG: 20 TABLET ORAL at 10:46

## 2025-07-20 RX ADMIN — PANTOPRAZOLE SODIUM 40 MG: 40 TABLET, DELAYED RELEASE ORAL at 06:37

## 2025-07-20 RX ADMIN — PRAVASTATIN SODIUM 20 MG: 20 TABLET ORAL at 10:46

## 2025-07-20 ASSESSMENT — PAIN DESCRIPTION - PAIN TYPE: TYPE: SURGICAL PAIN

## 2025-07-20 ASSESSMENT — PAIN DESCRIPTION - DESCRIPTORS: DESCRIPTORS: ACHING;DISCOMFORT

## 2025-07-20 ASSESSMENT — PAIN DESCRIPTION - ONSET: ONSET: ON-GOING

## 2025-07-20 ASSESSMENT — PAIN - FUNCTIONAL ASSESSMENT: PAIN_FUNCTIONAL_ASSESSMENT: ACTIVITIES ARE NOT PREVENTED

## 2025-07-20 ASSESSMENT — PAIN SCALES - GENERAL
PAINLEVEL_OUTOF10: 0
PAINLEVEL_OUTOF10: 5

## 2025-07-20 ASSESSMENT — PAIN DESCRIPTION - ORIENTATION: ORIENTATION: LEFT

## 2025-07-20 ASSESSMENT — PAIN DESCRIPTION - FREQUENCY: FREQUENCY: INTERMITTENT

## 2025-07-20 ASSESSMENT — PAIN DESCRIPTION - LOCATION: LOCATION: LEG

## 2025-07-20 NOTE — PLAN OF CARE
Problem: Discharge Planning  Goal: Discharge to home or other facility with appropriate resources  7/19/2025 2252 by Darshana Jama RN  Outcome: Progressing  7/19/2025 1113 by Tonia Pineda RN  Outcome: Progressing     Problem: Pain  Goal: Verbalizes/displays adequate comfort level or baseline comfort level  7/19/2025 2252 by Darshana Jama RN  Outcome: Progressing  7/19/2025 1113 by Tonia Pineda RN  Outcome: Progressing     Problem: Skin/Tissue Integrity  Goal: Skin integrity remains intact  Description: 1.  Monitor for areas of redness and/or skin breakdown  2.  Assess vascular access sites hourly  3.  Every 4-6 hours minimum:  Change oxygen saturation probe site  4.  Every 4-6 hours:  If on nasal continuous positive airway pressure, respiratory therapy assess nares and determine need for appliance change or resting period  7/19/2025 2252 by Darshana Jama RN  Outcome: Progressing  7/19/2025 1113 by Tonia Pineda RN  Outcome: Progressing     Problem: Safety - Adult  Goal: Free from fall injury  7/19/2025 2252 by Darshana Jama RN  Outcome: Progressing  7/19/2025 1113 by Tonia Pineda RN  Outcome: Progressing     Problem: ABCDS Injury Assessment  Goal: Absence of physical injury  7/19/2025 2252 by Darshana Jama RN  Outcome: Progressing  7/19/2025 1113 by Tonia Pineda RN  Outcome: Progressing     Problem: Nutrition Deficit:  Goal: Optimize nutritional status  7/19/2025 2252 by Darshana Jama RN  Outcome: Progressing  7/19/2025 1113 by Tonia Pineda RN  Outcome: Progressing

## 2025-07-21 LAB
ANION GAP SERPL CALCULATED.3IONS-SCNC: 11 MMOL/L (ref 3–16)
BASOPHILS # BLD: 0.1 K/UL (ref 0–0.2)
BASOPHILS NFR BLD: 0.7 %
BUN SERPL-MCNC: 16 MG/DL (ref 7–20)
CALCIUM SERPL-MCNC: 8.5 MG/DL (ref 8.3–10.6)
CHLORIDE SERPL-SCNC: 103 MMOL/L (ref 99–110)
CO2 SERPL-SCNC: 25 MMOL/L (ref 21–32)
CREAT SERPL-MCNC: 1 MG/DL (ref 0.6–1.2)
DEPRECATED RDW RBC AUTO: 13.7 % (ref 12.4–15.4)
EOSINOPHIL # BLD: 0.6 K/UL (ref 0–0.6)
EOSINOPHIL NFR BLD: 6.1 %
GFR SERPLBLD CREATININE-BSD FMLA CKD-EPI: 54 ML/MIN/{1.73_M2}
GLUCOSE SERPL-MCNC: 106 MG/DL (ref 70–99)
HCT VFR BLD AUTO: 29.4 % (ref 36–48)
HGB BLD-MCNC: 9.9 G/DL (ref 12–16)
LYMPHOCYTES # BLD: 2 K/UL (ref 1–5.1)
LYMPHOCYTES NFR BLD: 21 %
MCH RBC QN AUTO: 31.8 PG (ref 26–34)
MCHC RBC AUTO-ENTMCNC: 33.7 G/DL (ref 31–36)
MCV RBC AUTO: 94.5 FL (ref 80–100)
MONOCYTES # BLD: 0.8 K/UL (ref 0–1.3)
MONOCYTES NFR BLD: 8.5 %
NEUTROPHILS # BLD: 5.9 K/UL (ref 1.7–7.7)
NEUTROPHILS NFR BLD: 63.7 %
PLATELET # BLD AUTO: 308 K/UL (ref 135–450)
PMV BLD AUTO: 8.1 FL (ref 5–10.5)
POTASSIUM SERPL-SCNC: 3.8 MMOL/L (ref 3.5–5.1)
RBC # BLD AUTO: 3.11 M/UL (ref 4–5.2)
SODIUM SERPL-SCNC: 139 MMOL/L (ref 136–145)
WBC # BLD AUTO: 9.3 K/UL (ref 4–11)

## 2025-07-21 PROCEDURE — 6360000002 HC RX W HCPCS: Performed by: STUDENT IN AN ORGANIZED HEALTH CARE EDUCATION/TRAINING PROGRAM

## 2025-07-21 PROCEDURE — 1280000000 HC REHAB R&B

## 2025-07-21 PROCEDURE — 97116 GAIT TRAINING THERAPY: CPT

## 2025-07-21 PROCEDURE — 97110 THERAPEUTIC EXERCISES: CPT

## 2025-07-21 PROCEDURE — 36415 COLL VENOUS BLD VENIPUNCTURE: CPT

## 2025-07-21 PROCEDURE — 97535 SELF CARE MNGMENT TRAINING: CPT

## 2025-07-21 PROCEDURE — 6370000000 HC RX 637 (ALT 250 FOR IP): Performed by: STUDENT IN AN ORGANIZED HEALTH CARE EDUCATION/TRAINING PROGRAM

## 2025-07-21 PROCEDURE — 92526 ORAL FUNCTION THERAPY: CPT

## 2025-07-21 PROCEDURE — 80048 BASIC METABOLIC PNL TOTAL CA: CPT

## 2025-07-21 PROCEDURE — 97530 THERAPEUTIC ACTIVITIES: CPT

## 2025-07-21 PROCEDURE — 85025 COMPLETE CBC W/AUTO DIFF WBC: CPT

## 2025-07-21 RX ORDER — METOPROLOL SUCCINATE 25 MG/1
12.5 TABLET, EXTENDED RELEASE ORAL DAILY
Status: DISCONTINUED | OUTPATIENT
Start: 2025-07-22 | End: 2025-08-02 | Stop reason: HOSPADM

## 2025-07-21 RX ADMIN — PRAVASTATIN SODIUM 20 MG: 20 TABLET ORAL at 08:26

## 2025-07-21 RX ADMIN — METOPROLOL SUCCINATE 25 MG: 25 TABLET, EXTENDED RELEASE ORAL at 08:25

## 2025-07-21 RX ADMIN — PANTOPRAZOLE SODIUM 40 MG: 40 TABLET, DELAYED RELEASE ORAL at 06:47

## 2025-07-21 RX ADMIN — Medication 3 MG: at 22:23

## 2025-07-21 RX ADMIN — ENOXAPARIN SODIUM 30 MG: 100 INJECTION SUBCUTANEOUS at 20:45

## 2025-07-21 RX ADMIN — DULOXETINE 60 MG: 60 CAPSULE, DELAYED RELEASE ORAL at 08:25

## 2025-07-21 RX ADMIN — FUROSEMIDE 20 MG: 20 TABLET ORAL at 08:26

## 2025-07-21 RX ADMIN — HYDROCODONE BITARTRATE AND ACETAMINOPHEN 1 TABLET: 5; 325 TABLET ORAL at 13:08

## 2025-07-21 RX ADMIN — HYDROCODONE BITARTRATE AND ACETAMINOPHEN 1 TABLET: 5; 325 TABLET ORAL at 06:47

## 2025-07-21 RX ADMIN — HYDROCODONE BITARTRATE AND ACETAMINOPHEN 1 TABLET: 5; 325 TABLET ORAL at 18:02

## 2025-07-21 ASSESSMENT — PAIN DESCRIPTION - DESCRIPTORS
DESCRIPTORS: ACHING
DESCRIPTORS: SORE
DESCRIPTORS: ACHING
DESCRIPTORS: ACHING

## 2025-07-21 ASSESSMENT — PAIN SCALES - GENERAL
PAINLEVEL_OUTOF10: 2
PAINLEVEL_OUTOF10: 2
PAINLEVEL_OUTOF10: 5
PAINLEVEL_OUTOF10: 0
PAINLEVEL_OUTOF10: 0
PAINLEVEL_OUTOF10: 2
PAINLEVEL_OUTOF10: 5
PAINLEVEL_OUTOF10: 5

## 2025-07-21 ASSESSMENT — PAIN DESCRIPTION - LOCATION
LOCATION: HIP

## 2025-07-21 ASSESSMENT — PAIN DESCRIPTION - ORIENTATION
ORIENTATION: LEFT

## 2025-07-21 ASSESSMENT — PAIN DESCRIPTION - ONSET: ONSET: ON-GOING

## 2025-07-21 ASSESSMENT — PAIN DESCRIPTION - PAIN TYPE: TYPE: SURGICAL PAIN

## 2025-07-21 ASSESSMENT — PAIN DESCRIPTION - FREQUENCY: FREQUENCY: INTERMITTENT

## 2025-07-21 ASSESSMENT — PAIN - FUNCTIONAL ASSESSMENT: PAIN_FUNCTIONAL_ASSESSMENT: ACTIVITIES ARE NOT PREVENTED

## 2025-07-21 NOTE — PROGRESS NOTES
Pt awake in room with family at bedside. Pt alert and oriented. Gets up with x 1 stedy. VSS, on room air. Denies any needs. Pt is calm and cooperative. Left hip incision is dry and intact. Tolerates a regular diet. Swallows pills whole. Assessment completed. Answered all questions. Instructed to continue calling out for needs. All safety measures in place. Call light in reach, will continue to  monitor.

## 2025-07-21 NOTE — PROGRESS NOTES
Occupational Therapy  Facility/Department: [facility]  Rehabilitation Occupational Therapy Daily Treatment Note    Date: 25  Patient Name: Edna Mcconnell       Room: 0158/0158-01  MRN: [id number]  Account: [id number]   : 1935  (89 y.o.) Gender: female        Past Medical History:  has a past medical history of Allergic rhinitis, Anxiety, Arthritis, Asthma, Back pain, GERD (gastroesophageal reflux disease), Goiter, Hyperlipidemia, Hypertension, MRSA (methicillin resistant staph aureus) culture positive, CARRINGTON (obstructive sleep apnea), Rash, and Thyroid disease.  Past Surgical History:   has a past surgical history that includes Diagnostic Cardiac Cath Lab Procedure; Hysterectomy; Cholecystectomy; Rotator cuff repair; knee surgery; Breast surgery; Wrist surgery; Cataract removal; lumbar fusion (2018); eye surgery; Dilation and curettage of uterus; Total knee arthroplasty (Right, 2021); Total knee arthroplasty (Left, 2021); and Total hip arthroplasty (Left, 7/15/2025).    Restrictions  Restrictions/Precautions: Weight Bearing, Fall Risk, General Precautions  Hip Precautions: Anterior hip precautions  Other Position/Activity Restrictions: anterior hip precautions, WBAT LLE  Left Lower Extremity Weight Bearing: Weight Bearing As Tolerated    Subjective  Subjective: Pt seen for OT session resting in bed, agreeable to session. /60, 96% O2 on RA, 94 pulse     Objective   ADL  Upper Extremity Dressing  Assistance Level: Set-up;Increased time to complete  Skilled Clinical Factors: setup with shirt; maxA with front clasping sports bra d/t new piece of clothing and not familiar with it  Lower Extremity Dressing  Assistance Level: Moderate assistance;Increased time to complete;Set-up;Verbal cues  Skilled Clinical Factors: modA brief and pants seated on toilet w/ verbal cues for dressing technique  Toileting  Assistance Level: Maximum assistance;Increased time to complete  Skilled Clinical

## 2025-07-21 NOTE — PROGRESS NOTES
Physical Therapy  Facility/Department: [facility]  Rehabilitation Physical Therapy Treatment Note    NAME: Edna Mcconnell  : 1935 (89 y.o.)  MRN: [id number]  CODE STATUS: Full Code    Date of Service: 25       Restrictions:  Restrictions/Precautions: Weight Bearing, Fall Risk, General Precautions  Lower Extremity Weight Bearing Restrictions  Left Lower Extremity Weight Bearing: Weight Bearing As Tolerated  Position Activity Restriction  Hip Precautions: Anterior hip precautions  Other Position/Activity Restrictions: Anterior hip precautions     SUBJECTIVE  Subjective: Pt agreeable to work with PT this session, pleasant and cooperative.  Pain: Pt with mild L hip soreness at rest and with movement (did not rate on 0-10 scale).    OBJECTIVE  Overall Orientation Status: Impaired  Orientation Level: Oriented to person;Disoriented to time;Oriented to place;Oriented to situation (pt unable to name correct month or year when asked)    Bed Mobility  Sit to Supine  Assistance Level: Minimal assistance  Skilled Clinical Factors: To guide LLE into bed, HOB at 0 degrees, increased time/effort  Supine to Sit: PATRICIA - pt up in chair upon arrival of PT  Scooting  Assistance Level: Moderate assistance;Requires x 2 assistance  Skilled Clinical Factors: ModA x 1 to minimally scoot upward in bed, modA x 2 to fully scoot up in bed    Balance  Sitting Balance: Supervision  Standing Balance: Contact guard assistance (using RW)    Transfers  Additional Factors: Set-up;Verbal cues;Increased time to complete  Device: Walker  Sit to Stand  Assistance Level: Minimal assistance;Contact guard assist  Skilled Clinical Factors: Noble x 1 on first attempt, CGA x 1 on second attempt (both from recliner chair), increased time/effort  Stand to Sit  Assistance Level: Contact guard assist  Bed To/From Chair  Technique: Stand step  Assistance Level: Contact guard assist  Skilled Clinical Factors: Using RW, increased time/effort, moving from  chair>bed toward R side    Ambulation  Surface: Level surface  Device: Rolling walker  Distance: 35 feet  Activity: Within Room;Within Unit  Additional Factors: Verbal cues;Increased time to complete  Assistance Level: Contact guard assist  Gait Deviations: Decreased step length bilateral;Slow guillaume;Wide base of support;Decreased weight shift left;Decreased trunk rotation  Skilled Clinical Factors:   Very slow pace, shortened stride length B, increased B stance time   Distance limited by increasing c/o weakness and dizziness   BP found to be very low immediately post-amb -- see vitals section for details    Exercises: Deferred this afternoon due to drop in BP after amb and significant c/o fatigue/weakness and dizziness after amb.      ASSESSMENT/PROGRESS TOWARDS GOALS  Vital Signs  Pulse: 79  Heart Rate Source: Monitor  BP: (!) 87/54 (immediately after amb)  BP Location: Right upper arm  MAP (Calculated): 65  SpO2: 96 %  O2 Device: None (Room air)  Comment: Pt with c/o dizziness/lightheadedness while amb (BP 87/54).  After lying supine for ~3-4 minutes, /80.  Pt remarks feeling better and less dizzy after lying in bed.    Assessment  Assessment: Pt participated fairly well when seen for PT follow-up session this date.  Pt progressing well, ambulating increased distance today compared to previous date (35 feet) with CGA x 1 and support of RW.  Pt performing sit<>stand transfers with Noble x 1 decreasing to CGA with repetition.  Pt demonstrating increased effort with functional activities with amb distance limited by weakness and dizziness today.  BP dropped to 80s/50s immediately post-amb, rebounding to WNL after rest x several minutes in supine (RN aware).  Pt still limited in overall strength and level of (I) with mobility compared to baseline LOF and would benefit from continued skilled PT in acute rehab setting.  Activity Tolerance: Patient limited by fatigue;Patient limited by endurance;Other (comment)

## 2025-07-21 NOTE — PLAN OF CARE
Problem: Discharge Planning  Goal: Discharge to home or other facility with appropriate resources  Outcome: Progressing     Problem: Pain  Goal: Verbalizes/displays adequate comfort level or baseline comfort level  Outcome: Progressing     Problem: Skin/Tissue Integrity  Goal: Skin integrity remains intact  Description: 1.  Monitor for areas of redness and/or skin breakdown  2.  Assess vascular access sites hourly  3.  Every 4-6 hours minimum:  Change oxygen saturation probe site  4.  Every 4-6 hours:  If on nasal continuous positive airway pressure, respiratory therapy assess nares and determine need for appliance change or resting period  Outcome: Progressing     Problem: Safety - Adult  Goal: Free from fall injury  Outcome: Progressing     Problem: ABCDS Injury Assessment  Goal: Absence of physical injury  Outcome: Progressing     Problem: Nutrition Deficit:  Goal: Optimize nutritional status  Outcome: Progressing

## 2025-07-21 NOTE — PROGRESS NOTES
Edna Mcconnell  7/21/2025  [id number]    Chief Complaint: Closed left hip fracture, initial encounter ([facility])    Subjective:   No acute events overnight. Today Alina is seen in her room without family present. She reports that therapy went well on Saturday. She reports that her last bowel movement was yesterday.     Personally reviewed labs.     ROS: denies f/c, n/v, cp, sob    Objective:  Patient Vitals for the past 24 hrs:   BP Temp Temp src Pulse Resp SpO2 Weight   07/21/25 1117 (!) 87/54 -- -- 79 -- 96 % --   07/21/25 1045 -- -- -- -- -- -- 79.2 kg (174 lb 8 oz)   07/21/25 0815 130/60 98.1 °F (36.7 °C) Oral 94 17 96 % --   07/21/25 0717 -- -- -- -- 17 -- --   07/21/25 0647 -- -- -- -- 16 -- --   07/21/25 0500 -- -- -- -- -- -- 74.3 kg (163 lb 12.8 oz)   07/20/25 1949 123/65 98.6 °F (37 °C) Axillary 94 16 98 % --   07/20/25 1810 -- -- -- -- 17 -- --     Gen: No distress, pleasant.   HEENT: Normocephalic, atraumatic.   CV: extremities well perfused  Resp: No respiratory distress.   Abd: Soft, nontender   Ext: No edema.   Neuro: Alert, oriented, appropriately interactive. Speech fluent    Wt Readings from Last 3 Encounters:   07/21/25 79.2 kg (174 lb 8 oz)   07/16/25 90.4 kg (199 lb 4.7 oz)   02/02/24 81.6 kg (180 lb)       Laboratory data:   Lab Results   Component Value Date    WBC 9.3 07/21/2025    HGB 9.9 (L) 07/21/2025    HCT 29.4 (L) 07/21/2025    MCV 94.5 07/21/2025     07/21/2025       Lab Results   Component Value Date/Time     07/21/2025 06:40 AM    K 3.8 07/21/2025 06:40 AM     07/21/2025 06:40 AM    CO2 25 07/21/2025 06:40 AM    BUN 16 07/21/2025 06:40 AM    CREATININE 1.0 07/21/2025 06:40 AM    GLUCOSE 106 07/21/2025 06:40 AM    CALCIUM 8.5 07/21/2025 06:40 AM        Therapy progress:  Physical therapy:  Bed Mobility:     Sit>supine:     Supine>sit:     Transfers:     Sit>stand:  Assistance Level: Minimal assistance, Contact guard assist  Skilled Clinical Factors: CGA with jami

## 2025-07-21 NOTE — PROGRESS NOTES
Patient sitting up in chair during hourly rounding. Alert and oriented x4, denies any pain or discomfort at this time. Call light and personal belongings within reach. Fall precautions remain in place. Instructed patient to notify RN of any needs via call light, patient verbalized understanding.

## 2025-07-21 NOTE — PROGRESS NOTES
[facility]: ACUTE REHAB UNIT  SPEECH-LANGUAGE PATHOLOGY      [x] Daily  [] Weekly Care Conference Note  [] Discharge    Patient:Edna Mcconnell      :1935  MRN:[id number]  Rehab Dx/Hx: Closed left hip fracture, initial encounter ([facility]) [S72.002A]    Precautions: falls and aspirations  Home situation: Lives with spouse  ST Dx: [] Aphasia  [] Dysarthria  [] Apraxia   [x] Oropharyngeal dysphagia [] Cognitive Impairment  [] Other:   Date of Admit: 2025  Room #: 0158/0158-01    Current functional status (updated daily):         Pt being seen for : [] Speech/Language Treatment  [x] Dysphagia Treatment [] Cognitive Treatment  [] Other:  Communication: [x]WFL  [] Aphasia  [] Dysarthria  [] Apraxia  [] Pragmatic Impairment [] Non-verbal  [] Hearing Loss  [] Other:   Cognition: [x] WFL  [] Mild  [] Moderate  [] Severe [] Unable to Assess  [] Other:  Memory: [x] WFL  [] Mild  [] Moderate  [] Severe [] Unable to Assess  [] Other:  Behavior: [x] Alert  [x] Cooperative  [x]  Pleasant  [] Confused  [] Agitated  [] Uncooperative  [] Distractible [] Motivated  [] Self-Limiting [] Anxious  [] Other:  Endurance:  [x] Adequate for participation in SLP sessions  [] Reduced overall  [] Lethargic  [] Other:  Safety: [x] No concerns at this time  [] Reduced insight into deficits  []  Reduced safety awareness [] Not following call light procedures  [] Unable to Assess  [] Other:    Current Diet Order:ADULT DIET; Regular  Swallowing Precautions: upright for all intake, stay upright for at least 30 mins after intake, small bites/sips, distant supervision with intake, oral care 2-3x/day to reduce adverse affects in the event of aspiration, increase physical mobility as able, alternate bites/sips, slow rate of intake, STRICT aspiration precautions, and hold PO and contact SLP if s/s of aspiration or worsening respiratory status develop.         Date: 2025      Tx session 1  0830 - 0900 Tx session 2  All needs met in session 1   Total

## 2025-07-21 NOTE — PLAN OF CARE
Problem: Discharge Planning  Goal: Discharge to home or other facility with appropriate resources  Outcome: Progressing     Problem: Pain  Goal: Verbalizes/displays adequate comfort level or baseline comfort level  Outcome: Progressing     Problem: Skin/Tissue Integrity  Goal: Skin integrity remains intact  Description: 1.  Monitor for areas of redness and/or skin breakdown  2.  Assess vascular access sites hourly  3.  Every 4-6 hours minimum:  Change oxygen saturation probe site  4.  Every 4-6 hours:  If on nasal continuous positive airway pressure, respiratory therapy assess nares and determine need for appliance change or resting period  Outcome: Progressing  Flowsheets (Taken 7/21/2025 1710)  Skin Integrity Remains Intact: Monitor for areas of redness and/or skin breakdown     Problem: Safety - Adult  Goal: Free from fall injury  Outcome: Progressing     Problem: ABCDS Injury Assessment  Goal: Absence of physical injury  Outcome: Progressing  Flowsheets (Taken 7/21/2025 1710)  Absence of Physical Injury: Implement safety measures based on patient assessment     Problem: Nutrition Deficit:  Goal: Optimize nutritional status  Outcome: Progressing     Continue with plan of care.

## 2025-07-21 NOTE — CARE COORDINATION
CM update: [place]  [place]ival in a ranch-style house w/ 2STE. Patient's goal is to discharge back home -Patient and daughter report that  is limited to provide physical support. Patient has two daughter's (both RN's) who are a strong support for patient but may not be able to provide 24hr support/supervision if recommended. Daughter states they may need to consider SNF at discharge if patient's physical needs require more support than  would be able to manage on his own. Patient is not an active  and reports that she depends on her  and daughter for all transportation needs. Patient prefers HHC over OP therapy and has used [facility] in the past. Patient reports that she currently owns a RW, cane, and lift chair- will continue to follow DME recs and provide at discharge as appropriate. Will plan to update patient and daughter with all discharge recommendations after Care conference meeting tomorrow. COA referral made per daughter request.    Suzanne Miner RN

## 2025-07-21 NOTE — PROGRESS NOTES
Physical Therapy  Facility/Department: [facility]  Rehabilitation Physical Therapy Treatment Note    NAME: Edna Mcconnell  : 1935 (89 y.o.)  MRN: [id number]  CODE STATUS: Full Code    Date of Service: 25       Restrictions:  Restrictions/Precautions: Weight Bearing, Fall Risk, General Precautions  Lower Extremity Weight Bearing Restrictions  Left Lower Extremity Weight Bearing: Weight Bearing As Tolerated  Position Activity Restriction  Hip Precautions: Anterior hip precautions  Other Position/Activity Restrictions: Anterior hip precautions     SUBJECTIVE  Subjective: Pt in recliner, agreeable to therapy  Pain: Mild L hip soreness       OBJECTIVE  Orientation  Overall Orientation Status: Impaired  Orientation Level: Oriented to person;Disoriented to time;Oriented to place;Oriented to situation (pt unable to name correct month or year when asked)    Functional Mobility  Supine to Sit  Assistance Level: Minimal assistance  Skilled Clinical Factors: for LLE      Environmental Mobility  Ambulation  Distance: 14' + 16' + 14' + 18' + 14'  Activity: Within Room;Within Unit  Activity Comments: Pt ambulates with slow guillaume, short step length, downward gaze, forward flexed.  Stairs  Stair Height: 6''  Device: Bilateral handrails  Number of Stairs: 2  Additional Factors: Verbal cues;Non-reciprocal going down;Non-reciprocal going up;Hand placement cues  Assistance Level: Contact guard assist             PT Exercises  Exercise Treatment: seated 2# RLE/0# LLE 2x10: heel raises, LAQ, marching, hip adduction via ball squeeze      ASSESSMENT/PROGRESS TOWARDS GOALS   /60, HR 94, SpO2 96% on room air     Patient seen for skilled PT services focusing on functional mobility, LE strengthening, gait and stair training. Patient able to ambulate up to 18ft with RW, though wheelchair brought behind d/t fatigue or pain. Frequently ambulates with forward flexed posture, cues to remain within walker frame especially during  Care;Precautions;Safety;ADL Function;Equipment;Transfer Training;DME/Home Modifications  Education Provided Comments: progressive mobility, safe gait and step sequencing  Education Method: Demonstration;Verbal  Barriers to Learning: None  Education Outcome: Verbalized understanding;Continued education needed        Therapy Time   Individual Concurrent Group Co-treatment   Time In 1100         Time Out 1130         Minutes 30           Timed Code Treatment Minutes: 30 Minutes     Second Session Therapy Time:   Individual Concurrent Group Co-treatment   Time In 1230         Time Out 1300         Minutes 30           Timed Code Treatment Minutes:  30    Total Treatment Minutes:  60    Vandana Quevedo, PT, 07/21/25 at 3:37 PM

## 2025-07-22 PROCEDURE — 97530 THERAPEUTIC ACTIVITIES: CPT

## 2025-07-22 PROCEDURE — 97110 THERAPEUTIC EXERCISES: CPT

## 2025-07-22 PROCEDURE — 6370000000 HC RX 637 (ALT 250 FOR IP): Performed by: STUDENT IN AN ORGANIZED HEALTH CARE EDUCATION/TRAINING PROGRAM

## 2025-07-22 PROCEDURE — 97535 SELF CARE MNGMENT TRAINING: CPT

## 2025-07-22 PROCEDURE — 97116 GAIT TRAINING THERAPY: CPT

## 2025-07-22 PROCEDURE — 1280000000 HC REHAB R&B

## 2025-07-22 PROCEDURE — 6360000002 HC RX W HCPCS: Performed by: STUDENT IN AN ORGANIZED HEALTH CARE EDUCATION/TRAINING PROGRAM

## 2025-07-22 PROCEDURE — 92526 ORAL FUNCTION THERAPY: CPT

## 2025-07-22 RX ADMIN — ACETAMINOPHEN 650 MG: 325 TABLET ORAL at 10:11

## 2025-07-22 RX ADMIN — PANTOPRAZOLE SODIUM 40 MG: 40 TABLET, DELAYED RELEASE ORAL at 09:26

## 2025-07-22 RX ADMIN — ENOXAPARIN SODIUM 30 MG: 100 INJECTION SUBCUTANEOUS at 20:27

## 2025-07-22 RX ADMIN — FUROSEMIDE 20 MG: 20 TABLET ORAL at 09:23

## 2025-07-22 RX ADMIN — DULOXETINE 60 MG: 60 CAPSULE, DELAYED RELEASE ORAL at 09:23

## 2025-07-22 RX ADMIN — PRAVASTATIN SODIUM 20 MG: 20 TABLET ORAL at 09:23

## 2025-07-22 RX ADMIN — METOPROLOL SUCCINATE 12.5 MG: 25 TABLET, EXTENDED RELEASE ORAL at 09:24

## 2025-07-22 ASSESSMENT — PAIN SCALES - GENERAL
PAINLEVEL_OUTOF10: 1
PAINLEVEL_OUTOF10: 5

## 2025-07-22 ASSESSMENT — PAIN SCALES - WONG BAKER: WONGBAKER_NUMERICALRESPONSE: NO HURT

## 2025-07-22 ASSESSMENT — PAIN DESCRIPTION - DESCRIPTORS: DESCRIPTORS: ACHING

## 2025-07-22 ASSESSMENT — PAIN DESCRIPTION - ORIENTATION: ORIENTATION: LEFT

## 2025-07-22 ASSESSMENT — PAIN - FUNCTIONAL ASSESSMENT: PAIN_FUNCTIONAL_ASSESSMENT: ACTIVITIES ARE NOT PREVENTED

## 2025-07-22 ASSESSMENT — PAIN DESCRIPTION - LOCATION: LOCATION: LEG;HIP

## 2025-07-22 NOTE — PROGRESS NOTES
ADDENDUM TO ARU PATIENT TREATMENT PLAN  [facility]    Patient Name: Edna Mcconnell        MRN: [id number]    : 1935  (89 y.o.)  Date: 2025     The [facility] Patient Treatment Plan for Edna Mcconnell has been updated as of 2025 to reflect the following changes:  Physical Therapy: ***90 minutes, Decreasing to 60 minutes 5 days/week due to _______________  Occupational Therapy: Increasing to 90 minutes 5 days/week due to functioning below baseline with ADL and IADL performance.    Speech Therapy: discharging from speech therapy services. Oropharyngeal swallow WFL.     Patient, MD, and treatment team all aware and in agreement.      Signature:  Jennifer Bezold MA CCC-SLP  Yoel Murphy, OTR/L

## 2025-07-22 NOTE — CARE COORDINATION
Weekly team care conference with interdisciplinary team on: 7/22/2025    Chart reviewed for length of stay. IP day # 5  Unit: ARU   Diagnosis and current status as per MD progress: Closed left hip fracture  Consultants Following: Physical Medicine  Planned Discharge Date: 8/2/25  Durable medical equipment needed: RW, manual W/C  Discharge Plan: Home w/ HHC    CM update: Spoke with patient, daughter Antonietta in room, and daughter Yudith over speaker phone to discuss discharge planning. Informed patient and daughters that discharge is planned for Saturday 8/2 w/ a recommendation of C. Patient has used [facility] in the past. Daughter Antonietta states she will plan to follow along with therapy today. Loy Zurita is agreeable to attend family training scheduled for Monday 7/28 from 1-2pm. Daughter Yudith expresses concerns related to her mother discharging home and inquires about SNF as a discharge option. Will plan to meet w/ patient, , and daughter Yudith after family training Monday to discuss discharge planning further. Loy Mane confirms that patient already owns the recommended RW but does not own a manual w/c- assured that I will continue to follow DME recs and provide at discharge as appropriate. Will continue to follow.    Suzanne Miner RN

## 2025-07-22 NOTE — PROGRESS NOTES
Occupational Therapy  Facility/Department: [facility]  Rehabilitation Occupational Therapy Daily Treatment Note    Date: 25  Patient Name: Edna Mcconnell       Room: 0158/0158-01  MRN: [id number]  Account: [id number]   : 1935  (89 y.o.) Gender: female     Pt was bathed during OT session this date. Pt was Showered with soap/water with Minimal Assist.     Past Medical History:  has a past medical history of Allergic rhinitis, Anxiety, Arthritis, Asthma, Back pain, GERD (gastroesophageal reflux disease), Goiter, Hyperlipidemia, Hypertension, MRSA (methicillin resistant staph aureus) culture positive, CARR[place] (obstructive sleep apnea), Rash, and Thyroid disease.  Past Surgical History:   has a past surgical history that includes Diagnostic Cardiac Cath Lab Procedure; Hysterectomy; Cholecystectomy; Rotator cuff repair; knee surgery; Breast surgery; Wrist surgery; Cataract removal; lumbar fusion (2018); eye surgery; Dilation and curettage of uterus; Total knee arthroplasty (Right, 2021); Total knee arthroplasty (Left, 2021); and Total hip arthroplasty (Left, 7/15/2025).    Restrictions  Restrictions/Precautions: Weight Bearing, Fall Risk, General Precautions  Hip Precautions: Anterior hip precautions  Other Position/Activity Restrictions: JUJU hose, monitor BP  Left Lower Extremity Weight Bearing: Weight Bearing As Tolerated    Subjective  Subjective: Patient supine in bed upon arrival. Agreeable to OT treatment session with no verbalized complaints of pain. Vitals: /52, HR 79, SpO2 97%.  Restrictions/Precautions: Weight Bearing;Fall Risk;General Precautions    Objective  Cognition  Overall Cognitive Status: Exceptions  Arousal/Alertness: Appears intact  Following Commands: Follows multistep commands with increased time;Follows multistep commands with repetition  Attention Span: Attends with cues to redirect  Memory: Decreased short term memory  Safety Judgement: Decreased awareness of need  Level: Contact Guard Assist  Supine to Sit  Assistance Level: Minimal assistance  Skilled Clinical Factors: assistance required with LLE.  Scooting  Assistance Level: Stand by assist  Skilled Clinical Factors: to scoot hips EOB.  Transfers  Surface: To chair with arms;To chair without arms;From chair with arms;From chair without arms  Additional Factors: Verbal cues;Increased time to complete  Device: Walker  Sit to Stand  Assistance Level: Contact guard assist  Stand to Sit  Assistance Level: Contact guard assist  Bed To/From Chair  Technique: Stand pivot  Assistance Level: Contact guard assist  Skilled Clinical Factors: with the use of a gait belt and RW.  Stand Pivot  Assistance Level: Contact guard assist  Skilled Clinical Factors: with the use of a gait belt and RW.     Assessment  Assessment  Assessment: Patient agreeable to OT treatment session. Patient required minimal assistance with bed mobility, contact guard assistance with functional transfer performance, and contact guard assistance with functional mobility during the session. Session focused on completion of ADL routine with patient requiring set-up assistance for hygiene and grooming tasks, set-up assistance with UB dressing, set-up assistance with UB bathing, contact guard assistance with LB dressing, minimal assistance with LB bathing, and dependent with footwear. Increased time required for completion of all activities, however patient noted with improved safety awareness during functional transfer performance with decreased cueing required from therapist. Continue POC.  Activity Tolerance: Patient tolerated treatment well  Discharge Recommendations: 24 hour supervision or assist;Home with Home health OT;S Level 1  OT Equipment Recommendations  Equipment Needed: No  Safety Devices  Safety Devices in place: Yes  Type of devices: All fall risk precautions in place;Call light within reach;Chair alarm in place;Left in chair;Nurse  notified  Restraints  Initially in place: No    Patient Education  Education  Education Given To: Patient  Education Provided: Role of Therapy;Plan of Care;Safety;Mobility Training;Cognition;DME/Home Modifications;Transfer Training;ADL Function;Home Exercise Program;Fall Prevention Strategies;Energy Conservation;Precautions  Education Provided Comments: home set-up for reducted falls, DME for ADLs, transfer training, BUE HEP, hip px  Education Method: Verbal;Demonstration  Barriers to Learning: Cognition  Education Outcome: Demonstrated understanding;Verbalized understanding;Continued education needed    Plan  Occupational Therapy Plan  Times Per Week: 5-7x/week  Current Treatment Recommendations: Balance training;Functional mobility training;Endurance training;Strengthening;Self-Care / ADL;Safety education & training;Equipment evaluation, education, & procurement;Patient/Caregiver education & training;ROM    Goals  Patient Goals   Patient goals : \"to get back home and do the things that I was doing before\"  Short Term Goals  Time Frame for Short Term Goals: 7/23/25  Short Term Goal 1: Patient will perform hygiene and grooming tasks in stance at the sink with CGA.  Short Term Goal 2: Patient will perform LB dressing tasks with CGA. GOAL MET 7/22  Short Term Goal 3: Patient will perform footwear management with the use of LRAD with CGA.  Short Term Goal 4: Patient will perform toileting tasks with CGA. GOAL MET 7/21  Short Term Goal 5: Patient will perform toilet transfers with CGA. GOAL MET 7/21  Long Term Goals  Time Frame for Long Term Goals : 7/31/25  Long Term Goal 1: Patient will perform FB dressing tasks with Nery.  Long Term Goal 2: Patient will perform FB bathing with Nery.  Long Term Goal 3: Patient will perform toileting tasks with Nery.  Long Term Goal 4: Patient will perform toilet transfers with Nery.  Long Term Goal 5: Patient will perform a simple IADL task with SPV.    Therapy Time   Individual

## 2025-07-22 NOTE — PROGRESS NOTES
Physical Therapy  Facility/Department: [facility]  Rehabilitation Physical Therapy Treatment Note    NAME: Edna Mcconnell  : 1935 (89 y.o.)  MRN: [id number]  CODE STATUS: Full Code    Date of Service: 25       Restrictions:  Restrictions/Precautions: Weight Bearing, Fall Risk, General Precautions  Lower Extremity Weight Bearing Restrictions  Left Lower Extremity Weight Bearing: Weight Bearing As Tolerated  Position Activity Restriction  Hip Precautions: Anterior hip precautions  Other Position/Activity Restrictions: JUJU hose, monitor BP     SUBJECTIVE  Subjective: Pt agreeable to work with PT this session.  Pain: Pt with mild L hip soreness at rest and with movement (did not rate on 0-10 scale).       OBJECTIVE  Cognition  Overall Cognitive Status: Exceptions  Arousal/Alertness: Appears intact  Following Commands: Follows multistep commands with increased time;Follows multistep commands with repetition  Attention Span: Attends with cues to redirect  Memory: Decreased short term memory  Safety Judgement: Decreased awareness of need for safety  Problem Solving: Assistance required to generate solutions;Assistance required to identify errors made;Assistance required to correct errors made  Insights: Appears intact  Initiation: Appears intact  Sequencing: Appears intact  Orientation  Overall Orientation Status: Impaired  Orientation Level: Oriented to person;Disoriented to time;Oriented to place;Oriented to situation    Functional Mobility  Balance  Sitting Balance: Supervision  Standing Balance: Contact guard assistance  Standing Balance  Activity: with RW d/t transfers  Transfers  Surface: From chair with arms;Wheelchair  Additional Factors: Verbal cues;Increased time to complete  Device: Walker  Sit to Stand  Assistance Level: Contact guard assist  Skilled Clinical Factors: increased time/effort  Stand to Sit  Assistance Level: Contact guard assist  Skilled Clinical Factors: RW      Environmental  to room throughout, citing less sleep last night. Notified RN of pain level and obtained cold pack for L hip when returned to room. Recommend continued skilled PT services to promote safe functional mobility, activity tolerance, LE strength and balance to reduce risk for falls and work toward safe discharge.  Activity Tolerance: Patient limited by fatigue;Patient limited by endurance  Discharge Recommendations: 24 hour supervision or assist;Home with Home health PT  PT Equipment Recommendations  Equipment Needed: Yes  Mobility Devices: Wheelchair  Wheelchair: Standard  Other: due to fluctuating activity tolerance/BP    Goals  Patient Goals   Patient Goals : \"To be independent again\"  Short Term Goals  Time Frame for Short Term Goals: 8 days (7/24)  Short Term Goal 1: Pt will perform supine > sit with min A, bed flat  Short Term Goal 2: Pt will perform STS with LRAD and min A - MET 7/21/25  Short Term Goal 3: Pt will perform SPT with LRAD and min A - MET 7/21/25  Short Term Goal 4: Pt will amb 20 ft with LRAD and mod A - MET 7/21/25  Short Term Goal 5: Pt will perform car transfer with min A  Long Term Goals  Time Frame for Long Term Goals : 15 days (7/31)  Long Term Goal 1: Pt will perform bed mobility IND with bed flat  Long Term Goal 2: Pt will perform STS with LRAD mod I  Long Term Goal 3: Pt will perform SPT with LRAD mod I  Long Term Goal 4: Pt will amb 150 ft with LRAD, SUP  Long Term Goal 5: Pt will ascend/descend 2 steps with CGA and LRAD    PLAN OF CARE/SAFETY  Physical Therapy Plan  General Plan: 5-7 times per week  Current Treatment Recommendations: Strengthening;Balance training;ROM;Functional mobility training;Transfer training;Endurance training;Gait training;Stair training;Neuromuscular re-education;Wheelchair mobility training;Pain management;Safety education & training;Home exercise program;Patient/Caregiver education & training;Therapeutic activities  Safety Devices  Type of Devices: All fall risk

## 2025-07-22 NOTE — PROGRESS NOTES
Edna Mcconnell  7/22/2025  [id number]    Chief Complaint: Closed left hip fracture, initial encounter ([facility])    Subjective:   No acute events overnight. Today Alina is seen in her room with therapy. She reports feeling well and denies acute complaints at this time.     No new labs.     ROS: denies f/c, n/v, cp, sob    Objective:  Patient Vitals for the past 24 hrs:   BP Temp Temp src Pulse Resp SpO2 Weight   07/22/25 0945 135/62 -- -- 67 -- 100 % --   07/22/25 0919 100/62 97.5 °F (36.4 °C) Oral 89 16 98 % --   07/22/25 0547 -- -- -- -- -- -- 77.8 kg (171 lb 8 oz)   07/21/25 2044 126/61 98.2 °F (36.8 °C) Oral 75 16 97 % --   07/21/25 1832 -- -- -- -- 14 -- --   07/21/25 1802 -- -- -- -- 16 -- --     Gen: No distress, pleasant.   HEENT: Normocephalic, atraumatic.   CV: extremities well perfused  Resp: No respiratory distress. On room air  Abd: Soft, nontender   Ext: No edema.   Neuro: Alert, oriented, appropriately interactive. Speech fluent  Psych: appropriate mood and affect    Wt Readings from Last 3 Encounters:   07/22/25 77.8 kg (171 lb 8 oz)   07/16/25 90.4 kg (199 lb 4.7 oz)   02/02/24 81.6 kg (180 lb)       Laboratory data:   Lab Results   Component Value Date    WBC 9.3 07/21/2025    HGB 9.9 (L) 07/21/2025    HCT 29.4 (L) 07/21/2025    MCV 94.5 07/21/2025     07/21/2025       Lab Results   Component Value Date/Time     07/21/2025 06:40 AM    K 3.8 07/21/2025 06:40 AM     07/21/2025 06:40 AM    CO2 25 07/21/2025 06:40 AM    BUN 16 07/21/2025 06:40 AM    CREATININE 1.0 07/21/2025 06:40 AM    GLUCOSE 106 07/21/2025 06:40 AM    CALCIUM 8.5 07/21/2025 06:40 AM        Therapy progress:  Physical therapy:  Bed Mobility:     Sit>supine:  Assistance Level: Minimal assistance  Skilled Clinical Factors: To guide LLE into bed, HOB at 0 degrees, increased time/effort  Supine>sit:  Assistance Level: Minimal assistance  Skilled Clinical Factors: for LLE  Transfers:  Surface: From chair with arms,

## 2025-07-22 NOTE — PLAN OF CARE
Problem: Discharge Planning  Goal: Discharge to home or other facility with appropriate resources  7/22/2025 1412 by Lucita Casper, RN  Outcome: Progressing  7/22/2025 0209 by Lisbet Mo RN  Outcome: Progressing     Problem: Pain  Goal: Verbalizes/displays adequate comfort level or baseline comfort level  7/22/2025 1412 by Lucita Casper, RN  Outcome: Progressing  7/22/2025 0209 by Lisbet Mo, RN  Outcome: Progressing

## 2025-07-22 NOTE — PROGRESS NOTES
[facility]: ACUTE REHAB UNIT  SPEECH-LANGUAGE PATHOLOGY      [x] Daily  [] Weekly Care Conference Note  [x] Discharge    Patient:Edna Mcconnell      :1935  MRN:[id number]  Rehab Dx/Hx: Closed left hip fracture, initial encounter ([facility]) [S72.002A]    Precautions: falls and aspirations  Home situation: Lives with spouse  ST Dx: [] Aphasia  [] Dysarthria  [] Apraxia   [x] Oropharyngeal dysphagia [] Cognitive Impairment  [] Other:   Date of Admit: 2025  Room #: 0158/0158-01    Current functional status (updated daily):         Pt being seen for : [] Speech/Language Treatment  [x] Dysphagia Treatment [] Cognitive Treatment  [] Other:  Communication: [x]WFL  [] Aphasia  [] Dysarthria  [] Apraxia  [] Pragmatic Impairment [] Non-verbal  [] Hearing Loss  [] Other:   Cognition: [x] WFL  [] Mild  [] Moderate  [] Severe [] Unable to Assess  [] Other:  Memory: [x] WFL  [] Mild  [] Moderate  [] Severe [] Unable to Assess  [] Other:  Behavior: [x] Alert  [x] Cooperative  [x]  Pleasant  [] Confused  [] Agitated  [] Uncooperative  [] Distractible [] Motivated  [] Self-Limiting [] Anxious  [] Other:  Endurance:  [x] Adequate for participation in SLP sessions  [] Reduced overall  [] Lethargic  [] Other:  Safety: [x] No concerns at this time  [] Reduced insight into deficits  []  Reduced safety awareness [] Not following call light procedures  [] Unable to Assess  [] Other:    Current Diet Order:ADULT DIET; Regular  Swallowing Precautions: upright for all intake, stay upright for at least 30 mins after intake, small bites/sips, distant supervision with intake, oral care 2-3x/day to reduce adverse affects in the event of aspiration, increase physical mobility as able, alternate bites/sips, slow rate of intake, STRICT aspiration precautions, and hold PO and contact SLP if s/s of aspiration or worsening respiratory status develop.         Date: 2025      Tx session 1  1030 - 1100 DISCHARGE SUMMARY   Total Timed Code Min 0

## 2025-07-22 NOTE — PLAN OF CARE
Problem: Pain  Goal: Verbalizes/displays adequate comfort level or baseline comfort level  7/22/2025 0209 by Lisbet Mo, RN  Outcome: Progressing  7/21/2025 1711 by Amanda Castanon, RN  Outcome: Progressing     Problem: Skin/Tissue Integrity  Goal: Skin integrity remains intact  Description: 1.  Monitor for areas of redness and/or skin breakdown  2.  Assess vascular access sites hourly  3.  Every 4-6 hours minimum:  Change oxygen saturation probe site  4.  Every 4-6 hours:  If on nasal continuous positive airway pressure, respiratory therapy assess nares and determine need for appliance change or resting period  7/22/2025 0209 by Lisbet Mo, RN  Outcome: Progressing  7/21/2025 1711 by Amanda Castanon, RN  Outcome: Progressing  Flowsheets (Taken 7/21/2025 1710)  Skin Integrity Remains Intact: Monitor for areas of redness and/or skin breakdown

## 2025-07-23 ENCOUNTER — APPOINTMENT (OUTPATIENT)
Dept: GENERAL RADIOLOGY | Age: 89
DRG: 560 | End: 2025-07-23
Attending: STUDENT IN AN ORGANIZED HEALTH CARE EDUCATION/TRAINING PROGRAM
Payer: MEDICARE

## 2025-07-23 LAB
ANION GAP SERPL CALCULATED.3IONS-SCNC: 9 MMOL/L (ref 3–16)
BASOPHILS # BLD: 0.1 K/UL (ref 0–0.2)
BASOPHILS NFR BLD: 0.6 %
BUN SERPL-MCNC: 16 MG/DL (ref 7–20)
CALCIUM SERPL-MCNC: 8.5 MG/DL (ref 8.3–10.6)
CHLORIDE SERPL-SCNC: 104 MMOL/L (ref 99–110)
CO2 SERPL-SCNC: 27 MMOL/L (ref 21–32)
CREAT SERPL-MCNC: 1.1 MG/DL (ref 0.6–1.2)
DEPRECATED RDW RBC AUTO: 13.6 % (ref 12.4–15.4)
EOSINOPHIL # BLD: 0.6 K/UL (ref 0–0.6)
EOSINOPHIL NFR BLD: 6.5 %
GFR SERPLBLD CREATININE-BSD FMLA CKD-EPI: 48 ML/MIN/{1.73_M2}
GLUCOSE SERPL-MCNC: 108 MG/DL (ref 70–99)
HCT VFR BLD AUTO: 29.1 % (ref 36–48)
HGB BLD-MCNC: 9.8 G/DL (ref 12–16)
LYMPHOCYTES # BLD: 1.7 K/UL (ref 1–5.1)
LYMPHOCYTES NFR BLD: 17.4 %
MCH RBC QN AUTO: 31.8 PG (ref 26–34)
MCHC RBC AUTO-ENTMCNC: 33.7 G/DL (ref 31–36)
MCV RBC AUTO: 94.3 FL (ref 80–100)
MONOCYTES # BLD: 0.8 K/UL (ref 0–1.3)
MONOCYTES NFR BLD: 8.4 %
NEUTROPHILS # BLD: 6.5 K/UL (ref 1.7–7.7)
NEUTROPHILS NFR BLD: 67.1 %
PLATELET # BLD AUTO: 370 K/UL (ref 135–450)
PMV BLD AUTO: 8 FL (ref 5–10.5)
POTASSIUM SERPL-SCNC: 3.9 MMOL/L (ref 3.5–5.1)
RBC # BLD AUTO: 3.09 M/UL (ref 4–5.2)
SODIUM SERPL-SCNC: 140 MMOL/L (ref 136–145)
WBC # BLD AUTO: 9.6 K/UL (ref 4–11)

## 2025-07-23 PROCEDURE — 36415 COLL VENOUS BLD VENIPUNCTURE: CPT

## 2025-07-23 PROCEDURE — 6360000002 HC RX W HCPCS: Performed by: STUDENT IN AN ORGANIZED HEALTH CARE EDUCATION/TRAINING PROGRAM

## 2025-07-23 PROCEDURE — 6370000000 HC RX 637 (ALT 250 FOR IP): Performed by: STUDENT IN AN ORGANIZED HEALTH CARE EDUCATION/TRAINING PROGRAM

## 2025-07-23 PROCEDURE — 97535 SELF CARE MNGMENT TRAINING: CPT

## 2025-07-23 PROCEDURE — 80048 BASIC METABOLIC PNL TOTAL CA: CPT

## 2025-07-23 PROCEDURE — 97530 THERAPEUTIC ACTIVITIES: CPT

## 2025-07-23 PROCEDURE — 85025 COMPLETE CBC W/AUTO DIFF WBC: CPT

## 2025-07-23 PROCEDURE — 1280000000 HC REHAB R&B

## 2025-07-23 PROCEDURE — 97116 GAIT TRAINING THERAPY: CPT

## 2025-07-23 PROCEDURE — 97110 THERAPEUTIC EXERCISES: CPT

## 2025-07-23 PROCEDURE — 73030 X-RAY EXAM OF SHOULDER: CPT

## 2025-07-23 RX ADMIN — METOPROLOL SUCCINATE 12.5 MG: 25 TABLET, EXTENDED RELEASE ORAL at 08:03

## 2025-07-23 RX ADMIN — HYDROCODONE BITARTRATE AND ACETAMINOPHEN 1 TABLET: 5; 325 TABLET ORAL at 12:25

## 2025-07-23 RX ADMIN — PANTOPRAZOLE SODIUM 40 MG: 40 TABLET, DELAYED RELEASE ORAL at 06:32

## 2025-07-23 RX ADMIN — Medication 3 MG: at 20:53

## 2025-07-23 RX ADMIN — ENOXAPARIN SODIUM 30 MG: 100 INJECTION SUBCUTANEOUS at 20:53

## 2025-07-23 RX ADMIN — PRAVASTATIN SODIUM 20 MG: 20 TABLET ORAL at 08:03

## 2025-07-23 RX ADMIN — FUROSEMIDE 20 MG: 20 TABLET ORAL at 08:03

## 2025-07-23 RX ADMIN — DULOXETINE 60 MG: 60 CAPSULE, DELAYED RELEASE ORAL at 09:56

## 2025-07-23 ASSESSMENT — PAIN SCALES - GENERAL: PAINLEVEL_OUTOF10: 8

## 2025-07-23 NOTE — PROGRESS NOTES
Physical Therapy  Facility/Department: [facility]  Rehabilitation Physical Therapy Treatment Note    NAME: Edna Mcconnell  : 1935 (89 y.o.)  MRN: [id number]  CODE STATUS: Full Code    Date of Service: 25       Restrictions:  Restrictions/Precautions: Weight Bearing, Fall Risk, General Precautions  Lower Extremity Weight Bearing Restrictions  Left Lower Extremity Weight Bearing: Weight Bearing As Tolerated  Position Activity Restriction  Hip Precautions: Anterior hip precautions  Other Position/Activity Restrictions: JUJU hose, monitor BP     SUBJECTIVE  Subjective: \"I didn't sleep much last night\"  Pain: 0/10       OBJECTIVE  Cognition  Overall Cognitive Status: Exceptions  Arousal/Alertness: Appears intact  Following Commands: Follows multistep commands with increased time;Follows multistep commands with repetition  Attention Span: Attends with cues to redirect  Memory: Decreased short term memory  Safety Judgement: Decreased awareness of need for safety  Problem Solving: Assistance required to generate solutions;Assistance required to identify errors made;Assistance required to correct errors made  Insights: Appears intact  Initiation: Appears intact  Sequencing: Appears intact  Orientation  Overall Orientation Status: Impaired  Orientation Level: Oriented to person;Disoriented to time;Oriented to place;Oriented to situation (stated the month was July, but did not know the year. Verbalized the year was  with category cues.)    Functional Mobility  Sit to Supine  Assistance Level: Contact guard assist  Skilled Clinical Factors: bed flat  Balance  Sitting Balance: Independent  Standing Balance: Stand by assistance  Transfers  Surface: From chair with arms;Wheelchair;To bed  Additional Factors: Verbal cues;Increased time to complete  Device: Walker  Sit to Stand  Assistance Level: Contact guard assist  Skilled Clinical Factors: increased time/effort  Stand to Sit  Assistance Level: Contact guard

## 2025-07-23 NOTE — PROGRESS NOTES
Edna Mcconnell  7/23/2025  [id number]    Chief Complaint: Closed left hip fracture, initial encounter ([facility])    Subjective:   No acute events overnight. Today Alina is seen in her room. She reports difficulty sleeping last night due to pain. Of note, she did not take any pain medications yesterday. She reports taking lasix daily at home.     Personally reviewed labs.     ROS: denies f/c, n/v, cp, sob    Objective:  Patient Vitals for the past 24 hrs:   BP Temp Temp src Pulse Resp SpO2 Weight   07/23/25 0820 124/62 -- -- (!) 126 -- -- --   07/23/25 0800 (!) 116/55 97.2 °F (36.2 °C) Oral 96 14 98 % --   07/23/25 0600 -- -- -- -- -- -- 78.7 kg (173 lb 8 oz)   07/22/25 2015 119/76 98.2 °F (36.8 °C) Oral 76 16 100 % --     Gen: No distress, pleasant.   HEENT: Normocephalic, atraumatic.   CV: extremities well perfused  Resp: No respiratory distress. On room air  Abd: Soft, nondistended  Ext: No edema.   Neuro: Alert, oriented, appropriately interactive. Speech fluent  Psych: appropriate mood and affect    Wt Readings from Last 3 Encounters:   07/23/25 78.7 kg (173 lb 8 oz)   07/16/25 90.4 kg (199 lb 4.7 oz)   02/02/24 81.6 kg (180 lb)       Laboratory data:   Lab Results   Component Value Date    WBC 9.6 07/23/2025    HGB 9.8 (L) 07/23/2025    HCT 29.1 (L) 07/23/2025    MCV 94.3 07/23/2025     07/23/2025       Lab Results   Component Value Date/Time     07/23/2025 06:21 AM    K 3.9 07/23/2025 06:21 AM     07/23/2025 06:21 AM    CO2 27 07/23/2025 06:21 AM    BUN 16 07/23/2025 06:21 AM    CREATININE 1.1 07/23/2025 06:21 AM    GLUCOSE 108 07/23/2025 06:21 AM    CALCIUM 8.5 07/23/2025 06:21 AM        Therapy progress:  Physical therapy:  Bed Mobility:     Sit>supine:  Assistance Level: Contact guard assist  Skilled Clinical Factors: bed flat  Supine>sit:  Assistance Level: Minimal assistance  Skilled Clinical Factors: for LLE  Transfers:  Surface: From chair with arms, Wheelchair, To bed  Additional

## 2025-07-23 NOTE — CARE COORDINATION
CM update: Received call from daughter Yudith requesting to reschedule family training. Daughter reschedules family training for Friday 8/1 from 12:30-1:30pm. Determined with daughter Yudith that I will call her after Care Conference next week to discuss progress and have further discharge planning conversation. Currently discharge is planned for Saturday 8/2 w/ a recommendation of discharge home with C. Patient has used [facility] in the past- will confirm if agreeable to resume care through them at discharge. Will continue to follow.    Suzanne Miner RN

## 2025-07-23 NOTE — PROGRESS NOTES
Occupational Therapy  Facility/Department: [facility]  Rehabilitation Occupational Therapy Daily Treatment Note    Date: 25  Patient Name: Edna Mcconnell       Room: 0158/0158-01  MRN: [id number]  Account: [id number]   : 1935  (89 y.o.) Gender: female     Past Medical History:  has a past medical history of Allergic rhinitis, Anxiety, Arthritis, Asthma, Back pain, GERD (gastroesophageal reflux disease), Goiter, Hyperlipidemia, Hypertension, MRSA (methicillin resistant staph aureus) culture positive, CARR[place] (obstructive sleep apnea), Rash, and Thyroid disease.  Past Surgical History:   has a past surgical history that includes Diagnostic Cardiac Cath Lab Procedure; Hysterectomy; Cholecystectomy; Rotator cuff repair; knee surgery; Breast surgery; Wrist surgery; Cataract removal; lumbar fusion (2018); eye surgery; Dilation and curettage of uterus; Total knee arthroplasty (Right, 2021); Total knee arthroplasty (Left, 2021); and Total hip arthroplasty (Left, 7/15/2025).    Restrictions  Restrictions/Precautions: Weight Bearing, Fall Risk, General Precautions  Hip Precautions: Anterior hip precautions  Other Position/Activity Restrictions: JUJU hose, monitor BP  Left Lower Extremity Weight Bearing: Weight Bearing As Tolerated    Subjective  Subjective: Patient supine in bed upon arrival. Agreeable to OT treatment session with no verbalized complaints of pain. Vitals: BP 96/75, HR 91, SpO2 97%. Vitals re-taken once seated EOB with BP reading of 105/64.  Restrictions/Precautions: Weight Bearing;Fall Risk;General Precautions    Objective  Cognition  Overall Cognitive Status: Exceptions  Arousal/Alertness: Appears intact  Following Commands: Follows multistep commands with increased time;Follows multistep commands with repetition  Attention Span: Attends with cues to redirect  Memory: Decreased short term memory  Safety Judgement: Decreased awareness of need for safety  Problem Solving: Assistance

## 2025-07-24 LAB
BILIRUB UR QL STRIP.AUTO: NEGATIVE
CLARITY UR: CLEAR
COLOR UR: YELLOW
GLUCOSE UR STRIP.AUTO-MCNC: NEGATIVE MG/DL
HGB UR QL STRIP.AUTO: NEGATIVE
KETONES UR STRIP.AUTO-MCNC: NEGATIVE MG/DL
LEUKOCYTE ESTERASE UR QL STRIP.AUTO: NEGATIVE
NITRITE UR QL STRIP.AUTO: NEGATIVE
PH UR STRIP.AUTO: 6 [PH] (ref 5–8)
PROT UR STRIP.AUTO-MCNC: NEGATIVE MG/DL
SP GR UR STRIP.AUTO: 1.01 (ref 1–1.03)
UA COMPLETE W REFLEX CULTURE PNL UR: NORMAL
UA DIPSTICK W REFLEX MICRO PNL UR: NORMAL
URN SPEC COLLECT METH UR: NORMAL
UROBILINOGEN UR STRIP-ACNC: 0.2 E.U./DL

## 2025-07-24 PROCEDURE — 1280000000 HC REHAB R&B

## 2025-07-24 PROCEDURE — 97110 THERAPEUTIC EXERCISES: CPT

## 2025-07-24 PROCEDURE — 6360000002 HC RX W HCPCS: Performed by: STUDENT IN AN ORGANIZED HEALTH CARE EDUCATION/TRAINING PROGRAM

## 2025-07-24 PROCEDURE — 97542 WHEELCHAIR MNGMENT TRAINING: CPT

## 2025-07-24 PROCEDURE — 97530 THERAPEUTIC ACTIVITIES: CPT

## 2025-07-24 PROCEDURE — 97116 GAIT TRAINING THERAPY: CPT

## 2025-07-24 PROCEDURE — 81003 URINALYSIS AUTO W/O SCOPE: CPT

## 2025-07-24 PROCEDURE — 6370000000 HC RX 637 (ALT 250 FOR IP): Performed by: STUDENT IN AN ORGANIZED HEALTH CARE EDUCATION/TRAINING PROGRAM

## 2025-07-24 RX ADMIN — Medication 3 MG: at 21:01

## 2025-07-24 RX ADMIN — DULOXETINE 60 MG: 60 CAPSULE, DELAYED RELEASE ORAL at 09:13

## 2025-07-24 RX ADMIN — PRAVASTATIN SODIUM 20 MG: 20 TABLET ORAL at 09:13

## 2025-07-24 RX ADMIN — HYDROCODONE BITARTRATE AND ACETAMINOPHEN 1 TABLET: 5; 325 TABLET ORAL at 09:13

## 2025-07-24 RX ADMIN — FUROSEMIDE 20 MG: 20 TABLET ORAL at 09:13

## 2025-07-24 RX ADMIN — HYDROCODONE BITARTRATE AND ACETAMINOPHEN 1 TABLET: 5; 325 TABLET ORAL at 21:00

## 2025-07-24 RX ADMIN — PANTOPRAZOLE SODIUM 40 MG: 40 TABLET, DELAYED RELEASE ORAL at 05:57

## 2025-07-24 RX ADMIN — METOPROLOL SUCCINATE 12.5 MG: 25 TABLET, EXTENDED RELEASE ORAL at 09:13

## 2025-07-24 RX ADMIN — ENOXAPARIN SODIUM 30 MG: 100 INJECTION SUBCUTANEOUS at 21:01

## 2025-07-24 RX ADMIN — POLYETHYLENE GLYCOL 3350 17 G: 17 POWDER, FOR SOLUTION ORAL at 21:01

## 2025-07-24 ASSESSMENT — PAIN - FUNCTIONAL ASSESSMENT: PAIN_FUNCTIONAL_ASSESSMENT: PREVENTS OR INTERFERES WITH MANY ACTIVE NOT PASSIVE ACTIVITIES

## 2025-07-24 ASSESSMENT — PAIN DESCRIPTION - ORIENTATION: ORIENTATION: LEFT

## 2025-07-24 ASSESSMENT — PAIN SCALES - GENERAL
PAINLEVEL_OUTOF10: 2
PAINLEVEL_OUTOF10: 3
PAINLEVEL_OUTOF10: 8

## 2025-07-24 ASSESSMENT — PAIN DESCRIPTION - LOCATION
LOCATION: HIP
LOCATION: LEG

## 2025-07-24 NOTE — PROGRESS NOTES
Physical Therapy  Facility/Department: [facility]  Rehabilitation Physical Therapy Treatment Note    NAME: Edna Mcconnell  : 1935 (89 y.o.)  MRN: [id number]  CODE STATUS: Full Code    Date of Service: 25       Restrictions:  Restrictions/Precautions: Weight Bearing, Fall Risk, General Precautions  Lower Extremity Weight Bearing Restrictions  Left Lower Extremity Weight Bearing: Weight Bearing As Tolerated  Position Activity Restriction  Hip Precautions: Anterior hip precautions  Other Position/Activity Restrictions: JUJU hose, monitor BP     SUBJECTIVE  Subjective: Patient is agreeable to PT session, daughter present partway through session  Pain: 2-3/10   Donned JUJU hose after using bathroom    OBJECTIVE  Orientation  Overall Orientation Status: Impaired  Orientation Level: Oriented X4    Functional Mobility  Balance  Sitting Balance: Independent  Standing Balance: Stand by assistance  Standing Balance  Time: 2'45\" + 1'26\" + 1'20\"  Activity: standing at bathroom sink for handwashing/grooming / standing at RW performing BITS visual scanning x1 (ABC) and memory x2 (words to pictures, 2-4 sequences, 65% accuracy) VC for upright posture throughout  Transfers  Surface: Raised toilet Seat;To chair with arms;Wheelchair;From chair with arms  Additional Factors: Verbal cues;Increased time to complete  Device: Walker  Sit to Stand  Assistance Level: Contact guard assist  Skilled Clinical Factors: increased time/effort  Stand to Sit  Assistance Level: Contact guard assist      Environmental Mobility  Ambulation  Surface: Level surface  Device: Rolling walker  Distance: 36' + 48'  Activity: Within Unit;Within Room  Activity Comments: Pt ambulates with slow guillaume, short step length, downward gaze, forward flexed.  Additional Factors: Verbal cues;Increased time to complete  Assistance Level: Contact guard assist  Gait Deviations: Decreased step length bilateral;Slow guillaume;Wide base of support;Decreased weight  again\"  Short Term Goals  Time Frame for Short Term Goals: 8 days (7/24)  Short Term Goal 1: Pt will perform supine > sit with min A, bed flat - MET 7/22/25  Short Term Goal 2: Pt will perform STS with LRAD and min A - MET 7/21/25  Short Term Goal 3: Pt will perform SPT with LRAD and min A - MET 7/21/25  Short Term Goal 4: Pt will amb 20 ft with LRAD and mod A - MET 7/21/25  Short Term Goal 5: Pt will perform car transfer with min A - MET 7/22/25  Long Term Goals  Time Frame for Long Term Goals : 15 days (7/31)  Long Term Goal 1: Pt will perform bed mobility IND with bed flat  Long Term Goal 2: Pt will perform STS with LRAD mod I  Long Term Goal 3: Pt will perform SPT with LRAD mod I  Long Term Goal 4: Pt will amb 150 ft with LRAD, SUP  Long Term Goal 5: Pt will ascend/descend 2 steps with CGA and LRAD    PLAN OF CARE/SAFETY  Physical Therapy Plan  General Plan: 5-7 times per week  Current Treatment Recommendations: Strengthening;Balance training;ROM;Functional mobility training;Transfer training;Endurance training;Gait training;Stair training;Neuromuscular re-education;Wheelchair mobility training;Pain management;Safety education & training;Home exercise program;Patient/Caregiver education & training;Therapeutic activities  Safety Devices  Type of Devices: All fall risk precautions in place;Call light within reach;Gait belt;Patient at risk for falls;Nurse notified;Heels elevated for pressure relief;Left in chair;Chair alarm in place    EDUCATION  Education  Education Given To: Patient;Family  Education Provided: Role of Therapy;Mobility Training;Plan of Care;Precautions;Safety;Equipment;Transfer Training;DME/Home Modifications;Fall Prevention Strategies  Education Provided Comments: Pt educated on role of PT in ARU, safety in transfers/amb with RW, activity pacing. Reinforced goals of PT and purpose of PT session  Education Method: Demonstration;Verbal  Barriers to Learning: None  Education Outcome: Verbalized

## 2025-07-24 NOTE — CARE COORDINATION
CM update: patient lives with  in a ranch-style house w/ 2STE. Patient has strong support from daughters who are both RN's. Discharge home is planned for Saturday 8/2 w/ a recommendation of [facility]. Patient has used [facility] in the past. Daughter Antonietta has attended therapy sessions. Daughter Yudith has family training scheduled for Friday 8/1 from 12:30-1:30. Confirmed with saman Zurita that I will call do update with progress after care conference meeting next week- will confirm [facility] preference at that time. Will also plan to meet w/ daughter after family training. Patient owns the recommended RW but does not own a manual w/c- will continue to follow DME recs and provide at discharge as appropriate. [facility] referral has been made.     Suzanne Miner RN

## 2025-07-24 NOTE — PROGRESS NOTES
Edna Mcconnell  7/24/2025  [id number]    Chief Complaint: Closed left hip fracture, initial encounter ([facility])    Subjective:   No acute events overnight. Today Alina is seen with staff and then later her daughter present. Family notes that she appeared confused last night. Patient notes feeling confused as well. Will obtain UA.     No new labs.     ROS: denies f/c, n/v, cp, sob    Objective:  Patient Vitals for the past 24 hrs:   BP Temp Temp src Pulse Resp SpO2   07/24/25 0758 130/60 98.2 °F (36.8 °C) Oral 91 17 98 %   07/24/25 0745 130/60 98.2 °F (36.8 °C) Oral 91 17 98 %   07/23/25 2045 117/88 98.8 °F (37.1 °C) Oral 86 16 100 %     Gen: No distress, pleasant.   HEENT: Normocephalic, atraumatic.   CV: extremities well perfused  Resp: No respiratory distress. On room air  Abd: Soft, nondistended  Ext: No edema.   Neuro: Alert, oriented, appropriately interactive. Speech fluent  Psych: appropriate mood and affect    Wt Readings from Last 3 Encounters:   07/23/25 78.7 kg (173 lb 8 oz)   07/16/25 90.4 kg (199 lb 4.7 oz)   02/02/24 81.6 kg (180 lb)       Laboratory data:   Lab Results   Component Value Date    WBC 9.6 07/23/2025    HGB 9.8 (L) 07/23/2025    HCT 29.1 (L) 07/23/2025    MCV 94.3 07/23/2025     07/23/2025       Lab Results   Component Value Date/Time     07/23/2025 06:21 AM    K 3.9 07/23/2025 06:21 AM     07/23/2025 06:21 AM    CO2 27 07/23/2025 06:21 AM    BUN 16 07/23/2025 06:21 AM    CREATININE 1.1 07/23/2025 06:21 AM    GLUCOSE 108 07/23/2025 06:21 AM    CALCIUM 8.5 07/23/2025 06:21 AM        Therapy progress:  Physical therapy:  Bed Mobility:     Sit>supine:  Assistance Level: Contact guard assist  Skilled Clinical Factors: bed flat  Supine>sit:  Assistance Level: Minimal assistance  Skilled Clinical Factors: for LLE  Transfers:  Surface: Raised toilet Seat, To chair with arms, Wheelchair, From chair with arms  Additional Factors: Verbal cues, Increased time to complete  Device:

## 2025-07-24 NOTE — PROGRESS NOTES
Occupational Therapy  Facility/Department: [facility]  Rehabilitation Occupational Therapy Daily Treatment Note    Date: 25  Patient Name: Edna Mcconnell       Room: 0158/0158-01  MRN: [id number]  Account: [id number]   : 1935  (89 y.o.) Gender: female     Past Medical History:  has a past medical history of Allergic rhinitis, Anxiety, Arthritis, Asthma, Back pain, GERD (gastroesophageal reflux disease), Goiter, Hyperlipidemia, Hypertension, MRSA (methicillin resistant staph aureus) culture positive, CARRINGTON (obstructive sleep apnea), Rash, and Thyroid disease.  Past Surgical History:   has a past surgical history that includes Diagnostic Cardiac Cath Lab Procedure; Hysterectomy; Cholecystectomy; Rotator cuff repair; knee surgery; Breast surgery; Wrist surgery; Cataract removal; lumbar fusion (2018); eye surgery; Dilation and curettage of uterus; Total knee arthroplasty (Right, 2021); Total knee arthroplasty (Left, 2021); and Total hip arthroplasty (Left, 7/15/2025).    Restrictions  Restrictions/Precautions: Weight Bearing, Fall Risk, General Precautions  Hip Precautions: Anterior hip precautions  Other Position/Activity Restrictions: JUJU hose, monitor BP  Left Lower Extremity Weight Bearing: Weight Bearing As Tolerated    Subjective  Subjective: Patient sitting in recliner upon arrival with daughter present. Agreeable to OT treatment session with no verbalized complaints of pain. Vitals: /63, , SpO2 98%.    Objective  Cognition  Overall Cognitive Status: Exceptions  Arousal/Alertness: Appears intact  Following Commands: Follows multistep commands with increased time;Follows multistep commands with repetition  Attention Span: Attends with cues to redirect  Memory: Decreased short term memory  Safety Judgement: Decreased awareness of need for safety  Problem Solving: Assistance required to generate solutions;Assistance required to identify errors made;Assistance required to

## 2025-07-24 NOTE — PROGRESS NOTES
Comprehensive Nutrition Assessment    Type and Reason for Visit:  Reassess    Nutrition Recommendations/Plan:   Continue general diet   Encourage PO intakes as tolerated   Monitor nutrition adequacy, pertinent labs, bowel habits, wt changes, and clinical progress     Malnutrition Assessment:  Malnutrition Status:  At risk for malnutrition (07/24/25 1222)    Context:  Acute Illness     Findings of the 6 clinical characteristics of malnutrition:  Energy Intake:  Mild decrease in energy intake  Weight Loss:  Unable to assess     Body Fat Loss:  No body fat loss     Muscle Mass Loss:  No muscle mass loss    Fluid Accumulation:  Unable to assess     Strength:  Not Performed    Nutrition Assessment:    Follow up: Pt remains nutritionally at risk AEB decreased appetite and PO intakes. Pt reports appetite lower here than at home. States normally eating 3 meals daily at home, cooks for spouse at home. PO intakes of 1-100% per EMR. Pt ate ~50% of lunch this afternoon. Weights stable this admission. Encouraged PO intakes as tolerated for healing s/p surgery. Will continue to monitor further nutritional needs.    Nutrition Related Findings:    Active BS. BM on 7/21. Labs reviewed. Wound Type: Surgical Incision       Current Nutrition Intake & Therapies:    Average Meal Intake: 1-25%, %  Average Supplements Intake: None Ordered  ADULT DIET; Regular    Anthropometric Measures:  Height: 160 cm (5' 3\")  Ideal Body Weight (IBW): 115 lbs (52 kg)    Admission Body Weight: 78.5 kg (173 lb) (bed scale)  Current Body Weight: 78.5 kg (173 lb), 151.1 % IBW. Weight Source: Standing scale  Current BMI (kg/m2): 30.7           Weight Adjustment For: No Adjustment                 BMI Categories: Obese Class 1 (BMI 30.0-34.9)    Estimated Daily Nutrient Needs:  Energy Requirements Based On: Kcal/kg  Weight Used for Energy Requirements: Current  Energy (kcal/day): 1580 - 1738 (20-22kcal/kg)  Weight Used for Protein Requirements:

## 2025-07-25 LAB
ANION GAP SERPL CALCULATED.3IONS-SCNC: 10 MMOL/L (ref 3–16)
BASOPHILS # BLD: 0.1 K/UL (ref 0–0.2)
BASOPHILS NFR BLD: 1 %
BUN SERPL-MCNC: 16 MG/DL (ref 7–20)
CALCIUM SERPL-MCNC: 8.6 MG/DL (ref 8.3–10.6)
CHLORIDE SERPL-SCNC: 103 MMOL/L (ref 99–110)
CO2 SERPL-SCNC: 26 MMOL/L (ref 21–32)
CREAT SERPL-MCNC: 1.1 MG/DL (ref 0.6–1.2)
DEPRECATED RDW RBC AUTO: 14.2 % (ref 12.4–15.4)
EOSINOPHIL # BLD: 0.7 K/UL (ref 0–0.6)
EOSINOPHIL NFR BLD: 8.5 %
GFR SERPLBLD CREATININE-BSD FMLA CKD-EPI: 48 ML/MIN/{1.73_M2}
GLUCOSE SERPL-MCNC: 108 MG/DL (ref 70–99)
HCT VFR BLD AUTO: 28.4 % (ref 36–48)
HGB BLD-MCNC: 9.7 G/DL (ref 12–16)
LYMPHOCYTES # BLD: 1.8 K/UL (ref 1–5.1)
LYMPHOCYTES NFR BLD: 22.8 %
MCH RBC QN AUTO: 32 PG (ref 26–34)
MCHC RBC AUTO-ENTMCNC: 34.1 G/DL (ref 31–36)
MCV RBC AUTO: 94.1 FL (ref 80–100)
MONOCYTES # BLD: 0.7 K/UL (ref 0–1.3)
MONOCYTES NFR BLD: 8.8 %
NEUTROPHILS # BLD: 4.7 K/UL (ref 1.7–7.7)
NEUTROPHILS NFR BLD: 58.9 %
PLATELET # BLD AUTO: 364 K/UL (ref 135–450)
PMV BLD AUTO: 7.6 FL (ref 5–10.5)
POTASSIUM SERPL-SCNC: 3.9 MMOL/L (ref 3.5–5.1)
RBC # BLD AUTO: 3.02 M/UL (ref 4–5.2)
SODIUM SERPL-SCNC: 139 MMOL/L (ref 136–145)
WBC # BLD AUTO: 8 K/UL (ref 4–11)

## 2025-07-25 PROCEDURE — 6370000000 HC RX 637 (ALT 250 FOR IP): Performed by: STUDENT IN AN ORGANIZED HEALTH CARE EDUCATION/TRAINING PROGRAM

## 2025-07-25 PROCEDURE — 1280000000 HC REHAB R&B

## 2025-07-25 PROCEDURE — 97530 THERAPEUTIC ACTIVITIES: CPT

## 2025-07-25 PROCEDURE — 36415 COLL VENOUS BLD VENIPUNCTURE: CPT

## 2025-07-25 PROCEDURE — 97110 THERAPEUTIC EXERCISES: CPT

## 2025-07-25 PROCEDURE — 97112 NEUROMUSCULAR REEDUCATION: CPT

## 2025-07-25 PROCEDURE — 85025 COMPLETE CBC W/AUTO DIFF WBC: CPT

## 2025-07-25 PROCEDURE — 6360000002 HC RX W HCPCS: Performed by: STUDENT IN AN ORGANIZED HEALTH CARE EDUCATION/TRAINING PROGRAM

## 2025-07-25 PROCEDURE — 97116 GAIT TRAINING THERAPY: CPT

## 2025-07-25 PROCEDURE — 97535 SELF CARE MNGMENT TRAINING: CPT

## 2025-07-25 PROCEDURE — 80048 BASIC METABOLIC PNL TOTAL CA: CPT

## 2025-07-25 RX ADMIN — METOPROLOL SUCCINATE 12.5 MG: 25 TABLET, EXTENDED RELEASE ORAL at 08:01

## 2025-07-25 RX ADMIN — Medication 3 MG: at 20:47

## 2025-07-25 RX ADMIN — DULOXETINE 60 MG: 60 CAPSULE, DELAYED RELEASE ORAL at 08:01

## 2025-07-25 RX ADMIN — PRAVASTATIN SODIUM 20 MG: 20 TABLET ORAL at 08:01

## 2025-07-25 RX ADMIN — ENOXAPARIN SODIUM 30 MG: 100 INJECTION SUBCUTANEOUS at 20:47

## 2025-07-25 RX ADMIN — FUROSEMIDE 20 MG: 20 TABLET ORAL at 08:01

## 2025-07-25 RX ADMIN — HYDROCODONE BITARTRATE AND ACETAMINOPHEN 1 TABLET: 5; 325 TABLET ORAL at 14:41

## 2025-07-25 RX ADMIN — ACETAMINOPHEN 650 MG: 325 TABLET ORAL at 08:50

## 2025-07-25 RX ADMIN — PANTOPRAZOLE SODIUM 40 MG: 40 TABLET, DELAYED RELEASE ORAL at 07:02

## 2025-07-25 ASSESSMENT — PAIN DESCRIPTION - ORIENTATION
ORIENTATION: LEFT
ORIENTATION: LEFT

## 2025-07-25 ASSESSMENT — PAIN SCALES - GENERAL
PAINLEVEL_OUTOF10: 0
PAINLEVEL_OUTOF10: 5
PAINLEVEL_OUTOF10: 0
PAINLEVEL_OUTOF10: 3

## 2025-07-25 ASSESSMENT — PAIN DESCRIPTION - LOCATION
LOCATION: HIP;LEG
LOCATION: HIP

## 2025-07-25 ASSESSMENT — PAIN - FUNCTIONAL ASSESSMENT
PAIN_FUNCTIONAL_ASSESSMENT: ACTIVITIES ARE NOT PREVENTED
PAIN_FUNCTIONAL_ASSESSMENT: ACTIVITIES ARE NOT PREVENTED

## 2025-07-25 ASSESSMENT — PAIN DESCRIPTION - DESCRIPTORS
DESCRIPTORS: ACHING
DESCRIPTORS: SORE

## 2025-07-25 NOTE — PROGRESS NOTES
Occupational Therapy  Facility/Department: [facility]  Rehabilitation Occupational Therapy Daily Treatment Note    Date: 25  Patient Name: Edna Mcconnell       Room: 0158/0158-01  MRN: [id number]  Account: [id number]   : 1935  (89 y.o.) Gender: female       Past Medical History:  has a past medical history of Allergic rhinitis, Anxiety, Arthritis, Asthma, Back pain, GERD (gastroesophageal reflux disease), Goiter, Hyperlipidemia, Hypertension, MRSA (methicillin resistant staph aureus) culture positive, CARRINGTON (obstructive sleep apnea), Rash, and Thyroid disease.  Past Surgical History:   has a past surgical history that includes Diagnostic Cardiac Cath Lab Procedure; Hysterectomy; Cholecystectomy; Rotator cuff repair; knee surgery; Breast surgery; Wrist surgery; Cataract removal; lumbar fusion (2018); eye surgery; Dilation and curettage of uterus; Total knee arthroplasty (Right, 2021); Total knee arthroplasty (Left, 2021); and Total hip arthroplasty (Left, 7/15/2025).    Restrictions  Restrictions/Precautions: Weight Bearing, Fall Risk, General Precautions  Hip Precautions: Anterior hip precautions  Other Position/Activity Restrictions: JUJU hose, monitor BP  Left Lower Extremity Weight Bearing: Weight Bearing As Tolerated    Subjective  Subjective: Patient supine in bed upon arrival. Agreeable to OT treatment session with no verbalized complaints of pain. Vitals: /61, HR 82, SpO2 96%.  Restrictions/Precautions: Weight Bearing;Fall Risk;General Precautions    Objective  Cognition  Overall Cognitive Status: Exceptions  Arousal/Alertness: Appears intact  Following Commands: Follows multistep commands with increased time;Follows multistep commands with repetition  Attention Span: Attends with cues to redirect  Memory: Decreased short term memory  Safety Judgement: Decreased awareness of need for safety  Problem Solving: Assistance required to generate solutions;Assistance required to

## 2025-07-25 NOTE — PLAN OF CARE
On the basis of positive falls risk screening, assessment and plan is as follows:   Instruction on call light usage  Fall wrist band and yellow socks  Table and commonly used items in arms reach  Purposeful and intentional rounding  Bed alarm/ chair alarm  Communication of fall risk to team members through charting and shift reports.   Monitor labs and vitals for contributing factors:    Lab Results   Component Value Date    WBC 9.6 07/23/2025    HGB 9.8 (L) 07/23/2025    HCT 29.1 (L) 07/23/2025     07/23/2025     07/23/2025    K 3.9 07/23/2025     07/23/2025    CREATININE 1.1 07/23/2025    BUN 16 07/23/2025    CO2 27 07/23/2025    INR 1.11 07/15/2025      Vitals:    07/24/25 0758 07/24/25 0943 07/24/25 1943 07/24/25 2100   BP: 130/60  128/70    Pulse: 91  92    Resp: 17 16 18 16   Temp: 98.2 °F (36.8 °C)  97.7 °F (36.5 °C)    TempSrc: Oral  Oral    SpO2: 98%  99%    Weight:       Height:          Problem: Discharge Planning  Goal: Discharge to home or other facility with appropriate resources  Outcome: Progressing  Flowsheets (Taken 7/24/2025 1943)  Discharge to home or other facility with appropriate resources:   Identify barriers to discharge with patient and caregiver   Arrange for needed discharge resources and transportation as appropriate   Identify discharge learning needs (meds, wound care, etc)   Arrange for interpreters to assist at discharge as needed   Refer to discharge planning if patient needs post-hospital services based on physician order or complex needs related to functional status, cognitive ability or social support system     Problem: Pain  Goal: Verbalizes/displays adequate comfort level or baseline comfort level  Outcome: Progressing     Problem: Skin/Tissue Integrity  Goal: Skin integrity remains intact  Description: 1.  Monitor for areas of redness and/or skin breakdown  2.  Assess vascular access sites hourly  3.  Every 4-6 hours minimum:  Change oxygen saturation probe

## 2025-07-25 NOTE — PLAN OF CARE
Problem: Safety - Adult  Goal: Free from fall injury  7/25/2025 1701 by Lucita Casper RN  Outcome: Progressing  7/25/2025 0645 by Gayatri Kebede RN  Outcome: Progressing     Problem: Pain  Goal: Verbalizes/displays adequate comfort level or baseline comfort level  7/25/2025 1701 by Lucita Casper RN  Outcome: Progressing  7/25/2025 0645 by Gayatri Kebede, RN  Outcome: Progressing

## 2025-07-25 NOTE — PROGRESS NOTES
Physical Therapy  Facility/Department: [facility]  Rehabilitation Physical Therapy Treatment Note    NAME: Edna Mcconnell  : 1935 (89 y.o.)  MRN: [id number]  CODE STATUS: Full Code    Date of Service: 25       Restrictions:  Restrictions/Precautions: Weight Bearing, Fall Risk, General Precautions  Lower Extremity Weight Bearing Restrictions  Left Lower Extremity Weight Bearing: Weight Bearing As Tolerated  Position Activity Restriction  Hip Precautions: Anterior hip precautions  Other Position/Activity Restrictions: JUJU hose, monitor BP     SUBJECTIVE  Subjective: handoff from OT in gym, pt motivated to participate  Pain: no pain during session with ambulation nor with exercise       OBJECTIVE  Cognition  Overall Cognitive Status: Exceptions  Arousal/Alertness: Appears intact  Following Commands: Follows multistep commands with increased time;Follows multistep commands with repetition  Attention Span: Attends with cues to redirect  Memory: Decreased short term memory  Safety Judgement: Decreased awareness of need for safety  Problem Solving: Assistance required to generate solutions;Assistance required to identify errors made;Assistance required to correct errors made  Insights: Appears intact  Initiation: Appears intact  Sequencing: Appears intact  Cognition Comment: pt reports she is unable to recall any of her hip precautions \"no ones told me anything\" reeducated on all hip and WB, pt reported precautions sounded familiar once started talking about them  Orientation  Overall Orientation Status: Impaired  Orientation Level: Oriented to person;Oriented to situation;Disoriented to time;Oriented to place (knew the month, but not the year.)    Functional Mobility  Sit to Stand  Assistance Level: Contact guard assist  Skilled Clinical Factors: to RW from w/c, verbal cues for hand placement  Stand to Sit  Assistance Level: Contact guard assist  Skilled Clinical Factors: verbal cues for hand placement on

## 2025-07-25 NOTE — PROGRESS NOTES
Edna Mcconnell  7/25/2025  [id number]    Chief Complaint: Closed left hip fracture, initial encounter ([facility])    Subjective:   No acute events overnight. Today Alina is seen in her room. She reports feeling well. She denies any confusion overnight. Discussed that UA was negative for infection.     Personally reviewed labs.     ROS: denies f/c, n/v, cp, sob    Objective:  Patient Vitals for the past 24 hrs:   BP Temp Temp src Pulse Resp SpO2   07/25/25 0758 (!) 135/53 -- -- 77 18 96 %   07/25/25 0645 96/77 98.1 °F (36.7 °C) Oral 76 18 96 %   07/24/25 2100 -- -- -- -- 16 --   07/24/25 1943 128/70 97.7 °F (36.5 °C) Oral 92 18 99 %     Gen: No distress, pleasant.   HEENT: Normocephalic, atraumatic.   CV: systolic murmur  Resp: No respiratory distress. Lungs CTAB  Abd: Soft, nondistended  Ext: No edema.   Neuro: Alert, oriented, appropriately interactive. Speech fluent  Psych: appropriate mood and affect    Wt Readings from Last 3 Encounters:   07/23/25 78.7 kg (173 lb 8 oz)   07/16/25 90.4 kg (199 lb 4.7 oz)   02/02/24 81.6 kg (180 lb)       Laboratory data:   Lab Results   Component Value Date    WBC 8.0 07/25/2025    HGB 9.7 (L) 07/25/2025    HCT 28.4 (L) 07/25/2025    MCV 94.1 07/25/2025     07/25/2025       Lab Results   Component Value Date/Time     07/25/2025 06:10 AM    K 3.9 07/25/2025 06:10 AM     07/25/2025 06:10 AM    CO2 26 07/25/2025 06:10 AM    BUN 16 07/25/2025 06:10 AM    CREATININE 1.1 07/25/2025 06:10 AM    GLUCOSE 108 07/25/2025 06:10 AM    CALCIUM 8.6 07/25/2025 06:10 AM        Therapy progress:  Physical therapy:  Bed Mobility:     Sit>supine:  Assistance Level: Contact guard assist  Skilled Clinical Factors: bed flat  Supine>sit:  Assistance Level: Minimal assistance  Skilled Clinical Factors: for LLE  Transfers:  Surface: Raised toilet Seat, To chair with arms, Wheelchair, From chair with arms  Additional Factors: Verbal cues, Increased time to complete  Device:

## 2025-07-26 PROCEDURE — 6360000002 HC RX W HCPCS: Performed by: STUDENT IN AN ORGANIZED HEALTH CARE EDUCATION/TRAINING PROGRAM

## 2025-07-26 PROCEDURE — 1280000000 HC REHAB R&B

## 2025-07-26 PROCEDURE — 6370000000 HC RX 637 (ALT 250 FOR IP): Performed by: STUDENT IN AN ORGANIZED HEALTH CARE EDUCATION/TRAINING PROGRAM

## 2025-07-26 RX ADMIN — Medication 3 MG: at 21:21

## 2025-07-26 RX ADMIN — ACETAMINOPHEN 650 MG: 325 TABLET ORAL at 09:19

## 2025-07-26 RX ADMIN — PANTOPRAZOLE SODIUM 40 MG: 40 TABLET, DELAYED RELEASE ORAL at 09:23

## 2025-07-26 RX ADMIN — HYDROCODONE BITARTRATE AND ACETAMINOPHEN 1 TABLET: 5; 325 TABLET ORAL at 00:17

## 2025-07-26 RX ADMIN — FUROSEMIDE 20 MG: 20 TABLET ORAL at 09:19

## 2025-07-26 RX ADMIN — ENOXAPARIN SODIUM 30 MG: 100 INJECTION SUBCUTANEOUS at 21:20

## 2025-07-26 RX ADMIN — PRAVASTATIN SODIUM 20 MG: 20 TABLET ORAL at 09:19

## 2025-07-26 RX ADMIN — DULOXETINE 60 MG: 60 CAPSULE, DELAYED RELEASE ORAL at 09:19

## 2025-07-26 RX ADMIN — HYDROCODONE BITARTRATE AND ACETAMINOPHEN 1 TABLET: 5; 325 TABLET ORAL at 21:20

## 2025-07-26 ASSESSMENT — PAIN DESCRIPTION - DESCRIPTORS
DESCRIPTORS: ACHING
DESCRIPTORS: SORE

## 2025-07-26 ASSESSMENT — PAIN DESCRIPTION - ORIENTATION
ORIENTATION: LEFT
ORIENTATION: LOWER

## 2025-07-26 ASSESSMENT — PAIN DESCRIPTION - LOCATION
LOCATION: HIP;LEG
LOCATION: BACK
LOCATION: HIP
LOCATION: HIP

## 2025-07-26 ASSESSMENT — PAIN SCALES - WONG BAKER: WONGBAKER_NUMERICALRESPONSE: NO HURT

## 2025-07-26 ASSESSMENT — PAIN - FUNCTIONAL ASSESSMENT: PAIN_FUNCTIONAL_ASSESSMENT: ACTIVITIES ARE NOT PREVENTED

## 2025-07-26 ASSESSMENT — PAIN SCALES - GENERAL
PAINLEVEL_OUTOF10: 8
PAINLEVEL_OUTOF10: 4
PAINLEVEL_OUTOF10: 3

## 2025-07-26 NOTE — PROGRESS NOTES
Edna Mcconnell  7/26/2025  [id number]    Chief Complaint: Closed left hip fracture, initial encounter ([facility])    Subjective:   Seen in her room this morning.  No new complaints overnight.  Patient states she has pain of 5/10 but was able to sleep well last night.  She is able to participate in therapy today.  Patient ate well this morning.    ROS: denies f/c, n/v, cp, sob    Objective:  Patient Vitals for the past 24 hrs:   BP Temp Temp src Pulse Resp SpO2   07/26/25 0914 111/67 97.9 °F (36.6 °C) Oral 90 18 96 %   07/26/25 0047 -- -- -- -- 17 --   07/25/25 2030 (!) 116/51 97.7 °F (36.5 °C) Oral 82 18 97 %     Gen: No distress, pleasant.   HEENT: Normocephalic, atraumatic.   CV: systolic murmur  Resp: No respiratory distress. Lungs CTAB  Abd: Soft, nondistended  Ext: No edema.   Neuro: Alert, oriented, appropriately interactive. Speech fluent  Psych: appropriate mood and affect    Wt Readings from Last 3 Encounters:   07/23/25 78.7 kg (173 lb 8 oz)   07/16/25 90.4 kg (199 lb 4.7 oz)   02/02/24 81.6 kg (180 lb)       Laboratory data:   Lab Results   Component Value Date    WBC 8.0 07/25/2025    HGB 9.7 (L) 07/25/2025    HCT 28.4 (L) 07/25/2025    MCV 94.1 07/25/2025     07/25/2025       Lab Results   Component Value Date/Time     07/25/2025 06:10 AM    K 3.9 07/25/2025 06:10 AM     07/25/2025 06:10 AM    CO2 26 07/25/2025 06:10 AM    BUN 16 07/25/2025 06:10 AM    CREATININE 1.1 07/25/2025 06:10 AM    GLUCOSE 108 07/25/2025 06:10 AM    CALCIUM 8.6 07/25/2025 06:10 AM        Therapy progress:  Physical therapy:  Bed Mobility:     Sit>supine:  Assistance Level: Contact guard assist  Skilled Clinical Factors: bed flat  Supine>sit:  Assistance Level: Minimal assistance  Skilled Clinical Factors: for LLE  Transfers:  Surface: Raised toilet Seat, To chair with arms, Wheelchair, From chair with arms  Additional Factors: Verbal cues, Increased time to complete  Device: Walker  Sit>stand:  Assistance Level:  Contact guard assist  Skilled Clinical Factors: to RW from w/c, verbal cues for hand placement  Stand>sit:  Assistance Level: Contact guard assist  Skilled Clinical Factors: verbal cues for hand placement on armrests to improve eccentric control  Bed<>chair  Technique: Stand pivot  Assistance Level: Contact guard assist  Skilled Clinical Factors: Using RW, increased time/effort, VC to remain within walker frame  Stand Pivot:     Lateral transfer:     Car transfer:     Ambulation:  Surface: Level surface  Device: Rolling walker  Distance: 62ft  Activity: Within Unit, Within Room  Activity Comments: Pt ambulates with slow guillaume, short step length, downward gaze, forward flexed-conditioned to ambulate with 4ww  Additional Factors: Verbal cues, Increased time to complete (verbal cues to keep jim inside frame of walker)  Assistance Level: Contact guard assist  Gait Deviations: Decreased step length bilateral, Slow guillaume, Wide base of support, Decreased weight shift left, Decreased trunk rotation  Skilled Clinical Factors: wheelchair brought behind due to fluctuating activity tolerance, max encouragement for participation in gait training  Stairs:  Stair Height: 6''  Device: Bilateral handrails  Number of Stairs: 2  Additional Factors: Verbal cues, Non-reciprocal going down, Non-reciprocal going up, Hand placement cues  Assistance Level: Contact guard assist  Curb:     Wheelchair:  Surface: Level surface  Device: Standard wheelchair  Assistance Level for Propulsion: Minimal assistance (for obstacle negotiation, kehinde BEAL)  Propulsion Method: Bilateral upper extremities  Propulsion Quality: Slow velocity, Short strokes  Propulsion Distance: 50 ft    Occupational therapy:   Feeding  Assistance Level: Independent  Skilled Clinical Factors: with beverage management.  Grooming/Oral Hygiene  Assistance Level: Stand by assist  Skilled Clinical Factors: to wash hands, brush hair, and perform oral hygiene in stance at the sink

## 2025-07-26 NOTE — PLAN OF CARE
Problem: Pain  Goal: Verbalizes/displays adequate comfort level or baseline comfort level  7/26/2025 0029 by Leah Lemus, RN  Outcome: Progressing  7/25/2025 1701 by Lucita Casper, RN  Outcome: Progressing     Problem: Skin/Tissue Integrity  Goal: Skin integrity remains intact  Description: 1.  Monitor for areas of redness and/or skin breakdown  2.  Assess vascular access sites hourly  3.  Every 4-6 hours minimum:  Change oxygen saturation probe site  4.  Every 4-6 hours:  If on nasal continuous positive airway pressure, respiratory therapy assess nares and determine need for appliance change or resting period  Outcome: Progressing     Problem: Safety - Adult  Goal: Free from fall injury  7/26/2025 0029 by Leah Lemus, RN  Outcome: Progressing  7/25/2025 1701 by Lucita Casper, RN  Outcome: Progressing

## 2025-07-26 NOTE — PLAN OF CARE
Problem: Safety - Adult  Goal: Free from fall injury  Outcome: Progressing  Flowsheets (Taken 7/26/2025 1502)  Free From Fall Injury:   Instruct family/caregiver on patient safety   Based on caregiver fall risk screen, instruct family/caregiver to ask for assistance with transferring infant if caregiver noted to have fall risk factors

## 2025-07-27 VITALS
RESPIRATION RATE: 18 BRPM | TEMPERATURE: 98.1 F | DIASTOLIC BLOOD PRESSURE: 97 MMHG | WEIGHT: 162.92 LBS | OXYGEN SATURATION: 98 % | SYSTOLIC BLOOD PRESSURE: 126 MMHG | BODY MASS INDEX: 28.87 KG/M2 | HEIGHT: 63 IN | HEART RATE: 85 BPM

## 2025-07-27 PROCEDURE — 1280000000 HC REHAB R&B

## 2025-07-27 PROCEDURE — 6370000000 HC RX 637 (ALT 250 FOR IP): Performed by: STUDENT IN AN ORGANIZED HEALTH CARE EDUCATION/TRAINING PROGRAM

## 2025-07-27 PROCEDURE — 6360000002 HC RX W HCPCS: Performed by: STUDENT IN AN ORGANIZED HEALTH CARE EDUCATION/TRAINING PROGRAM

## 2025-07-27 RX ADMIN — Medication 3 MG: at 19:45

## 2025-07-27 RX ADMIN — PRAVASTATIN SODIUM 20 MG: 20 TABLET ORAL at 09:31

## 2025-07-27 RX ADMIN — PANTOPRAZOLE SODIUM 40 MG: 40 TABLET, DELAYED RELEASE ORAL at 04:27

## 2025-07-27 RX ADMIN — METOPROLOL SUCCINATE 12.5 MG: 25 TABLET, EXTENDED RELEASE ORAL at 09:31

## 2025-07-27 RX ADMIN — DULOXETINE 60 MG: 60 CAPSULE, DELAYED RELEASE ORAL at 09:31

## 2025-07-27 RX ADMIN — HYDROCODONE BITARTRATE AND ACETAMINOPHEN 1 TABLET: 5; 325 TABLET ORAL at 04:27

## 2025-07-27 RX ADMIN — ENOXAPARIN SODIUM 30 MG: 100 INJECTION SUBCUTANEOUS at 19:45

## 2025-07-27 RX ADMIN — HYDROCODONE BITARTRATE AND ACETAMINOPHEN 1 TABLET: 5; 325 TABLET ORAL at 19:45

## 2025-07-27 RX ADMIN — FUROSEMIDE 20 MG: 20 TABLET ORAL at 09:31

## 2025-07-27 ASSESSMENT — PAIN DESCRIPTION - DESCRIPTORS
DESCRIPTORS: ACHING
DESCRIPTORS: ACHING;DISCOMFORT

## 2025-07-27 ASSESSMENT — PAIN SCALES - GENERAL
PAINLEVEL_OUTOF10: 0
PAINLEVEL_OUTOF10: 6
PAINLEVEL_OUTOF10: 6

## 2025-07-27 ASSESSMENT — PAIN DESCRIPTION - LOCATION
LOCATION: BACK
LOCATION: HIP

## 2025-07-27 ASSESSMENT — PAIN DESCRIPTION - FREQUENCY: FREQUENCY: INTERMITTENT

## 2025-07-27 ASSESSMENT — PAIN DESCRIPTION - ORIENTATION
ORIENTATION: LOWER
ORIENTATION: LEFT

## 2025-07-27 ASSESSMENT — PAIN - FUNCTIONAL ASSESSMENT: PAIN_FUNCTIONAL_ASSESSMENT: ACTIVITIES ARE NOT PREVENTED

## 2025-07-27 ASSESSMENT — PAIN DESCRIPTION - ONSET: ONSET: GRADUAL

## 2025-07-27 ASSESSMENT — PAIN DESCRIPTION - PAIN TYPE: TYPE: SURGICAL PAIN

## 2025-07-27 NOTE — PLAN OF CARE
Problem: Skin/Tissue Integrity  Goal: Skin integrity remains intact  Description: 1.  Monitor for areas of redness and/or skin breakdown  2.  Assess vascular access sites hourly  3.  Every 4-6 hours minimum:  Change oxygen saturation probe site  4.  Every 4-6 hours:  If on nasal continuous positive airway pressure, respiratory therapy assess nares and determine need for appliance change or resting period  Outcome: Progressing     Problem: Safety - Adult  Goal: Free from fall injury  7/26/2025 2252 by Lisbet Mo, RN  Outcome: Progressing  7/26/2025 1502 by Jillian Hinds, RN  Outcome: Progressing  Flowsheets (Taken 7/26/2025 1502)  Free From Fall Injury:   Instruct family/caregiver on patient safety   Based on caregiver fall risk screen, instruct family/caregiver to ask for assistance with transferring infant if caregiver noted to have fall risk factors     Problem: Pain  Goal: Verbalizes/displays adequate comfort level or baseline comfort level  Outcome: Progressing

## 2025-07-27 NOTE — PROGRESS NOTES
Edna Mcconnell  7/27/2025  [id number]    Chief Complaint: Closed left hip fracture, initial encounter ([facility])    Subjective:   Resting in her room this morning.  No new complaints overnight.  Patient states she does not have any left hip pain.  She is able to participate well in therapy.  Patient was able to sleep well last night.    ROS: denies f/c, n/v, cp, sob    Objective:  Patient Vitals for the past 24 hrs:   BP Temp Temp src Pulse Resp SpO2 Weight   07/27/25 0924 (!) 113/58 97.9 °F (36.6 °C) Oral 86 17 98 % --   07/27/25 0653 -- -- -- -- -- -- 73.9 kg (162 lb 14.7 oz)   07/27/25 0427 -- -- -- -- 16 -- --   07/26/25 2120 -- -- -- -- 16 -- --   07/26/25 2013 (!) 106/94 98.1 °F (36.7 °C) Oral 85 18 97 % --   07/26/25 1739 -- -- -- -- -- -- 73.3 kg (161 lb 9.6 oz)     Gen: No distress, pleasant.   HEENT: Normocephalic, atraumatic.   CV: systolic murmur  Resp: No respiratory distress. Lungs CTAB  Abd: Soft, nondistended  Ext: No edema.   Neuro: Alert, oriented, appropriately interactive. Speech fluent  Psych: appropriate mood and affect    Wt Readings from Last 3 Encounters:   07/27/25 73.9 kg (162 lb 14.7 oz)   07/16/25 90.4 kg (199 lb 4.7 oz)   02/02/24 81.6 kg (180 lb)       Laboratory data:   Lab Results   Component Value Date    WBC 8.0 07/25/2025    HGB 9.7 (L) 07/25/2025    HCT 28.4 (L) 07/25/2025    MCV 94.1 07/25/2025     07/25/2025       Lab Results   Component Value Date/Time     07/25/2025 06:10 AM    K 3.9 07/25/2025 06:10 AM     07/25/2025 06:10 AM    CO2 26 07/25/2025 06:10 AM    BUN 16 07/25/2025 06:10 AM    CREATININE 1.1 07/25/2025 06:10 AM    GLUCOSE 108 07/25/2025 06:10 AM    CALCIUM 8.6 07/25/2025 06:10 AM        Therapy progress:  Physical therapy:  Bed Mobility:     Sit>supine:  Assistance Level: Contact guard assist  Skilled Clinical Factors: bed flat  Supine>sit:  Assistance Level: Minimal assistance  Skilled Clinical Factors: for LLE  Transfers:  Surface: Raised toilet

## 2025-07-28 LAB
ANION GAP SERPL CALCULATED.3IONS-SCNC: 11 MMOL/L (ref 3–16)
BASOPHILS # BLD: 0.1 K/UL (ref 0–0.2)
BASOPHILS NFR BLD: 1 %
BUN SERPL-MCNC: 14 MG/DL (ref 7–20)
CALCIUM SERPL-MCNC: 8.7 MG/DL (ref 8.3–10.6)
CHLORIDE SERPL-SCNC: 101 MMOL/L (ref 99–110)
CO2 SERPL-SCNC: 25 MMOL/L (ref 21–32)
CREAT SERPL-MCNC: 1.1 MG/DL (ref 0.6–1.2)
DEPRECATED RDW RBC AUTO: 14.2 % (ref 12.4–15.4)
EOSINOPHIL # BLD: 0.7 K/UL (ref 0–0.6)
EOSINOPHIL NFR BLD: 7.8 %
GFR SERPLBLD CREATININE-BSD FMLA CKD-EPI: 48 ML/MIN/{1.73_M2}
GLUCOSE SERPL-MCNC: 110 MG/DL (ref 70–99)
HCT VFR BLD AUTO: 30 % (ref 36–48)
HGB BLD-MCNC: 10.1 G/DL (ref 12–16)
LYMPHOCYTES # BLD: 1.6 K/UL (ref 1–5.1)
LYMPHOCYTES NFR BLD: 16.5 %
MCH RBC QN AUTO: 31.8 PG (ref 26–34)
MCHC RBC AUTO-ENTMCNC: 33.8 G/DL (ref 31–36)
MCV RBC AUTO: 93.9 FL (ref 80–100)
MONOCYTES # BLD: 0.6 K/UL (ref 0–1.3)
MONOCYTES NFR BLD: 6.8 %
NEUTROPHILS # BLD: 6.5 K/UL (ref 1.7–7.7)
NEUTROPHILS NFR BLD: 67.9 %
PLATELET # BLD AUTO: 408 K/UL (ref 135–450)
PMV BLD AUTO: 8 FL (ref 5–10.5)
POTASSIUM SERPL-SCNC: 3.6 MMOL/L (ref 3.5–5.1)
RBC # BLD AUTO: 3.19 M/UL (ref 4–5.2)
SODIUM SERPL-SCNC: 137 MMOL/L (ref 136–145)
WBC # BLD AUTO: 9.5 K/UL (ref 4–11)

## 2025-07-28 PROCEDURE — 97530 THERAPEUTIC ACTIVITIES: CPT

## 2025-07-28 PROCEDURE — 85025 COMPLETE CBC W/AUTO DIFF WBC: CPT

## 2025-07-28 PROCEDURE — 6370000000 HC RX 637 (ALT 250 FOR IP): Performed by: STUDENT IN AN ORGANIZED HEALTH CARE EDUCATION/TRAINING PROGRAM

## 2025-07-28 PROCEDURE — 36415 COLL VENOUS BLD VENIPUNCTURE: CPT

## 2025-07-28 PROCEDURE — 97116 GAIT TRAINING THERAPY: CPT

## 2025-07-28 PROCEDURE — 6360000002 HC RX W HCPCS: Performed by: STUDENT IN AN ORGANIZED HEALTH CARE EDUCATION/TRAINING PROGRAM

## 2025-07-28 PROCEDURE — 80048 BASIC METABOLIC PNL TOTAL CA: CPT

## 2025-07-28 PROCEDURE — 97110 THERAPEUTIC EXERCISES: CPT

## 2025-07-28 PROCEDURE — 1280000000 HC REHAB R&B

## 2025-07-28 RX ADMIN — FUROSEMIDE 20 MG: 20 TABLET ORAL at 08:18

## 2025-07-28 RX ADMIN — METOPROLOL SUCCINATE 12.5 MG: 25 TABLET, EXTENDED RELEASE ORAL at 08:18

## 2025-07-28 RX ADMIN — DICLOFENAC SODIUM 2 G: 10 GEL TOPICAL at 11:57

## 2025-07-28 RX ADMIN — DICLOFENAC SODIUM 2 G: 10 GEL TOPICAL at 19:51

## 2025-07-28 RX ADMIN — PRAVASTATIN SODIUM 20 MG: 20 TABLET ORAL at 08:18

## 2025-07-28 RX ADMIN — DULOXETINE 60 MG: 60 CAPSULE, DELAYED RELEASE ORAL at 08:18

## 2025-07-28 RX ADMIN — ENOXAPARIN SODIUM 30 MG: 100 INJECTION SUBCUTANEOUS at 19:51

## 2025-07-28 RX ADMIN — PANTOPRAZOLE SODIUM 40 MG: 40 TABLET, DELAYED RELEASE ORAL at 04:16

## 2025-07-28 RX ADMIN — Medication 3 MG: at 19:51

## 2025-07-28 RX ADMIN — HYDROCODONE BITARTRATE AND ACETAMINOPHEN 1 TABLET: 5; 325 TABLET ORAL at 04:16

## 2025-07-28 ASSESSMENT — PAIN DESCRIPTION - LOCATION: LOCATION: SHOULDER

## 2025-07-28 ASSESSMENT — PAIN SCALES - GENERAL
PAINLEVEL_OUTOF10: 6
PAINLEVEL_OUTOF10: 0
PAINLEVEL_OUTOF10: 3

## 2025-07-28 ASSESSMENT — PAIN DESCRIPTION - ORIENTATION: ORIENTATION: LEFT

## 2025-07-28 ASSESSMENT — PAIN DESCRIPTION - DESCRIPTORS: DESCRIPTORS: ACHING

## 2025-07-28 ASSESSMENT — PAIN SCALES - WONG BAKER: WONGBAKER_NUMERICALRESPONSE: NO HURT

## 2025-07-28 NOTE — PROGRESS NOTES
in a bean bag tossing activity with a cognitive component where she had to write a foodl the color of that bean bag. Patient performed x 2 trials, stood for 4 minutes and 40 seconds, 3 minutes and 20 seconds, and required stand by assistance to maintain dynamic standing balance.     Assessment  Assessment  Assessment: First Session: Patient agreeable to OT treatment session. Patient required stand by assistance with functional transfer performance and stand by assistance for functional mobility during the session. Patient participated in therpaeutic activities to address balance and standing tolerance needed to perform functional activities with increased safety and independence. Patient with standing times of 2 minutes and 25 seconds, 3 minutes and 20 seconds, 4 minutes and 40 seconds, 4 minutes and 50 seconds, and 7 minutes with no overt LOB noted. Patient noted with improved activity tolerance on this date requiring fewer rest breaks. Continue POC. Second Session: Patient sitting in recliner upon arrival. Agreeable to OT treatment session. Patient required stand by assistance with functional transfer performance and stand by assistance with functional mobility during the session. Patient performed UE exercises with the use of a 2 pound dumbbell x 15 reps for wrist flexion/extension, forearm pronation/supination, elbow flexion/extension, scapular protraction, and shoulder flexion to address strength needed to perform transfers and UB ADLs with increased safety and independence. Patient participated in an item retrieval activity to simulate grabbing items around the home. Patient tolerated being on her feet for 4 minutes and 5 seconds and required stand by assistance to maintain standing balance. Patient left in recliner with alarm set and all needs within reach upon returning to room. Continue POC.  Activity Tolerance: Patient tolerated treatment well  Discharge Recommendations: 24 hour supervision or assist;Home  with Home health OT;S Level 1  OT Equipment Recommendations  Equipment Needed: Yes  Other: shower chair with back  Safety Devices  Safety Devices in place: Yes  Type of devices: All fall risk precautions in place;Call light within reach;Chair alarm in place;Left in chair;Nurse notified  Restraints  Initially in place: No    Patient Education  Education  Education Given To: Patient  Education Provided: Role of Therapy;Plan of Care;Safety;Mobility Training;Cognition;DME/Home Modifications;Transfer Training;ADL Function;Home Exercise Program;Fall Prevention Strategies;Energy Conservation;Precautions  Education Provided Comments: home set-up for reducted falls, DME for ADLs, transfer training, BUE HEP, hip px  Education Method: Verbal;Demonstration  Barriers to Learning: Cognition  Education Outcome: Demonstrated understanding;Verbalized understanding;Continued education needed    Plan  Occupational Therapy Plan  Times Per Week: 5-7x/week  Current Treatment Recommendations: Balance training;Functional mobility training;Endurance training;Strengthening;Self-Care / ADL;Safety education & training;Equipment evaluation, education, & procurement;Patient/Caregiver education & training;ROM    Goals  Patient Goals   Patient goals : \"to get back home and do the things that I was doing before\"  Short Term Goals  Time Frame for Short Term Goals: 7/23/25  Short Term Goal 1: Patient will perform hygiene and grooming tasks in stance at the sink with CGA. GOAL MET 7/23  Short Term Goal 2: Patient will perform LB dressing tasks with CGA. GOAL MET 7/22  Short Term Goal 3: Patient will perform footwear management with the use of LRAD with CGA. GOAL MET 7/23  Short Term Goal 4: Patient will perform toileting tasks with CGA. GOAL MET 7/21  Short Term Goal 5: Patient will perform toilet transfers with CGA. GOAL MET 7/21  Long Term Goals  Time Frame for Long Term Goals : 7/31/25 - extend to 8/1 due to LOS  Long Term Goal 1: Patient will

## 2025-07-28 NOTE — CARE COORDINATION
CM update: patient lives with  in a ranch-style house w/ 2STE. Patient has strong support from daughters who are both RN's. Discharge home is planned for Saturday 8/2 w/ a recommendation of HHC. Patient has used [facility] in the past. Daughter Antonietta has attended therapy sessions. Daughter Yudith has family training scheduled for Friday 8/1 from 12:30-1:30. Confirmed with saman Zurita that I will call to update with progress after care conference meeting tomorrow- will confirm HHC preference at that time. Will also plan to meet w/ daughter after family training. Patient owns the recommended RW but does not own a manual w/c- will continue to follow DME recs and provide at discharge as appropriate. COA referral has been made.     Suzanne Miner RN

## 2025-07-28 NOTE — PROGRESS NOTES
Physical Therapy  Facility/Department: [facility]  Rehabilitation Physical Therapy Treatment Note    NAME: Edna Mcconnell  : 1935 (89 y.o.)  MRN: [id number]  CODE STATUS: Full Code    Date of Service: 25       Restrictions:  Restrictions/Precautions: Weight Bearing, Fall Risk, General Precautions  Lower Extremity Weight Bearing Restrictions  Left Lower Extremity Weight Bearing: Weight Bearing As Tolerated  Position Activity Restriction  Hip Precautions: Anterior hip precautions  Other Position/Activity Restrictions: JUJU hose, monitor BP     SUBJECTIVE  Subjective: Patient agreeable to PT session  Pain: 0/10 at rest, 3/10 with activity L hip       OBJECTIVE  Orientation  Overall Orientation Status: Impaired  Orientation Level: Oriented to person;Oriented to situation;Disoriented to time;Oriented to place    Functional Mobility  Balance  Sitting Balance: Independent  Standing Balance: Stand by assistance  Standing Balance  Time: 1-2 minutes  Activity: standing at bathroom sink for handwashing/grooming VC for upright posture throughout  Transfers  Surface: Standard toilet;From chair with arms;Wheelchair  Additional Factors: Verbal cues;Increased time to complete  Device: Walker  Sit to Stand  Assistance Level: Contact guard assist;Minimal assistance  Skilled Clinical Factors: to RW from w/c, verbal cues for hand placement  Stand to Sit  Assistance Level: Contact guard assist  Skilled Clinical Factors: verbal cues for hand placement on armrests to improve eccentric control  Car Transfer  Assistance Level: Contact guard assist  Skilled Clinical Factors: VC for safe use of RW      Environmental Mobility  Ambulation  Surface: Level surface  Device: Rolling walker  Distance: 10' + 10' + 58' + 35' + 35'  Activity: Within Unit;Within Room  Activity Comments: Pt ambulates with slow guillaume, short step length, downward gaze, forward flexed-conditioned to ambulate with 4ww  Additional Factors: Verbal cues;Increased

## 2025-07-28 NOTE — PLAN OF CARE
Problem: Pain  Goal: Verbalizes/displays adequate comfort level or baseline comfort level  7/28/2025 0939 by Lorenzo Lao, RN  Outcome: Progressing  7/28/2025 0114 by Lisbet Mo, RN  Outcome: Progressing

## 2025-07-28 NOTE — PROGRESS NOTES
Edna Mcconnell  7/28/2025  [id number]    Chief Complaint: Closed left hip fracture, initial encounter ([facility])    Subjective:   No acute events overnight. Today Alina is seen in her room without family present. She reports difficulty sleeping last night. She notes bilateral shoulder pain. Discussed trying voltaren.     Personally reviewed labs.     ROS: denies f/c, n/v, cp, sob    Objective:  Patient Vitals for the past 24 hrs:   BP Temp Temp src Pulse Resp SpO2 Weight   07/28/25 0812 114/62 97.5 °F (36.4 °C) Oral 94 18 97 % --   07/28/25 0416 -- -- -- -- 16 -- --   07/28/25 0409 -- -- -- -- -- -- 74 kg (163 lb 3.2 oz)   07/27/25 2015 -- -- -- -- 18 -- --   07/27/25 1934 (!) 126/97 98.1 °F (36.7 °C) Oral 85 18 98 % --     Gen: No distress, pleasant.   HEENT: Normocephalic, atraumatic.   CV: extremities well perfused  Resp: No respiratory distress. On room air  Abd: Soft, nondistended  Ext: No edema.   Neuro: Alert, oriented, appropriately interactive. Speech fluent  Psych: appropriate mood and affect    Wt Readings from Last 3 Encounters:   07/28/25 74 kg (163 lb 3.2 oz)   07/16/25 90.4 kg (199 lb 4.7 oz)   02/02/24 81.6 kg (180 lb)       Laboratory data:   Lab Results   Component Value Date    WBC 9.5 07/28/2025    HGB 10.1 (L) 07/28/2025    HCT 30.0 (L) 07/28/2025    MCV 93.9 07/28/2025     07/28/2025       Lab Results   Component Value Date/Time     07/28/2025 05:58 AM    K 3.6 07/28/2025 05:58 AM     07/28/2025 05:58 AM    CO2 25 07/28/2025 05:58 AM    BUN 14 07/28/2025 05:58 AM    CREATININE 1.1 07/28/2025 05:58 AM    GLUCOSE 110 07/28/2025 05:58 AM    CALCIUM 8.7 07/28/2025 05:58 AM        Therapy progress:  Physical therapy:  Bed Mobility:     Sit>supine:  Assistance Level: Contact guard assist  Skilled Clinical Factors: bed flat  Supine>sit:  Assistance Level: Minimal assistance  Skilled Clinical Factors: for LLE  Transfers:  Surface: Raised toilet Seat, To chair with arms, Wheelchair,

## 2025-07-28 NOTE — PATIENT CARE CONFERENCE
[facility]  Inpatient Rehabilitation  Weekly Team Conference Note    Date: 2025  Patient Name: Edna Mcconnell        MRN: [id number]    : 1935  (89 y.o.)  Gender: female   Referring Practitioner: Dr. Bae  Diagnosis: L SADIQ      Interventions to be utilized toward barriers to discharge, per discipline:  NURSING  Nursing observed barriers to dc: Pain and Wound Care  Nursing interventions: help patient with ADL, Help patient with medications  Family Education: Yes  Fall Risk:  Yes    Stay with me?: No    PHYSICAL THERAPY  Physical therapy observed barriers to dc:    Baseline: independent with SPC most of time, RW intermittently   Current level: Antonella bed mobility, CGA transfers, CGA gait with RW 14-35ft, CGA 2 stairs with B HR   Barriers to DC: functional activity tolerance, BP, LE strength, balance, safety awareness   Needs in order to achieve dc home/next level of care: 24-7 supervision,  PT; DME: manual wheelchair vs transport chair pending progress      Physical therapy interventions:   Current Treatment Recommendations: Strengthening, Balance training, ROM, Functional mobility training, Transfer training, Endurance training, Gait training, Stair training, Neuromuscular re-education, Wheelchair mobility training, Pain management, Safety education & training, Home exercise program, Patient/Caregiver education & training, Therapeutic activities    PT Goals:            Short Term Goals  Time Frame for Short Term Goals: 8 days ()  Short Term Goal 1: Pt will perform supine > sit with min A, bed flat  Short Term Goal 2: Pt will perform STS with LRAD and min A - MET 25  Short Term Goal 3: Pt will perform SPT with LRAD and min A - MET 25  Short Term Goal 4: Pt will amb 20 ft with LRAD and mod A - MET 25  Short Term Goal 5: Pt will perform car transfer with min A            Long Term Goals  Time Frame for Long Term Goals : 15 days ()  Long Term Goal 1: Pt 
noted with improved activity tolerance on this date requiring fewer rest breaks. Continue POC. Second Session: Patient sitting in recliner upon arrival. Agreeable to OT treatment session. Patient required stand by assistance with functional transfer performance and stand by assistance with functional mobility during the session. Patient performed UE exercises with the use of a 2 pound dumbbell x 15 reps for wrist flexion/extension, forearm pronation/supination, elbow flexion/extension, scapular protraction, and shoulder flexion to address strength needed to perform transfers and UB ADLs with increased safety and independence. Patient participated in an item retrieval activity to simulate grabbing items around the home. Patient tolerated being on her feet for 4 minutes and 5 seconds and required stand by assistance to maintain standing balance. Patient left in recliner with alarm set and all needs within reach upon returning to room. Continue POC.      SPEECH THERAPY (intentionally left blank if not actively being seen by this service):      NUTRITION  Weight - Scale: 76.6 kg (168 lb 14.4 oz) / Body mass index is 29.92 kg/m².  Diet Order: ADULT DIET; Regular  PO Meals Eaten (%): 51 - 75%  Malnutrition Status: At risk for malnutrition  Nutrition Education/Counseling: No recommendation at this time      CASE MANAGEMENT  Assessment: patient lives with  in a ranch-style house w/ 2STE. Patient has strong support from daughters who are both RN's. Discharge home is planned for Saturday 8/2 w/ a recommendation of [facility]. Patient has used [facility] in the past. Daughter Antonietta and Yudith have attended therapy sessions. Daughter Yudith and  have family training scheduled for Friday 8/1 from 12:30-1:30- will plan to meet w/ after. Will continue to follow DME recs and provide at discharge as appropriate. COA referral made.    Interdisciplinary Goals:   1.) Patient will perform toilet transfers with Nery.  2.) Patient will

## 2025-07-29 PROCEDURE — 97110 THERAPEUTIC EXERCISES: CPT

## 2025-07-29 PROCEDURE — 6360000002 HC RX W HCPCS: Performed by: STUDENT IN AN ORGANIZED HEALTH CARE EDUCATION/TRAINING PROGRAM

## 2025-07-29 PROCEDURE — 97530 THERAPEUTIC ACTIVITIES: CPT

## 2025-07-29 PROCEDURE — 97535 SELF CARE MNGMENT TRAINING: CPT

## 2025-07-29 PROCEDURE — 97116 GAIT TRAINING THERAPY: CPT

## 2025-07-29 PROCEDURE — 1280000000 HC REHAB R&B

## 2025-07-29 PROCEDURE — 6370000000 HC RX 637 (ALT 250 FOR IP): Performed by: STUDENT IN AN ORGANIZED HEALTH CARE EDUCATION/TRAINING PROGRAM

## 2025-07-29 RX ORDER — ACETAMINOPHEN 325 MG/1
650 TABLET ORAL EVERY 4 HOURS PRN
Status: DISCONTINUED | OUTPATIENT
Start: 2025-07-29 | End: 2025-08-02 | Stop reason: HOSPADM

## 2025-07-29 RX ADMIN — ENOXAPARIN SODIUM 30 MG: 100 INJECTION SUBCUTANEOUS at 20:16

## 2025-07-29 RX ADMIN — METOPROLOL SUCCINATE 12.5 MG: 25 TABLET, EXTENDED RELEASE ORAL at 08:43

## 2025-07-29 RX ADMIN — PANTOPRAZOLE SODIUM 40 MG: 40 TABLET, DELAYED RELEASE ORAL at 05:44

## 2025-07-29 RX ADMIN — Medication 3 MG: at 20:16

## 2025-07-29 RX ADMIN — FUROSEMIDE 20 MG: 20 TABLET ORAL at 08:43

## 2025-07-29 RX ADMIN — PRAVASTATIN SODIUM 20 MG: 20 TABLET ORAL at 08:43

## 2025-07-29 RX ADMIN — DICLOFENAC SODIUM 2 G: 10 GEL TOPICAL at 08:43

## 2025-07-29 RX ADMIN — DULOXETINE 60 MG: 60 CAPSULE, DELAYED RELEASE ORAL at 08:44

## 2025-07-29 RX ADMIN — DICLOFENAC SODIUM 2 G: 10 GEL TOPICAL at 20:16

## 2025-07-29 ASSESSMENT — PAIN SCALES - GENERAL
PAINLEVEL_OUTOF10: 0
PAINLEVEL_OUTOF10: 0

## 2025-07-29 ASSESSMENT — PAIN SCALES - WONG BAKER: WONGBAKER_NUMERICALRESPONSE: NO HURT

## 2025-07-29 NOTE — PROGRESS NOTES
Physical Therapy  Facility/Department: [facility]  Rehabilitation Physical Therapy Treatment Note    NAME: Edna Mcconnell  : 1935 (89 y.o.)  MRN: [id number]  CODE STATUS: Full Code    Date of Service: 25       Restrictions:  Restrictions/Precautions: Weight Bearing, Fall Risk, General Precautions  Lower Extremity Weight Bearing Restrictions  Left Lower Extremity Weight Bearing: Weight Bearing As Tolerated  Position Activity Restriction  Hip Precautions: Anterior hip precautions  Other Position/Activity Restrictions: JUJU hose, monitor BP     SUBJECTIVE  Subjective: Patient agreeable to PT session  Pain: 0/10 at rest, reports initial soreness in L hip upon sit to stand       OBJECTIVE  Cognition  Overall Cognitive Status: Exceptions  Arousal/Alertness: Appears intact  Following Commands: Follows multistep commands with increased time;Follows multistep commands with repetition  Attention Span: Attends with cues to redirect  Memory: Decreased short term memory  Safety Judgement: Decreased awareness of need for safety  Problem Solving: Assistance required to generate solutions;Assistance required to identify errors made;Assistance required to correct errors made  Insights: Decreased awareness of deficits  Initiation: Appears intact  Sequencing: Appears intact  Orientation  Overall Orientation Status: Impaired  Orientation Level: Oriented to person;Oriented to situation;Disoriented to time;Oriented to place    Functional Mobility  Balance  Sitting Balance: Independent  Standing Balance: Stand by assistance  Standing Balance  Time: 5 minutes  Activity: standing at bathroom sink for handwashing/grooming VC for upright posture throughout  Transfers  Surface: Standard toilet;From chair with arms;Wheelchair (rocker lounge chair in hallway)  Additional Factors: Verbal cues;Increased time to complete  Device: Walker  Sit to Stand  Assistance Level: Stand by assist  Skilled Clinical Factors: to RW from w/c, verbal

## 2025-07-29 NOTE — CARE COORDINATION
Weekly team care conference with interdisciplinary team on: 7/29/2025    Chart reviewed for length of stay. IP day # 12  Unit: ARU   Diagnosis and current status as per MD progress: Closed left hip fracture  Consultants Following: Physical Medicine  Planned Discharge Date: 8/2/25  Durable medical equipment needed: RW, shower chair w/ back   Discharge Plan: Home w/ HHC    CM update: Spoke with patient in room and saman Zurita over the phone to discuss discharge planning. Reinforced to patient that discharge is planned for Saturday 8/2 with a recommendation for discharge home w/ HHC. Patient confirms that she plans to discharge home and is not agreeable to discharge to SNF (daughter Yudith continues to inquire about SNF as a discharge option). Both daughters have continued to participate in therapy sessions. Family training with  saman Zurita is planned for Friday 8/1 from 12:30-1:30- will plan to meet with patient and family after. Patient confirms that she has no [facility] agency preference. Daughter Yudith states she would like to speak with patient's other daughter to confirm [facility] agency of choice and will call back to notify.  Discussed recommended DME. Daughter Yudith reports that patient does not have the RW as originally thought- assured patient and daughter that I will [facility] to [facility] a RW at discharge. Patient and daughter understand that a shower chair w/ back is also recommended but not covered by insurance. Daughter confirms she will plan to obtain. Options discussed of where to obtain/purchase. Patient and daughter express no further questions at this time. Will continue to follow.    Suzanne Miner RN

## 2025-07-29 NOTE — PROGRESS NOTES
Edna Mcconnell  7/29/2025  [id number]    Chief Complaint: Closed left hip fracture, initial encounter ([facility])    Subjective:   No acute events overnight. Today Alina is seen in her room. She reports that the voltaren gel helped the pain in her shoulders. She reports that therapy is going well and that she is making progress.     No new labs.     ROS: denies f/c, n/v, cp, sob    Objective:  Patient Vitals for the past 24 hrs:   BP Temp Temp src Pulse Resp SpO2 Weight   07/29/25 0841 (!) 127/57 98 °F (36.7 °C) Oral 85 18 98 % --   07/29/25 0543 -- -- -- -- -- -- 76.6 kg (168 lb 14.4 oz)   07/28/25 1945 115/80 99.3 °F (37.4 °C) Oral 78 18 98 % --     Gen: No distress, pleasant. Seated up in chair  HEENT: Normocephalic, atraumatic.   CV: extremities well perfused  Resp: No respiratory distress. On room air  Abd: Soft, nondistended  Ext: No edema.   Neuro: Alert, oriented, appropriately interactive. Speech fluent  Psych: appropriate mood and affect    Wt Readings from Last 3 Encounters:   07/29/25 76.6 kg (168 lb 14.4 oz)   07/16/25 90.4 kg (199 lb 4.7 oz)   02/02/24 81.6 kg (180 lb)       Laboratory data:   Lab Results   Component Value Date    WBC 9.5 07/28/2025    HGB 10.1 (L) 07/28/2025    HCT 30.0 (L) 07/28/2025    MCV 93.9 07/28/2025     07/28/2025       Lab Results   Component Value Date/Time     07/28/2025 05:58 AM    K 3.6 07/28/2025 05:58 AM     07/28/2025 05:58 AM    CO2 25 07/28/2025 05:58 AM    BUN 14 07/28/2025 05:58 AM    CREATININE 1.1 07/28/2025 05:58 AM    GLUCOSE 110 07/28/2025 05:58 AM    CALCIUM 8.7 07/28/2025 05:58 AM        Therapy progress:  Physical therapy:  Bed Mobility:     Sit>supine:  Assistance Level: Contact guard assist  Skilled Clinical Factors: bed flat  Supine>sit:  Assistance Level: Minimal assistance  Skilled Clinical Factors: for LLE  Transfers:  Surface: Standard toilet, From chair with arms, Wheelchair (rocker lounge chair in hallway)  Additional Factors:

## 2025-07-29 NOTE — PROGRESS NOTES
for Fluid Requirements: 1 ml/kcal  Fluid (ml/day): 1580 - 1738    Nutrition Diagnosis:   Increased nutrient needs related to acute injury/trauma as evidenced by s/p surgery, rehab for strength and conditioning    Nutrition Interventions:   Food and/or Nutrient Delivery: Continue Current Diet  Nutrition Education/Counseling: No recommendation at this time, Education/Counseling declined  Coordination of Nutrition Care: Continue to monitor while inpatient, Interdisciplinary Rounds       Goals:  Goals: PO intake 50% or greater, prior to discharge  Type of Goal: Continue current goal  Previous Goal Met: Progressing toward Goal(s)    Nutrition Monitoring and Evaluation:   Behavioral-Environmental Outcomes: None Identified  Food/Nutrient Intake Outcomes: Food and Nutrient Intake  Physical Signs/Symptoms Outcomes: Biochemical Data, Meal Time Behavior, Weight    Discharge Planning:    Continue current diet     Jeanie Shelton, MS, RD, LD  Contact: [phone]

## 2025-07-29 NOTE — PROGRESS NOTES
reducted falls, DME for ADLs, transfer training, BUE HEP, hip px  Education Method: Verbal;Demonstration  Barriers to Learning: Cognition  Education Outcome: Demonstrated understanding;Verbalized understanding;Continued education needed    Plan  Occupational Therapy Plan  Times Per Week: 5-7x/week  Current Treatment Recommendations: Balance training;Functional mobility training;Endurance training;Strengthening;Self-Care / ADL;Safety education & training;Equipment evaluation, education, & procurement;Patient/Caregiver education & training;ROM    Goals  Patient Goals   Patient goals : \"to get back home and do the things that I was doing before\"  Short Term Goals  Time Frame for Short Term Goals: 7/23/25  Short Term Goal 1: Patient will perform hygiene and grooming tasks in stance at the sink with CGA. GOAL MET 7/23  Short Term Goal 2: Patient will perform LB dressing tasks with CGA. GOAL MET 7/22  Short Term Goal 3: Patient will perform footwear management with the use of LRAD with CGA. GOAL MET 7/23  Short Term Goal 4: Patient will perform toileting tasks with CGA. GOAL MET 7/21  Short Term Goal 5: Patient will perform toilet transfers with CGA. GOAL MET 7/21  Long Term Goals  Time Frame for Long Term Goals : 7/31/25 - extend to 8/1 due to LOS  Long Term Goal 1: Patient will perform FB dressing tasks with Nery.  Long Term Goal 2: Patient will perform FB bathing with Nery.  Long Term Goal 3: Patient will perform toileting tasks with Nery.  Long Term Goal 4: Patient will perform toilet transfers with Nery.  Long Term Goal 5: Patient will perform a simple IADL task with SPV. GOAL MET 7/29      Therapy Time   Individual Concurrent Group Co-treatment   Time In 1000         Time Out 1100         Minutes 60         Timed Code Treatment Minutes: 60 Minutes    Second Session Therapy Time:   Individual Concurrent Group Co-treatment   Time In 1230         Time Out 1300         Minutes 30           Timed Code Treatment Minutes:   30 Minutes    Total Treatment Minutes: 90 Minutes     Yoel Murphy, OT

## 2025-07-29 NOTE — PLAN OF CARE
Problem: Pain  Goal: Verbalizes/displays adequate comfort level or baseline comfort level  7/28/2025 2310 by Anastasia Fonseca, RN  Outcome: Progressing  Flowsheets (Taken 7/28/2025 2310)  Verbalizes/displays adequate comfort level or baseline comfort level:   Encourage patient to monitor pain and request assistance   Assess pain using appropriate pain scale   Administer analgesics based on type and severity of pain and evaluate response   Implement non-pharmacological measures as appropriate and evaluate response   Consider cultural and social influences on pain and pain management   Notify Licensed Independent Practitioner if interventions unsuccessful or patient reports new pain

## 2025-07-30 LAB
ANION GAP SERPL CALCULATED.3IONS-SCNC: 11 MMOL/L (ref 3–16)
BASOPHILS # BLD: 0.1 K/UL (ref 0–0.2)
BASOPHILS NFR BLD: 1.1 %
BUN SERPL-MCNC: 14 MG/DL (ref 7–20)
CALCIUM SERPL-MCNC: 8.8 MG/DL (ref 8.3–10.6)
CHLORIDE SERPL-SCNC: 103 MMOL/L (ref 99–110)
CO2 SERPL-SCNC: 25 MMOL/L (ref 21–32)
CREAT SERPL-MCNC: 1.1 MG/DL (ref 0.6–1.2)
DEPRECATED RDW RBC AUTO: 14.4 % (ref 12.4–15.4)
EOSINOPHIL # BLD: 0.7 K/UL (ref 0–0.6)
EOSINOPHIL NFR BLD: 8.4 %
GFR SERPLBLD CREATININE-BSD FMLA CKD-EPI: 48 ML/MIN/{1.73_M2}
GLUCOSE SERPL-MCNC: 107 MG/DL (ref 70–99)
HCT VFR BLD AUTO: 30.8 % (ref 36–48)
HGB BLD-MCNC: 10.3 G/DL (ref 12–16)
LYMPHOCYTES # BLD: 2.2 K/UL (ref 1–5.1)
LYMPHOCYTES NFR BLD: 26.6 %
MAGNESIUM SERPL-MCNC: 2.01 MG/DL (ref 1.8–2.4)
MCH RBC QN AUTO: 31.8 PG (ref 26–34)
MCHC RBC AUTO-ENTMCNC: 33.5 G/DL (ref 31–36)
MCV RBC AUTO: 95 FL (ref 80–100)
MONOCYTES # BLD: 0.7 K/UL (ref 0–1.3)
MONOCYTES NFR BLD: 8.4 %
NEUTROPHILS # BLD: 4.6 K/UL (ref 1.7–7.7)
NEUTROPHILS NFR BLD: 55.5 %
PLATELET # BLD AUTO: 410 K/UL (ref 135–450)
PMV BLD AUTO: 8.1 FL (ref 5–10.5)
POTASSIUM SERPL-SCNC: 3.5 MMOL/L (ref 3.5–5.1)
RBC # BLD AUTO: 3.25 M/UL (ref 4–5.2)
SODIUM SERPL-SCNC: 139 MMOL/L (ref 136–145)
WBC # BLD AUTO: 8.3 K/UL (ref 4–11)

## 2025-07-30 PROCEDURE — 6360000002 HC RX W HCPCS: Performed by: STUDENT IN AN ORGANIZED HEALTH CARE EDUCATION/TRAINING PROGRAM

## 2025-07-30 PROCEDURE — 6370000000 HC RX 637 (ALT 250 FOR IP): Performed by: STUDENT IN AN ORGANIZED HEALTH CARE EDUCATION/TRAINING PROGRAM

## 2025-07-30 PROCEDURE — 85025 COMPLETE CBC W/AUTO DIFF WBC: CPT

## 2025-07-30 PROCEDURE — 97116 GAIT TRAINING THERAPY: CPT

## 2025-07-30 PROCEDURE — 97110 THERAPEUTIC EXERCISES: CPT

## 2025-07-30 PROCEDURE — 97530 THERAPEUTIC ACTIVITIES: CPT

## 2025-07-30 PROCEDURE — 1280000000 HC REHAB R&B

## 2025-07-30 PROCEDURE — 83735 ASSAY OF MAGNESIUM: CPT

## 2025-07-30 PROCEDURE — 36415 COLL VENOUS BLD VENIPUNCTURE: CPT

## 2025-07-30 PROCEDURE — 80048 BASIC METABOLIC PNL TOTAL CA: CPT

## 2025-07-30 RX ORDER — ENOXAPARIN SODIUM 100 MG/ML
40 INJECTION SUBCUTANEOUS NIGHTLY
Status: DISCONTINUED | OUTPATIENT
Start: 2025-07-30 | End: 2025-08-02 | Stop reason: HOSPADM

## 2025-07-30 RX ADMIN — DULOXETINE 60 MG: 60 CAPSULE, DELAYED RELEASE ORAL at 07:52

## 2025-07-30 RX ADMIN — PRAVASTATIN SODIUM 20 MG: 20 TABLET ORAL at 07:52

## 2025-07-30 RX ADMIN — DICLOFENAC SODIUM 2 G: 10 GEL TOPICAL at 07:50

## 2025-07-30 RX ADMIN — ENOXAPARIN SODIUM 40 MG: 100 INJECTION SUBCUTANEOUS at 20:22

## 2025-07-30 RX ADMIN — DICLOFENAC SODIUM 2 G: 10 GEL TOPICAL at 20:25

## 2025-07-30 RX ADMIN — METOPROLOL SUCCINATE 12.5 MG: 25 TABLET, EXTENDED RELEASE ORAL at 07:52

## 2025-07-30 RX ADMIN — Medication 3 MG: at 20:22

## 2025-07-30 RX ADMIN — HYDROCODONE BITARTRATE AND ACETAMINOPHEN 1 TABLET: 5; 325 TABLET ORAL at 20:22

## 2025-07-30 RX ADMIN — FUROSEMIDE 20 MG: 20 TABLET ORAL at 07:52

## 2025-07-30 RX ADMIN — PANTOPRAZOLE SODIUM 40 MG: 40 TABLET, DELAYED RELEASE ORAL at 05:36

## 2025-07-30 ASSESSMENT — PAIN - FUNCTIONAL ASSESSMENT: PAIN_FUNCTIONAL_ASSESSMENT: PREVENTS OR INTERFERES SOME ACTIVE ACTIVITIES AND ADLS

## 2025-07-30 ASSESSMENT — PAIN DESCRIPTION - PAIN TYPE: TYPE: ACUTE PAIN;SURGICAL PAIN

## 2025-07-30 ASSESSMENT — PAIN SCALES - GENERAL
PAINLEVEL_OUTOF10: 5
PAINLEVEL_OUTOF10: 4

## 2025-07-30 ASSESSMENT — PAIN DESCRIPTION - FREQUENCY: FREQUENCY: INTERMITTENT

## 2025-07-30 ASSESSMENT — PAIN DESCRIPTION - ORIENTATION: ORIENTATION: LEFT

## 2025-07-30 ASSESSMENT — PAIN DESCRIPTION - DESCRIPTORS: DESCRIPTORS: ACHING;DISCOMFORT;SORE

## 2025-07-30 ASSESSMENT — PAIN DESCRIPTION - ONSET: ONSET: GRADUAL

## 2025-07-30 ASSESSMENT — PAIN DESCRIPTION - LOCATION: LOCATION: HIP

## 2025-07-30 NOTE — PLAN OF CARE
Problem: Safety - Adult  Goal: Free from fall injury  7/29/2025 2237 by Anastasia Fonseca, RN  Outcome: Progressing

## 2025-07-30 NOTE — PROGRESS NOTES
Occupational Therapy  Facility/Department: [facility]  Rehabilitation Occupational Therapy Daily Treatment Note    Date: 25  Patient Name: Edna Mcconnell       Room: 0158/0158-01  MRN: [id number]  Account: [id number]   : 1935  (89 y.o.) Gender: female     Past Medical History:  has a past medical history of Allergic rhinitis, Anxiety, Arthritis, Asthma, Back pain, GERD (gastroesophageal reflux disease), Goiter, Hyperlipidemia, Hypertension, MRSA (methicillin resistant staph aureus) culture positive, CARRINGTON (obstructive sleep apnea), Rash, and Thyroid disease.  Past Surgical History:   has a past surgical history that includes Diagnostic Cardiac Cath Lab Procedure; Hysterectomy; Cholecystectomy; Rotator cuff repair; knee surgery; Breast surgery; Wrist surgery; Cataract removal; lumbar fusion (2018); eye surgery; Dilation and curettage of uterus; Total knee arthroplasty (Right, 2021); Total knee arthroplasty (Left, 2021); and Total hip arthroplasty (Left, 7/15/2025).    Restrictions  Restrictions/Precautions: Weight Bearing, Fall Risk, General Precautions  Hip Precautions: Anterior hip precautions  Other Position/Activity Restrictions: JUJU hose, monitor BP  Left Lower Extremity Weight Bearing: Weight Bearing As Tolerated    Subjective  Subjective: Patient found in therapy gym upon arrival. Agreeable to OT treatment session with no verbalized complaints of pain. Vitals: /61, HR 78, SpO2 100%.  Restrictions/Precautions: Weight Bearing;Fall Risk;General Precautions    Objective  Cognition  Overall Cognitive Status: Exceptions  Arousal/Alertness: Appears intact  Following Commands: Follows multistep commands with increased time;Follows multistep commands with repetition  Attention Span: Attends with cues to redirect  Memory: Decreased short term memory  Safety Judgement: Decreased awareness of need for safety  Problem Solving: Assistance required to generate solutions;Assistance required

## 2025-07-30 NOTE — PROGRESS NOTES
Physical Therapy  Facility/Department: [facility]  Rehabilitation Physical Therapy Treatment Note    NAME: Edna Mcconnell  : 1935 (89 y.o.)  MRN: [id number]  CODE STATUS: Full Code    Date of Service: 25       Restrictions:  Restrictions/Precautions: Weight Bearing, Fall Risk, General Precautions  Lower Extremity Weight Bearing Restrictions  Left Lower Extremity Weight Bearing: Weight Bearing As Tolerated  Position Activity Restriction  Hip Precautions: Anterior hip precautions  Other Position/Activity Restrictions: JUJU hose, monitor BP     SUBJECTIVE  Subjective: Patient agreeable to PT session  Pain: 0/10 throughout session       OBJECTIVE  Cognition  Overall Cognitive Status: Exceptions  Arousal/Alertness: Appears intact  Following Commands: Follows multistep commands with increased time;Follows multistep commands with repetition  Attention Span: Attends with cues to redirect  Memory: Decreased short term memory  Safety Judgement: Decreased awareness of need for safety  Problem Solving: Assistance required to generate solutions;Assistance required to identify errors made;Assistance required to correct errors made  Insights: Decreased awareness of deficits  Initiation: Appears intact  Sequencing: Appears intact  Orientation  Overall Orientation Status: Impaired  Orientation Level: Oriented to person;Oriented to situation;Disoriented to time;Oriented to place    Functional Mobility  Roll Left  Assistance Level: Independent  Roll Right  Assistance Level: Independent  Sit to Supine  Assistance Level: Independent  Supine to Sit  Assistance Level: Independent  Scooting  Assistance Level: Independent  Skilled Clinical Factors: Edge of mat  Balance  Sitting Balance: Independent  Standing Balance: Supervision  Standing Balance  Time: 1-3 minutes at a time  Activity: standing in bathroom for toileting, dressing tasks, hand hygiene at sink  Transfers  Surface: Standard toilet;From chair with

## 2025-07-30 NOTE — PLAN OF CARE
Problem: Discharge Planning  Goal: Discharge to home or other facility with appropriate resources  Outcome: Progressing     Problem: Pain  Goal: Verbalizes/displays adequate comfort level or baseline comfort level  Outcome: Progressing     Problem: Safety - Adult  Goal: Free from fall injury  7/30/2025 0935 by Marly Winston RN  Outcome: Progressing     Problem: ABCDS Injury Assessment  Goal: Absence of physical injury  Outcome: Progressing

## 2025-07-30 NOTE — PROGRESS NOTES
Edna Mcconnell  7/30/2025  [id number]    Chief Complaint: Closed left hip fracture, initial encounter ([facility])    Subjective:   No acute events overnight. Today Alina is seen in her room. She reports sleeping well last night and reports that her pain is manageable. She is looking forward to discharge home Saturday.     Personally reviewed labs.     ROS: denies f/c, n/v, cp, sob    Objective:  Patient Vitals for the past 24 hrs:   BP Temp Temp src Pulse Resp SpO2 Weight   07/30/25 0745 118/61 97.9 °F (36.6 °C) Oral 78 18 100 % --   07/30/25 0537 -- -- -- -- -- -- 77.4 kg (170 lb 9.6 oz)   07/29/25 2000 118/67 97.5 °F (36.4 °C) Oral 75 18 100 % --     Gen: No distress, pleasant.   HEENT: Normocephalic, atraumatic.   CV: extremities well perfused  Resp: No respiratory distress. On room air  Abd: Soft, nondistended  Ext: No edema.   Neuro: Alert, oriented, appropriately interactive. Speech fluent   Psych: appropriate mood and affect    Wt Readings from Last 3 Encounters:   07/30/25 77.4 kg (170 lb 9.6 oz)   07/16/25 90.4 kg (199 lb 4.7 oz)   02/02/24 81.6 kg (180 lb)       Laboratory data:   Lab Results   Component Value Date    WBC 8.3 07/30/2025    HGB 10.3 (L) 07/30/2025    HCT 30.8 (L) 07/30/2025    MCV 95.0 07/30/2025     07/30/2025       Lab Results   Component Value Date/Time     07/30/2025 05:56 AM    K 3.5 07/30/2025 05:56 AM     07/30/2025 05:56 AM    CO2 25 07/30/2025 05:56 AM    BUN 14 07/30/2025 05:56 AM    CREATININE 1.1 07/30/2025 05:56 AM    GLUCOSE 107 07/30/2025 05:56 AM    CALCIUM 8.8 07/30/2025 05:56 AM        Therapy progress:  Physical therapy:  Bed Mobility:     Sit>supine:  Assistance Level: Contact guard assist  Skilled Clinical Factors: bed flat  Supine>sit:  Assistance Level: Minimal assistance  Skilled Clinical Factors: for LLE  Transfers:  Surface: Standard toilet, From chair with arms, Wheelchair (rocker lounge chair in hallway)  Additional Factors: Verbal cues, Increased  extremities  Propulsion Quality: Slow velocity, Short strokes  Propulsion Distance: 50 ft    Occupational therapy:   Feeding  Assistance Level: Independent  Skilled Clinical Factors: with beverage management.  Grooming/Oral Hygiene  Assistance Level: Supervision  Skilled Clinical Factors: to brush hair in stance at the sink.  UE Bathing  Assistance Level: Set-up  Skilled Clinical Factors: seated on shower chair.  LE Bathing  Assistance Level: Supervision  Skilled Clinical Factors: S/U seated on shower chair. SPV when washing pericare region in stance.  UE Dressing  Assistance Level: Set-up  Skilled Clinical Factors: to don/doff shirt seated on shower chair.  LE Dressing  Equipment Provided: Reachers  Assistance Level: Supervision  Skilled Clinical Factors: S/U to thread BLEs into brief and pants seated on shower chair. SPV to pull clothing from knees to hips in stance.  Putting On/Taking Off Footwear  Equipment Provided: Sock aid, Reachers  Assistance Level: Set-up  Skilled Clinical Factors: to don/doff socks seated on shower chair. improved carryover noted with use of reacher and sock aid during performance.  Toileting  Assistance Level: Supervision  Skilled Clinical Factors: Nery to void while seated. SPV to perform pericare and clothing management in stance.    Speech therapy:    ADULT DIET; Regular    Body mass index is 30.22 kg/m².    Assessment and Plan:  Edna Mcconnell is an 89 year old female with a past medical history significant for CKD stage 3, anxiety, asthma, GERD, HTN, HLD, CARRINGTON, and obesity who initially presented to [facility] ER on 7/15/25 after a fall at home with left hip pain, found to have left hip fracture subsequently transferred to [facility] then [facility], now s/p left total hip arthroplasty. She was admitted to [facility] on 7/17/25 due to functional deficits below her baseline.     Left hip fracture  - s/p left anterior total hip arthroplasty  - incision care  - DVT with lovenox while

## 2025-07-30 NOTE — CARE COORDINATION
CM update: patient lives with  in a ranch-style house w/ 2STE. Patient has strong support from daughters who are both RN's. Discharge home is planned for Saturday 8/2 w/ a recommendation of [facility]. Patient has used [facility] in the past- spoke with daughter Yudith who states she will talk with patient's other daughter to determine [facility] agency of choice and will call to notify (uncertain if they are agreeable to resume care through [facility]). Both daughters have attended therapy sessions. Daughter Yudith and  have family training scheduled for Friday 8/1 from 12:30-1:30- will plan to meet with after. [facility] referral has been made. Patient will need a RW at discharge ([facility]i from [facility]). Daughter aware that the recommended shower chair and transport chair will not be covered by insurance and plans to obtain. Will continue to follow.    Suzanne Miner RN

## 2025-07-31 PROCEDURE — 6360000002 HC RX W HCPCS: Performed by: STUDENT IN AN ORGANIZED HEALTH CARE EDUCATION/TRAINING PROGRAM

## 2025-07-31 PROCEDURE — 6370000000 HC RX 637 (ALT 250 FOR IP): Performed by: STUDENT IN AN ORGANIZED HEALTH CARE EDUCATION/TRAINING PROGRAM

## 2025-07-31 PROCEDURE — 97535 SELF CARE MNGMENT TRAINING: CPT

## 2025-07-31 PROCEDURE — 97110 THERAPEUTIC EXERCISES: CPT

## 2025-07-31 PROCEDURE — 97530 THERAPEUTIC ACTIVITIES: CPT

## 2025-07-31 PROCEDURE — 1280000000 HC REHAB R&B

## 2025-07-31 PROCEDURE — 97116 GAIT TRAINING THERAPY: CPT

## 2025-07-31 RX ADMIN — DULOXETINE 60 MG: 60 CAPSULE, DELAYED RELEASE ORAL at 07:54

## 2025-07-31 RX ADMIN — DICLOFENAC SODIUM 2 G: 10 GEL TOPICAL at 07:57

## 2025-07-31 RX ADMIN — METOPROLOL SUCCINATE 12.5 MG: 25 TABLET, EXTENDED RELEASE ORAL at 07:54

## 2025-07-31 RX ADMIN — ENOXAPARIN SODIUM 40 MG: 100 INJECTION SUBCUTANEOUS at 20:42

## 2025-07-31 RX ADMIN — Medication 3 MG: at 20:41

## 2025-07-31 RX ADMIN — PRAVASTATIN SODIUM 20 MG: 20 TABLET ORAL at 07:54

## 2025-07-31 RX ADMIN — FUROSEMIDE 20 MG: 20 TABLET ORAL at 07:55

## 2025-07-31 RX ADMIN — DICLOFENAC SODIUM 2 G: 10 GEL TOPICAL at 20:42

## 2025-07-31 RX ADMIN — HYDROCODONE BITARTRATE AND ACETAMINOPHEN 1 TABLET: 5; 325 TABLET ORAL at 20:41

## 2025-07-31 RX ADMIN — PANTOPRAZOLE SODIUM 40 MG: 40 TABLET, DELAYED RELEASE ORAL at 05:42

## 2025-07-31 ASSESSMENT — PAIN SCALES - GENERAL
PAINLEVEL_OUTOF10: 0
PAINLEVEL_OUTOF10: 0
PAINLEVEL_OUTOF10: 7

## 2025-07-31 ASSESSMENT — PAIN DESCRIPTION - LOCATION: LOCATION: HIP

## 2025-07-31 ASSESSMENT — PAIN DESCRIPTION - DESCRIPTORS: DESCRIPTORS: DISCOMFORT

## 2025-07-31 ASSESSMENT — PAIN DESCRIPTION - ORIENTATION: ORIENTATION: LEFT

## 2025-07-31 NOTE — PROGRESS NOTES
Edna Mcconnell  7/31/2025  [id number]    Chief Complaint: Closed left hip fracture, initial encounter ([facility])    Subjective:   No acute events overnight. Today Alina is seen in her room. She reports feeling well and denies acute complaints at this time. She plans to check with her family on what pharmacy she should use.     No new labs.     ROS: denies f/c, n/v, cp, sob    Objective:  Patient Vitals for the past 24 hrs:   BP Temp Temp src Pulse Resp SpO2 Weight   07/31/25 0727 121/60 97.7 °F (36.5 °C) Oral 76 17 96 % --   07/31/25 0551 -- -- -- -- -- -- 76.3 kg (168 lb 4.8 oz)   07/30/25 2015 (!) 120/58 98.6 °F (37 °C) Oral 78 18 99 % --     Gen: No distress, pleasant.   HEENT: Normocephalic, atraumatic.   CV: systolic murmur  Resp: No respiratory distress. Lungs CTAB  Abd: Soft, nondistended  Ext: No edema.   Neuro: Alert, oriented, appropriately interactive. Speech fluent   Psych: appropriate mood and affect    Wt Readings from Last 3 Encounters:   07/31/25 76.3 kg (168 lb 4.8 oz)   07/16/25 90.4 kg (199 lb 4.7 oz)   02/02/24 81.6 kg (180 lb)       Laboratory data:   Lab Results   Component Value Date    WBC 8.3 07/30/2025    HGB 10.3 (L) 07/30/2025    HCT 30.8 (L) 07/30/2025    MCV 95.0 07/30/2025     07/30/2025       Lab Results   Component Value Date/Time     07/30/2025 05:56 AM    K 3.5 07/30/2025 05:56 AM     07/30/2025 05:56 AM    CO2 25 07/30/2025 05:56 AM    BUN 14 07/30/2025 05:56 AM    CREATININE 1.1 07/30/2025 05:56 AM    GLUCOSE 107 07/30/2025 05:56 AM    CALCIUM 8.8 07/30/2025 05:56 AM        Therapy progress:  Physical therapy:  Bed Mobility:     Sit>supine:  Assistance Level: Independent  Skilled Clinical Factors: bed flat  Supine>sit:  Assistance Level: Independent  Skilled Clinical Factors: for LLE  Transfers:  Surface: From chair with arms, To chair with arms, Wheelchair  Additional Factors: Verbal cues, Increased time to complete  Device: Walker (RW)  Sit>stand:  Assistance

## 2025-07-31 NOTE — DISCHARGE INSTR - COC
Continuity of Care Form    Patient Name: Edna Mcconnell   :  1935  MRN:  [id number]    Admit date:  2025  Discharge date:  25    Code Status Order: Full Code   Advance Directives:     Admitting Physician:  Chelsea Bae MD  PCP: Cristy Neves MD    Discharging Nurse: Jillian Walker [facility] Unit/Room#: 0158/0158-01  Discharging Unit Phone Number: [phone]    Emergency Contact:   Extended Emergency Contact Information  Primary Emergency Contact: Yudith Rojas  Home Phone: [phone]  Work Phone: [phone]  Mobile Phone: [phone]  Relation: Child  Secondary Emergency Contact: Ghanshyam Rojas  Mobile Phone: [phone]  Relation: Grandchild   needed? No    Past Surgical History:  Past Surgical History:   Procedure Laterality Date    BREAST SURGERY      CATARACT REMOVAL      CHOLECYSTECTOMY      DIAGNOSTIC CARDIAC CATH LAB PROCEDURE      DILATION AND CURETTAGE OF UTERUS      EYE SURGERY      HYSTERECTOMY (CERVIX STATUS UNKNOWN)      KNEE SURGERY      LUMBAR FUSION  2018    ROTATOR CUFF REPAIR      TOTAL HIP ARTHROPLASTY Left 7/15/2025    LEFT HIP TOTAL ARTHROPLASTY performed by Lyle Castle MD at [facility] OR    TOTAL KNEE ARTHROPLASTY Right 2021    RIGHT TOTAL KNEE REPLACEMENT performed by Ken Pedroza MD at [facility] [place]    TOTAL KNEE ARTHROPLASTY Left 2021    LEFT TOTAL KNEE REPLACEMENT performed by Ken Pedroza MD at [facility] [place]    WRIST SURGERY         Immunization History:   Immunization History   Administered Date(s) Administered    COVID-19, MODERNA, , (age 12y+), IM, 50mcg/0.5mL 2023    Influenza Virus Vaccine 10/01/2013       Active Problems:  Patient Active Problem List   Diagnosis Code    CARRINGTON (obstructive sleep apnea) G47.33    HTN (hypertension) I10    Obesity E66.9    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

## 2025-07-31 NOTE — PLAN OF CARE
Problem: Pain  Goal: Verbalizes/displays adequate comfort level or baseline comfort level  7/30/2025 2252 by Anastasia Fonseca, RN  Outcome: Progressing  Flowsheets (Taken 7/30/2025 2252)  Verbalizes/displays adequate comfort level or baseline comfort level:   Encourage patient to monitor pain and request assistance   Implement non-pharmacological measures as appropriate and evaluate response   Assess pain using appropriate pain scale   Administer analgesics based on type and severity of pain and evaluate response   Consider cultural and social influences on pain and pain management   Notify Licensed Independent Practitioner if interventions unsuccessful or patient reports new pain

## 2025-07-31 NOTE — PROGRESS NOTES
Occupational Therapy  Facility/Department: [facility]  Rehabilitation Occupational Therapy Daily Treatment Note    Date: 25  Patient Name: Edna Mcconnell       Room: 0158/0158-01  MRN: [id number]  Account: [id number]   : 1935  (89 y.o.) Gender: female     Past Medical History:  has a past medical history of Allergic rhinitis, Anxiety, Arthritis, Asthma, Back pain, GERD (gastroesophageal reflux disease), Goiter, Hyperlipidemia, Hypertension, MRSA (methicillin resistant staph aureus) culture positive, CARRINGTON (obstructive sleep apnea), Rash, and Thyroid disease.  Past Surgical History:   has a past surgical history that includes Diagnostic Cardiac Cath Lab Procedure; Hysterectomy; Cholecystectomy; Rotator cuff repair; knee surgery; Breast surgery; Wrist surgery; Cataract removal; lumbar fusion (2018); eye surgery; Dilation and curettage of uterus; Total knee arthroplasty (Right, 2021); Total knee arthroplasty (Left, 2021); and Total hip arthroplasty (Left, 7/15/2025).    Restrictions  Restrictions/Precautions: Weight Bearing, Fall Risk, General Precautions  Hip Precautions: Anterior hip precautions  Other Position/Activity Restrictions: JUJU hose, monitor BP  Left Lower Extremity Weight Bearing: Weight Bearing As Tolerated    Subjective  Subjective: Patient supine in bed upon arrival. Agreeable to OT treatment session with no verbalized complaints of pain. Vitals: /60, HR 76, SpO2 96%.  Restrictions/Precautions: Weight Bearing;Fall Risk;General Precautions    Objective  Cognition  Overall Cognitive Status: Exceptions  Arousal/Alertness: Appears intact  Following Commands: Follows multistep commands with increased time;Follows multistep commands with repetition  Attention Span: Attends with cues to redirect  Memory: Decreased short term memory  Safety Judgement: Decreased awareness of need for safety  Problem Solving: Assistance required to generate solutions;Assistance required to

## 2025-07-31 NOTE — PROGRESS NOTES
Physical Therapy  Facility/Department: [facility]  Rehabilitation Physical Therapy Treatment Note    NAME: Edna Mcconnell  : 1935 (89 y.o.)  MRN: [id number]  CODE STATUS: Full Code    Date of Service: 25       Restrictions:  Restrictions/Precautions: Weight Bearing, Fall Risk, General Precautions  Lower Extremity Weight Bearing Restrictions  Left Lower Extremity Weight Bearing: Weight Bearing As Tolerated  Position Activity Restriction  Hip Precautions: Anterior hip precautions  Other Position/Activity Restrictions: JUJU hose, monitor BP     SUBJECTIVE  Subjective: Patient agreeable to PT session  Pain: 0/10 throughout session       OBJECTIVE  Cognition  Overall Cognitive Status: Exceptions  Arousal/Alertness: Appears intact  Following Commands: Follows multistep commands with increased time;Follows multistep commands with repetition  Attention Span: Attends with cues to redirect  Memory: Decreased short term memory  Safety Judgement: Decreased awareness of need for safety  Problem Solving: Assistance required to generate solutions;Assistance required to identify errors made;Assistance required to correct errors made  Insights: Decreased awareness of deficits  Initiation: Appears intact  Sequencing: Appears intact  Orientation  Overall Orientation Status: Impaired  Orientation Level: Oriented to person;Oriented to situation;Disoriented to time;Oriented to place    Functional Mobility  Transfers  Surface: From chair with arms;To chair with arms;Wheelchair  Additional Factors: Verbal cues;Increased time to complete  Device: Walker (RW)  Sit to Stand  Assistance Level: Independent  Stand to Sit  Assistance Level: Independent  Bed To/From Chair  Technique: Stand pivot  Assistance Level: Independent  Skilled Clinical Factors: with safety concerns d/t posture, no LOB  Stand Pivot  Assistance Level: Independent  Skilled Clinical Factors: with safety concerns d/t posture, no LOB      PT Exercises  Exercise

## 2025-07-31 NOTE — CARE COORDINATION
CM update: Received voicemail from daughter Yudith informing of [facility] agency of choice. Daughter requests [facility]. Called and made referral which [facility] has accepted for discharge planned Saturday 8/2. Family training is scheduled with  and daughter tomorrow from 12:30-1:30- will plan to meet with after. Dontae is aware of need for RW. Family to obtain the shower chair w/ back and transport chair not covered by insurance. /Dtr will provide transportation home.     Suzanne Miner RN

## 2025-08-01 LAB
ANION GAP SERPL CALCULATED.3IONS-SCNC: 12 MMOL/L (ref 3–16)
BASOPHILS # BLD: 0.1 K/UL (ref 0–0.2)
BASOPHILS NFR BLD: 2.2 %
BUN SERPL-MCNC: 15 MG/DL (ref 7–20)
CALCIUM SERPL-MCNC: 9.1 MG/DL (ref 8.3–10.6)
CHLORIDE SERPL-SCNC: 102 MMOL/L (ref 99–110)
CO2 SERPL-SCNC: 27 MMOL/L (ref 21–32)
CREAT SERPL-MCNC: 1.1 MG/DL (ref 0.6–1.2)
DEPRECATED RDW RBC AUTO: 14.2 % (ref 12.4–15.4)
EKG ATRIAL RATE: 120 BPM
EKG DIAGNOSIS: NORMAL
EKG P AXIS: 65 DEGREES
EKG P-R INTERVAL: 144 MS
EKG Q-T INTERVAL: 352 MS
EKG QRS DURATION: 88 MS
EKG QTC CALCULATION (BAZETT): 461 MS
EKG R AXIS: 19 DEGREES
EKG T AXIS: 66 DEGREES
EKG VENTRICULAR RATE: 103 BPM
EOSINOPHIL # BLD: 0.7 K/UL (ref 0–0.6)
EOSINOPHIL NFR BLD: 10.6 %
GFR SERPLBLD CREATININE-BSD FMLA CKD-EPI: 48 ML/MIN/{1.73_M2}
GLUCOSE SERPL-MCNC: 107 MG/DL (ref 70–99)
HCT VFR BLD AUTO: 31.3 % (ref 36–48)
HGB BLD-MCNC: 10.5 G/DL (ref 12–16)
LYMPHOCYTES # BLD: 1.4 K/UL (ref 1–5.1)
LYMPHOCYTES NFR BLD: 21.4 %
MCH RBC QN AUTO: 31.5 PG (ref 26–34)
MCHC RBC AUTO-ENTMCNC: 33.5 G/DL (ref 31–36)
MCV RBC AUTO: 94.2 FL (ref 80–100)
MONOCYTES # BLD: 0.5 K/UL (ref 0–1.3)
MONOCYTES NFR BLD: 7.8 %
NEUTROPHILS # BLD: 3.9 K/UL (ref 1.7–7.7)
NEUTROPHILS NFR BLD: 58 %
PLATELET # BLD AUTO: 386 K/UL (ref 135–450)
PMV BLD AUTO: 7.9 FL (ref 5–10.5)
POTASSIUM SERPL-SCNC: 3.9 MMOL/L (ref 3.5–5.1)
RBC # BLD AUTO: 3.32 M/UL (ref 4–5.2)
SODIUM SERPL-SCNC: 141 MMOL/L (ref 136–145)
WBC # BLD AUTO: 6.8 K/UL (ref 4–11)

## 2025-08-01 PROCEDURE — 80048 BASIC METABOLIC PNL TOTAL CA: CPT

## 2025-08-01 PROCEDURE — 6370000000 HC RX 637 (ALT 250 FOR IP): Performed by: STUDENT IN AN ORGANIZED HEALTH CARE EDUCATION/TRAINING PROGRAM

## 2025-08-01 PROCEDURE — 97116 GAIT TRAINING THERAPY: CPT

## 2025-08-01 PROCEDURE — 6360000002 HC RX W HCPCS: Performed by: STUDENT IN AN ORGANIZED HEALTH CARE EDUCATION/TRAINING PROGRAM

## 2025-08-01 PROCEDURE — 97110 THERAPEUTIC EXERCISES: CPT

## 2025-08-01 PROCEDURE — 97530 THERAPEUTIC ACTIVITIES: CPT

## 2025-08-01 PROCEDURE — 85025 COMPLETE CBC W/AUTO DIFF WBC: CPT

## 2025-08-01 PROCEDURE — 93005 ELECTROCARDIOGRAM TRACING: CPT | Performed by: STUDENT IN AN ORGANIZED HEALTH CARE EDUCATION/TRAINING PROGRAM

## 2025-08-01 PROCEDURE — 93010 ELECTROCARDIOGRAM REPORT: CPT | Performed by: INTERNAL MEDICINE

## 2025-08-01 PROCEDURE — 97112 NEUROMUSCULAR REEDUCATION: CPT

## 2025-08-01 PROCEDURE — 1280000000 HC REHAB R&B

## 2025-08-01 PROCEDURE — 97535 SELF CARE MNGMENT TRAINING: CPT

## 2025-08-01 RX ORDER — TRAMADOL HYDROCHLORIDE 50 MG/1
50 TABLET ORAL DAILY PRN
COMMUNITY

## 2025-08-01 RX ORDER — METOPROLOL SUCCINATE 25 MG/1
12.5 TABLET, EXTENDED RELEASE ORAL DAILY
Qty: 30 TABLET | Refills: 0 | Status: SHIPPED | OUTPATIENT
Start: 2025-08-01

## 2025-08-01 RX ORDER — HYDROCODONE BITARTRATE AND ACETAMINOPHEN 5; 325 MG/1; MG/1
1 TABLET ORAL EVERY 6 HOURS PRN
Qty: 28 TABLET | Refills: 0 | Status: SHIPPED | OUTPATIENT
Start: 2025-08-01 | End: 2025-08-08

## 2025-08-01 RX ADMIN — DICLOFENAC SODIUM 2 G: 10 GEL TOPICAL at 19:54

## 2025-08-01 RX ADMIN — PRAVASTATIN SODIUM 20 MG: 20 TABLET ORAL at 08:55

## 2025-08-01 RX ADMIN — HYDROCODONE BITARTRATE AND ACETAMINOPHEN 1 TABLET: 5; 325 TABLET ORAL at 19:53

## 2025-08-01 RX ADMIN — DULOXETINE 60 MG: 60 CAPSULE, DELAYED RELEASE ORAL at 08:55

## 2025-08-01 RX ADMIN — FUROSEMIDE 20 MG: 20 TABLET ORAL at 08:56

## 2025-08-01 RX ADMIN — ENOXAPARIN SODIUM 40 MG: 100 INJECTION SUBCUTANEOUS at 19:54

## 2025-08-01 RX ADMIN — PANTOPRAZOLE SODIUM 40 MG: 40 TABLET, DELAYED RELEASE ORAL at 05:57

## 2025-08-01 RX ADMIN — DICLOFENAC SODIUM 2 G: 10 GEL TOPICAL at 10:29

## 2025-08-01 RX ADMIN — Medication 3 MG: at 19:53

## 2025-08-01 ASSESSMENT — PAIN DESCRIPTION - ORIENTATION: ORIENTATION: LEFT

## 2025-08-01 ASSESSMENT — PAIN SCALES - GENERAL
PAINLEVEL_OUTOF10: 0
PAINLEVEL_OUTOF10: 7
PAINLEVEL_OUTOF10: 0

## 2025-08-01 ASSESSMENT — PAIN DESCRIPTION - DESCRIPTORS: DESCRIPTORS: DISCOMFORT

## 2025-08-01 ASSESSMENT — PAIN DESCRIPTION - LOCATION: LOCATION: HIP

## 2025-08-01 NOTE — CARE COORDINATION
CM update: Spoke with patient and family in room who state that family training went well and feel good about discharge home tomorrow. All questions answered. No further needs from case management. Called and notified [facility] of discharge home tomorrow.    Case Management Discharge Summary  [facility] - Acute Rehab Unit    Patient:Edna Mcconnell     Rehab Dx/Hx: Closed left hip fracture, initial encounter ([facility]) [S72.002A]       Today's Date: 8/1/2025    Discharge to:  Home w/  and [facility]: SN/PT/OT   Pre-certification completed:  [] No       [] Yes     [x] N/A   Hospital Exemption Notification (HENS) completed:  [] No       [] Yes     [x] N/A   IMM given:  Yes 8/1/25       New Durable Medical Equipment ordered/agency:  RW provided through [employer]   Transportation:  Agency used: private car via  and daughter  Ambulance form completed: [] No       [] Yes   [x] N/A       Confirmed discharge plan with:  Patient: [] No       [x] Yes  Family:  [] No       [x] Yes      Name:daughter Yudith Rojas  Contact number: [phone]  Facility/Agency, name: [facility]  PARUL/AVS faxed/ will pull orders from [employer]  Phone number for report to facility: N/A  RN, name: Lucita RN       Has lack of transportation kept you from medical appointments, meetings, work, or ADL's? [] Yes, it has kept me from medical appointments or from getting my meds  [] Yes, It has kept me from non-medical meetings, appointments, work, or getting things I need  [x] No  [] Pt unable to respond  [] Pt declines to respond     How often do you need to have someone help you when you read instructions, pamphlets, or other written material from your doctor or pharmacy? [] Never                             [x] Always  [] Rarely                            [] Patient declines to respond  [] Sometimes                    [] Patient unable to respond  [] Often       Note: Discharging nurse to complete

## 2025-08-01 NOTE — PROGRESS NOTES
Occupational Therapy  Facility/Department: [facility]  Rehabilitation Occupational Therapy Daily Treatment Note    Date: 25  Patient Name: Edna Mcconnell       Room: 0158/0158-01  MRN: [id number]  Account: [id number]   : 1935  (89 y.o.) Gender: female     Pt was bathed during OT session this date. Pt was Showered with soap/water with Modified [place].     Past Medical History:  has a past medical history of Allergic rhinitis, Anxiety, Arthritis, Asthma, Back pain, GERD (gastroesophageal reflux disease), Goiter, Hyperlipidemia, Hypertension, MRSA (methicillin resistant staph aureus) culture positive, CARRINGTON (obstructive sleep apnea), Rash, and Thyroid disease.  Past Surgical History:   has a past surgical history that includes Diagnostic Cardiac Cath Lab Procedure; Hysterectomy; Cholecystectomy; Rotator cuff repair; knee surgery; Breast surgery; Wrist surgery; Cataract removal; lumbar fusion (2018); eye surgery; Dilation and curettage of uterus; Total knee arthroplasty (Right, 2021); Total knee arthroplasty (Left, 2021); and Total hip arthroplasty (Left, 7/15/2025).    Restrictions  Restrictions/Precautions: Weight Bearing, Fall Risk, General Precautions  Hip Precautions: Anterior hip precautions  Other Position/Activity Restrictions: UJJU hose, monitor BP  Left Lower Extremity Weight Bearing: Weight Bearing As Tolerated    Subjective  Subjective: Patient sitting in recliner upon arrival. Agreeable to OT treatment session with no verbalized complaints of pain. Vitals: /66, HR 75, SpO2 96%.  Restrictions/Precautions: Weight Bearing;Fall Risk;General Precautions    Objective  Cognition  Overall Cognitive Status: Exceptions  Arousal/Alertness: Appears intact  Following Commands: Follows multistep commands with increased time;Follows multistep commands with repetition  Attention Span: Attends with cues to redirect  Memory: Decreased short term memory  Safety Judgement: Decreased

## 2025-08-01 NOTE — PROGRESS NOTES
Occupational Therapy  Discharge Summary     Name:Edna Mcconnell  MRN:[id number]  :1935  Treatment Diagnosis: decreased strength  Diagnosis: L SADIQ    Restrictions/Precautions  Restrictions/Precautions: Weight Bearing, Fall Risk, General Precautions  Activity Level: Up as Tolerated, Up with Assist   Lower Extremity Weight Bearing Restrictions  Left Lower Extremity Weight Bearing: Weight Bearing As Tolerated       Position Activity Restriction  Hip Precautions: Anterior hip precautions  Other Position/Activity Restrictions: JUJU hose, monitor BP     Goals:   Patient Goals   Patient goals : \"to get back home and do the things that I was doing before\"  Short Term Goals  Time Frame for Short Term Goals: 25  Short Term Goal 1: Patient will perform hygiene and grooming tasks in stance at the sink with CGA. GOAL MET   Short Term Goal 2: Patient will perform LB dressing tasks with CGA. GOAL MET   Short Term Goal 3: Patient will perform footwear management with the use of LRAD with CGA. GOAL MET   Short Term Goal 4: Patient will perform toileting tasks with CGA. GOAL MET   Short Term Goal 5: Patient will perform toilet transfers with CGA. GOAL MET   Long Term Goals  Time Frame for Long Term Goals : 25 - extend to  due to LOS  Long Term Goal 1: Patient will perform FB dressing tasks with Nery. GOAL MET   Long Term Goal 2: Patient will perform FB bathing with Nery. GOAL MET   Long Term Goal 3: Patient will perform toileting tasks with Nery. GOAL MET   Long Term Goal 4: Patient will perform toilet transfers with Nery. GOAL MET   Long Term Goal 5: Patient will perform a simple IADL task with SPV. GOAL MET     Pt. Met 5/5 short term goals and 5/5 long term goals. Patient progressed well with skilled occupational therapy services as she is modified independent with UB components of ADL performance, modified independent with LB components of ADL performance, modified  stance with a total standing time of 3 minutes during task completion, modified independent with UB dressing, modified independent with UB bathing, modified independent with LB dressing, modified independent with LB bathing, modified independent with footwear management, and modified independent with toileting tasks. Patient participated in an IADL simulation to address safety and independence during IADL performance and she tolerated being on her feet for 8 minutes and 33 seconds with supervision. Patient participated in therapeutic exercises and educated on HEP. Patient with good carryover of exercise performance with use of HEP visual aid as only occasional verbal cueing was required to perform the exercises correctly. Continue POC. Second Session:  Activity Tolerance: Patient tolerated treatment well  Discharge Recommendations: 24 hour supervision or assist;Home with Home health OT;S Level 1  OT Equipment Recommendations  Equipment Needed: Yes  Other: shower chair with back.  Safety Devices  Safety Devices in place: Yes  Type of devices: All fall risk precautions in place;Call light within reach;Chair alarm in place;Left in chair;Nurse notified  Restraints  Initially in place: No    Equipment Recommendations:  Shower chair with back    Home Exercise Program  Provided Pt with JOY Joaquinell HEP    Signs and Symptoms of Delirium (from CAM)  A. Acute Onset Mental Status Change  Is there evidence of an acute change in mental status from the patient's baseline?  [x] 0. No [] 1. Yes    B. Inattention: Did the patient have difficulty focusing attention, for example being easily distractible or having difficulty keeping track of what was being said?  [x] 0. Behavior not present    [] 1. Behavior continuously present, does not fluctuate   [] 2. Behavior present, fluctuates (comes and goes, changes in severity)    C. Disorganized thinking: Was the patient's thinking disorganized or incoherent (rambling or irrelevant

## 2025-08-01 NOTE — PROGRESS NOTES
Edna Mcconnell  8/1/2025  [id number]    Chief Complaint: Closed left hip fracture, initial encounter ([facility])    Subjective:   No acute events overnight. Today Alina is seen in her room. She reports feeling well and denies acute complaints at this time. She is looking forward to discharge home tomorrow.     Personally reviewed labs.     ROS: denies f/c, n/v, cp, sob    Objective:  Patient Vitals for the past 24 hrs:   BP Temp Temp src Pulse Resp SpO2 Weight   08/01/25 0845 92/73 97.5 °F (36.4 °C) Oral 85 18 96 % --   08/01/25 0749 (!) 123/58 97.3 °F (36.3 °C) Oral 74 18 97 % --   08/01/25 0556 -- -- -- -- -- -- 76.6 kg (168 lb 14.4 oz)   07/31/25 2040 (!) 122/55 97.8 °F (36.6 °C) Oral 67 16 97 % --     Gen: No distress, pleasant.   HEENT: Normocephalic, atraumatic.   CV: extremities well perfused  Resp: No respiratory distress. On room air  Abd: Soft, nondistended  Ext: No edema.   Neuro: Alert, oriented, appropriately interactive. Speech fluent   Psych: appropriate mood and affect    Wt Readings from Last 3 Encounters:   08/01/25 76.6 kg (168 lb 14.4 oz)   07/16/25 90.4 kg (199 lb 4.7 oz)   02/02/24 81.6 kg (180 lb)       Laboratory data:   Lab Results   Component Value Date    WBC 6.8 08/01/2025    HGB 10.5 (L) 08/01/2025    HCT 31.3 (L) 08/01/2025    MCV 94.2 08/01/2025     08/01/2025       Lab Results   Component Value Date/Time     08/01/2025 06:26 AM    K 3.9 08/01/2025 06:26 AM     08/01/2025 06:26 AM    CO2 27 08/01/2025 06:26 AM    BUN 15 08/01/2025 06:26 AM    CREATININE 1.1 08/01/2025 06:26 AM    GLUCOSE 107 08/01/2025 06:26 AM    CALCIUM 9.1 08/01/2025 06:26 AM        Therapy progress:  Physical therapy:  Bed Mobility:     Sit>supine:  Assistance Level: Independent  Skilled Clinical Factors: bed flat  Supine>sit:  Assistance Level: Independent  Skilled Clinical Factors: for LLE  Transfers:  Surface: From chair with arms, To chair with arms, Wheelchair  Additional Factors: Verbal cues,

## 2025-08-01 NOTE — PROGRESS NOTES
Physical Therapy  Facility/Department: [facility]  Rehabilitation Physical Therapy Treatment Note    NAME: Edna Mcconnell  : 1935 (89 y.o.)  MRN: [id number]  CODE STATUS: Full Code    Date of Service: 25       Restrictions:  Restrictions/Precautions: Weight Bearing, Fall Risk, General Precautions  Lower Extremity Weight Bearing Restrictions  Left Lower Extremity Weight Bearing: Weight Bearing As Tolerated  Position Activity Restriction  Hip Precautions: Anterior hip precautions  Other Position/Activity Restrictions: JUJU hose, monitor BP     SUBJECTIVE  Subjective: pt up in chair ready for therapy and motivated to participate  Pain: none reported throughout session except discomfort in back with postural corrections       OBJECTIVE  Cognition  Overall Cognitive Status: Exceptions  Arousal/Alertness: Appears intact  Following Commands: Follows multistep commands with increased time;Follows multistep commands with repetition  Attention Span: Attends with cues to redirect  Memory: Decreased short term memory  Safety Judgement: Decreased awareness of need for safety  Problem Solving: Assistance required to generate solutions;Assistance required to identify errors made;Assistance required to correct errors made  Insights: Decreased awareness of deficits  Initiation: Appears intact  Sequencing: Appears intact  Orientation  Overall Orientation Status: Impaired  Orientation Level: Oriented to person;Oriented to situation;Disoriented to time;Oriented to place    Functional Mobility  Balance  Sitting Balance: Independent  Standing Balance: Supervision  Transfers  Surface: From chair with arms;To chair with arms;Wheelchair  Additional Factors: Increased time to complete;With handrails  Device: Walker (RW)  Sit to Stand  Assistance Level: Independent  Skilled Clinical Factors: no verbal cues required to stand from recliner and from w/c to RW  Stand to Sit  Assistance Level: Independent  Skilled Clinical Factors: no

## 2025-08-02 VITALS
BODY MASS INDEX: 29.93 KG/M2 | DIASTOLIC BLOOD PRESSURE: 71 MMHG | RESPIRATION RATE: 18 BRPM | SYSTOLIC BLOOD PRESSURE: 124 MMHG | WEIGHT: 168.9 LBS | HEART RATE: 82 BPM | HEIGHT: 63 IN | OXYGEN SATURATION: 96 % | TEMPERATURE: 97.9 F

## 2025-08-02 PROCEDURE — 6370000000 HC RX 637 (ALT 250 FOR IP): Performed by: STUDENT IN AN ORGANIZED HEALTH CARE EDUCATION/TRAINING PROGRAM

## 2025-08-02 RX ADMIN — FUROSEMIDE 20 MG: 20 TABLET ORAL at 08:09

## 2025-08-02 RX ADMIN — METOPROLOL SUCCINATE 12.5 MG: 25 TABLET, EXTENDED RELEASE ORAL at 08:09

## 2025-08-02 RX ADMIN — DULOXETINE 60 MG: 60 CAPSULE, DELAYED RELEASE ORAL at 08:09

## 2025-08-02 RX ADMIN — PANTOPRAZOLE SODIUM 40 MG: 40 TABLET, DELAYED RELEASE ORAL at 06:47

## 2025-08-02 RX ADMIN — DICLOFENAC SODIUM 2 G: 10 GEL TOPICAL at 08:09

## 2025-08-02 RX ADMIN — PRAVASTATIN SODIUM 20 MG: 20 TABLET ORAL at 08:09

## 2025-08-02 ASSESSMENT — PAIN SCALES - GENERAL: PAINLEVEL_OUTOF10: 0

## 2025-08-02 NOTE — PROGRESS NOTES
ACUTE REHAB UNIT: DISCHARGE  [facility]    Patient:Enda Mcconnell     Rehab Dx/Hx: Closed left hip fracture, initial encounter (HCC) [S72.002A]   :1935  MRN:[id number]  Date of Admit: 2025   Date of Discharge: 2025    Subjective:   Order for patient discharge.  Reviewed discharge summary (AVS) with patient and family including medications, physical instructions (safety) and diet. Pt in stable condition.     Reconciled Medication List - Patient:   Medications were reviewed by RN at time of discharge with patient and/or family:  []  Via EMR   []  Via health information exchange  [x]  Verbal (e.g. in person, telephone, video conferencing)  [x]  Paper-based (e.g. fax, copies, printouts)   []  Other Methods (e.g. texting, email, CDs)    Reconciled Medication List - Subsequent provider:   [] No, current reconciled medication list not provided to the subsequent provider.  [] Yes, current reconciled medication list provided to the subsequent provider.   [x] Via EMR    [] Via health information exchange  [] Verbal (e.g. in person, telephone, video conferencing)  [] Paper-based (e.g. fax, copies, printouts)   [] Other Methods (e.g. texting, email, CDs)    Discharge disposition:  Pt was discharged via:  [x]  Wheelchair  []  Stretcher  []  Safe ambulation and use of assistive device  []  Other:     Pt was discharged to:  [x]  Private car  []  Transportation service to next destination  []  Other:     Discharge destination:  [x]  Home  []  Skilled Nursing Facility  []  Long Term Care  []  Other:     Has lack of transportation kept you from medical appointments, meetings, work, or ADL's? [] Yes, it has kept me from medical appointments or from getting my meds  [] Yes, It has kept me from non-medical meetings, appointments, work, or getting things I need  [x] No  [] Pt unable to respond  [] Pt declines to respond     How often do you need to have someone help you when you read instructions,  watching television       Moving or speaking so slowly that other people could have noticed.  Or the opposite- being so fidgety or restless that you have been moving around a lot more than usual.       Thoughts that you would be better off dead, or of hurting yourself in some way.       Total Severity: Add scores for all frequency responses in column 2       Social isolation (from [email or web address])  How often do you feel lonely or isolated from those around you?  [x] 0. Never  [] 1. Rarely  [] 2. Sometimes  [] 3. Often  [] 4. Always  [] 7. Patient declines to respond  [] 8. Patient unable to respond.           Discharging Nurse Signature:  Jillian Hinds RN

## 2025-08-02 NOTE — PROGRESS NOTES
IRF [facility] Discharge Assessment  [facility] Acute Rehab Unit    Patient:Edna Mcconnell     Rehab Dx/Hx: Closed left hip fracture, initial encounter ([facility]) [S72.002A]   :1935  MRN:[id number]  Date of Admit: 2025     Pain Effect on Sleep:  Over the past 5 days, how much of the time has pain made it hard for you to sleep at night?  []  0. Does not apply - I have not had any pain or hurting in the past 5 days  [x]  1. Rarely or not at all  []  2. Occasionally  []  3. Frequently  []  4. Almost constantly  []  8. Unable to answer    Over the past 5 days, how often have you limited your participation in rehabilitation therapy sessions due to pain?  []  0. Does not apply - I have not received rehabilitation therapy in the past 5 days  []  1. Rarely or not at all  [x]  2. Occasionally  []  3. Frequently  []  4. Almost constantly  []  8. Unable to answer    Pain Interference with Day-to-Day Activities:  Over the past 5 days, how often have you limited your day-to-day activities (excluding rehabilitation therapy session)?  []  1. Rarely or not at all  [x]  2. Occasionally  []  3. Frequently  []  4. Almost constantly  []  8. Unable to answer      High-Risk Drug Classes: Use and Indication   Is taking: Check if the pt is taking any medications by pharmacological classification  Indication noted: If column 1 is checked, check if there is an indication noted for all meds in the drug class Is taking  (check all that apply) Indication noted (check all that apply)   Antipsychotic [] []   Anticoagulant [] []   Antibiotic [] []   Opioid [x] [x]   Antiplatelet [] []   Hypoglycemic (including insulin) [] []   None of the above [] []     Special Treatments, Procedures, and Programs   Check all of the following treatments, procedures, and programs that apply on discharge.  On discharge (check all that apply)   Cancer Treatments    A1. Chemotherapy []   A2. IV []   A3. Oral []   A10. Other []   B1. Radiation []   Respiratory

## 2025-08-04 ENCOUNTER — OFFICE VISIT (OUTPATIENT)
Dept: ORTHOPEDIC SURGERY | Age: 89
End: 2025-08-04

## 2025-08-04 VITALS — HEIGHT: 63 IN | WEIGHT: 168 LBS | BODY MASS INDEX: 29.77 KG/M2

## 2025-08-04 DIAGNOSIS — Z96.642 HISTORY OF TOTAL HIP REPLACEMENT, LEFT: Primary | ICD-10-CM

## 2025-08-04 PROCEDURE — 99024 POSTOP FOLLOW-UP VISIT: CPT | Performed by: ORTHOPAEDIC SURGERY

## 2025-08-04 NOTE — DISCHARGE SUMMARY
Physical Therapy  Physical Therapy  Discharge Summary    Name:Edna Mcconnell  MRN:[id number]  :1935  Treatment Diagnosis: decreased strength  Diagnosis: L SADIQ    Restrictions/Precautions  Restrictions/Precautions: Weight Bearing, Fall Risk, General Precautions  Activity Level: Up as Tolerated, Up with Assist   Lower Extremity Weight Bearing Restrictions  Left Lower Extremity Weight Bearing: Weight Bearing As Tolerated       Position Activity Restriction  Hip Precautions: Anterior hip precautions  Other Position/Activity Restrictions: JUJU hose, monitor BP     Goals:                  Short Term Goals  Time Frame for Short Term Goals: 8 days ()  Short Term Goal 1: Pt will perform supine > sit with min A, bed flat - MET 25  Short Term Goal 2: Pt will perform STS with LRAD and min A - MET 25  Short Term Goal 3: Pt will perform SPT with LRAD and min A - MET 25  Short Term Goal 4: Pt will amb 20 ft with LRAD and mod A - MET 25  Short Term Goal 5: Pt will perform car transfer with min A - MET 25            Long Term Goals  Time Frame for Long Term Goals : 15 days ()  Long Term Goal 1: Pt will perform bed mobility IND with bed flat -  GOAL MET  Long Term Goal 2: Pt will perform STS with LRAD mod I -  GOAL MET (with continued safety concerns)  Long Term Goal 3: Pt will perform SPT with LRAD mod I-  GOAL MET (with continued safety concerns)  Long Term Goal 4: Pt will amb 150 ft with LRAD, SUP - met   Long Term Goal 5: Pt will ascend/descend 2 steps with CGA and LRAD - met     Pt. Met 5/5 short term goals and 5/5 long term goals.     Pt. Currently ambulates 150 feet with RW and sup  Up/down 2 steps with RW with ind   Up/down curb step with RW with sup   Sit to/from stand with mod ind  Bed mobility with ind  Recommend RW in order to ambulate safely   Pt. Safe to return home with assistance from spouse and family .   Provided pt. with HEP   Electronically signed by 
hip fracture  - s/p left anterior total hip arthroplasty  - incision care  - DVT with lovenox while admitted, on discharge transition to asa 81 mg BID for 30 days  - multimodal pain control  - PT, OT     Confusion, resolved  - UA negative for UTI     Bilateral shoulder pain  - XR showing OA  - added voltaren gel - with improvement     Subjective dysphagia  - ST with no concerns, signed off     Acute blood loss anemia  - Hb remains stable  - monitor and transfuse for Hb<7     HTN  - now with hypotension   - home regimen: toprol 50 mg daily, lasix 20 mg daily  - continue lasix  - decreased metoprolol to 12.5 mg daily      GERD  - on omeprazole at home     HFpEF  Severe aortic stenosis  Mitral valve regurgitation  - continue lasix   - daily weights, I/Os     Chronic back pain  - on ultram at home     Mood disorder  - on duloxetine at home, continue      Class 2 obesity  - Body mass index is 30.77 kg/m².  - encourage lifestyle modifications     Discharge Exam:  Constitutional: Alert, WDWN, Pleasant, no distress  Head: Normocephalic, atruamatic, MMM  Eyes: Conjunctiva noninjected, no icterus, no drainage  Pulm: CTA bilat. Respirations non-labored.   CV: No murmurs noted. RRR.   Abd: Soft, nontender. NABS+  Ext: No edema, no varicosities  Neuro: Alert, fully oriented, appropriate   MSK: No joint abnormalities noted       Discharge Functional Status:    Physical therapy:  Bed Mobility:     Sit>supine:  Assistance Level: Independent  Skilled Clinical Factors: bed flat  Supine>sit:  Assistance Level: Independent  Skilled Clinical Factors: for LLE  Transfers:  Surface: From chair with arms, To chair with arms, Wheelchair  Additional Factors: Increased time to complete, With handrails  Device: Walker (RW)  Sit>stand:  Assistance Level: Independent  Skilled Clinical Factors: no verbal cues required to stand from recliner and from w/c to RW  Stand>sit:  Assistance Level: Independent  Skilled Clinical Factors: no verbal cues

## (undated) DEVICE — SUTURE STRATAFIX SPRL SZ 2 0 L14IN ABSRB UD MH L36MM 1 2 CIR SXMP2B401

## (undated) DEVICE — SPLINT KNEE L20IN FOR 32IN THGH UNIV FOAM 3 PC DSGN TRIMMED

## (undated) DEVICE — GLOVE ORTHO 8   MSG9480

## (undated) DEVICE — LIGHT HANDLE: Brand: DEVON

## (undated) DEVICE — Device

## (undated) DEVICE — SPONGE LAP W18XL18IN WHT COT 4 PLY FLD STRUNG RADPQ DISP ST

## (undated) DEVICE — SYSTEM SKIN CLSR 22CM DERMBND PRINEO

## (undated) DEVICE — TUBE SUCT L10IN MINI FLTR CRV KAMVAC

## (undated) DEVICE — TRANSFER SET 3": Brand: MEDLINE INDUSTRIES, INC.

## (undated) DEVICE — BLADE SURG SAW SAG S STL AGG 905MM LEN 185MM W 127MM THCK

## (undated) DEVICE — GLOVE ORANGE PI 8 1/2   MSG9085

## (undated) DEVICE — Z INACTIVE USE 2660664 SOLUTION IRRIG 3000ML 0.9% SOD CHL USP UROMATIC PLAS CONT

## (undated) DEVICE — GLOVE SURG SZ 8 L12IN FNGR THK79MIL GRN LTX FREE

## (undated) DEVICE — COVER,MAYO STAND,STERILE: Brand: MEDLINE

## (undated) DEVICE — 35 ML SYRINGE LUER-LOCK TIP: Brand: MONOJECT

## (undated) DEVICE — DRESSING FOAM W4XL12IN SIL RECT ADH WTRPRF FLM BK W/ BORD

## (undated) DEVICE — GAUZE,SPONGE,4"X4",16PLY,XRAY,STRL,LF: Brand: MEDLINE

## (undated) DEVICE — MAT FLR W32XL58IN

## (undated) DEVICE — GLOVE SURG SZ 85 L12IN FNGR ORTHO 126MIL CRM LTX FREE

## (undated) DEVICE — SUTURE ETHIBOND EXCEL SZ 5 L30IN NONABSORBABLE GRN L48MM V-40 MB46G

## (undated) DEVICE — SUTURE VCRL + SZ 0 L27IN ABSRB UD L36MM CT-1 1/2 CIR VCPP41D

## (undated) DEVICE — SUTURE MCRYL + SZ 4-0 L18IN ABSRB UD L19MM PS-2 3/8 CIR MCP496G

## (undated) DEVICE — GOWN,AURORA,NONREINF,RAGLAN,XXL,STERILE: Brand: MEDLINE

## (undated) DEVICE — SOLUTION IV IRRIG 500ML 0.9% SODIUM CHL 2F7123

## (undated) DEVICE — SUTURE MONOCRYL STRATAFIX SPRL SZ 3-0 L12IN ABSRB UD FS-1 L30X30CM SXMP2B410

## (undated) DEVICE — NEEDLE SPNL L3.5IN PNK HUB S STL REG WALL FIT STYL W/ QNCKE

## (undated) DEVICE — DRAPE C ARM W/ POLY STRP W42XL72IN FOR MOB XR

## (undated) DEVICE — SUTURE ABSORBABLE BRAIDED 2-0 CT-1 27 IN UD VICRYL J259H

## (undated) DEVICE — SUTURE ABSORBABLE MONOFILAMENT 1 OS-8 36 CM 40 MM VIO PDS +

## (undated) DEVICE — ADHESIVE SKIN CLOSURE WND 8.661X1.5 IN 22 CM LIQUIBAND SECUR

## (undated) DEVICE — RETRACTOR SURG WIDE FLAT LO PROF LTWT PHOTONGUIDE

## (undated) DEVICE — ELECTRODE PT RET AD L9FT HI MOIST COND ADH HYDRGEL CORDED

## (undated) DEVICE — APPLICATOR PREP 26ML 0.7% IOD POVACRYLEX 74% ISO ALC ST

## (undated) DEVICE — BIPOLAR SEALER 23-112-1 AQM 6.0: Brand: AQUAMANTYS ®

## (undated) DEVICE — 1010 S-DRAPE TOWEL DRAPE 10/BX: Brand: STERI-DRAPE™

## (undated) DEVICE — KIT POS FOAM HANA TBL

## (undated) DEVICE — 6619 2 PTNT ISO SYS INCISE AREA&LT;(&GT;&&LT;)&GT;P: Brand: STERI-DRAPE™ IOBAN™ 2

## (undated) DEVICE — HYPODERMIC SAFETY NEEDLE: Brand: MONOJECT

## (undated) DEVICE — TOTAL BASIC: Brand: MEDLINE INDUSTRIES, INC.

## (undated) DEVICE — SOLUTION IV 1000ML 0.9% SOD CHL FOR IRRIG PLAS CONT

## (undated) DEVICE — ESMARK: Brand: DEROYAL

## (undated) DEVICE — DRAPE,REIN 53X77,STERILE: Brand: MEDLINE

## (undated) DEVICE — SOLUTION WND IRRIGATION 450 ML 0.5 PVP-I 0.9 NACL

## (undated) DEVICE — COVER LT HNDL PLAS RIG 1 PER PK

## (undated) DEVICE — SYRINGE IRRIG 60ML SFT PLIABLE BLB EZ TO GRP 1 HND USE W/